# Patient Record
Sex: FEMALE | Race: WHITE | NOT HISPANIC OR LATINO
[De-identification: names, ages, dates, MRNs, and addresses within clinical notes are randomized per-mention and may not be internally consistent; named-entity substitution may affect disease eponyms.]

---

## 2017-04-14 ENCOUNTER — APPOINTMENT (OUTPATIENT)
Dept: INTERNAL MEDICINE | Facility: CLINIC | Age: 82
End: 2017-04-14

## 2017-05-12 ENCOUNTER — APPOINTMENT (OUTPATIENT)
Dept: INTERNAL MEDICINE | Facility: CLINIC | Age: 82
End: 2017-05-12

## 2017-05-12 ENCOUNTER — LABORATORY RESULT (OUTPATIENT)
Age: 82
End: 2017-05-12

## 2017-05-12 VITALS
SYSTOLIC BLOOD PRESSURE: 124 MMHG | HEART RATE: 76 BPM | DIASTOLIC BLOOD PRESSURE: 80 MMHG | BODY MASS INDEX: 20.61 KG/M2 | HEIGHT: 62 IN | WEIGHT: 112 LBS

## 2017-05-12 DIAGNOSIS — Z09 ENCOUNTER FOR FOLLOW-UP EXAMINATION AFTER COMPLETED TREATMENT FOR CONDITIONS OTHER THAN MALIGNANT NEOPLASM: ICD-10-CM

## 2017-05-12 DIAGNOSIS — Z87.898 PERSONAL HISTORY OF OTHER SPECIFIED CONDITIONS: ICD-10-CM

## 2017-05-15 ENCOUNTER — RESULT REVIEW (OUTPATIENT)
Age: 82
End: 2017-05-15

## 2017-05-15 LAB
ALBUMIN SERPL ELPH-MCNC: 4.2 G/DL
ALP BLD-CCNC: 108 U/L
ALT SERPL-CCNC: 17 U/L
ANION GAP SERPL CALC-SCNC: 16 MMOL/L
AST SERPL-CCNC: 32 U/L
BASOPHILS # BLD AUTO: 0.05 K/UL
BASOPHILS NFR BLD AUTO: 0.9 %
BILIRUB SERPL-MCNC: 0.4 MG/DL
BUN SERPL-MCNC: 13 MG/DL
CALCIUM SERPL-MCNC: 9.5 MG/DL
CHLORIDE SERPL-SCNC: 97 MMOL/L
CHOLEST SERPL-MCNC: 214 MG/DL
CHOLEST/HDLC SERPL: 1.4 RATIO
CO2 SERPL-SCNC: 24 MMOL/L
CREAT SERPL-MCNC: 0.69 MG/DL
EOSINOPHIL # BLD AUTO: 0 K/UL
EOSINOPHIL NFR BLD AUTO: 0 %
GLUCOSE SERPL-MCNC: 100 MG/DL
HCT VFR BLD CALC: 41.4 %
HDLC SERPL-MCNC: 156 MG/DL
HGB BLD-MCNC: 13.2 G/DL
LDLC SERPL CALC-MCNC: 47 MG/DL
LYMPHOCYTES # BLD AUTO: 0.87 K/UL
LYMPHOCYTES NFR BLD AUTO: 14.2 %
MAN DIFF?: NORMAL
MCHC RBC-ENTMCNC: 31.9 GM/DL
MCHC RBC-ENTMCNC: 33.8 PG
MCV RBC AUTO: 105.9 FL
MONOCYTES # BLD AUTO: 0.38 K/UL
MONOCYTES NFR BLD AUTO: 6.2 %
NEUTROPHILS # BLD AUTO: 4.64 K/UL
NEUTROPHILS NFR BLD AUTO: 76 %
PLATELET # BLD AUTO: 242 K/UL
POTASSIUM SERPL-SCNC: 4.1 MMOL/L
PROT SERPL-MCNC: 8 G/DL
RBC # BLD: 3.91 M/UL
RBC # FLD: 13 %
SODIUM SERPL-SCNC: 137 MMOL/L
TRIGL SERPL-MCNC: 54 MG/DL
WBC # FLD AUTO: 6.1 K/UL

## 2017-08-10 ENCOUNTER — LABORATORY RESULT (OUTPATIENT)
Age: 82
End: 2017-08-10

## 2017-08-10 ENCOUNTER — APPOINTMENT (OUTPATIENT)
Dept: INTERNAL MEDICINE | Facility: CLINIC | Age: 82
End: 2017-08-10
Payer: MEDICARE

## 2017-08-10 ENCOUNTER — NON-APPOINTMENT (OUTPATIENT)
Age: 82
End: 2017-08-10

## 2017-08-10 VITALS
DIASTOLIC BLOOD PRESSURE: 70 MMHG | SYSTOLIC BLOOD PRESSURE: 120 MMHG | WEIGHT: 114 LBS | BODY MASS INDEX: 20.98 KG/M2 | HEIGHT: 62 IN | RESPIRATION RATE: 14 BRPM | HEART RATE: 76 BPM

## 2017-08-10 DIAGNOSIS — Z87.81 PRESENCE OF OTHER BONE AND TENDON IMPLANTS: ICD-10-CM

## 2017-08-10 DIAGNOSIS — Z96.7 PRESENCE OF OTHER BONE AND TENDON IMPLANTS: ICD-10-CM

## 2017-08-10 DIAGNOSIS — S72.8X1A: ICD-10-CM

## 2017-08-10 PROCEDURE — 36415 COLL VENOUS BLD VENIPUNCTURE: CPT

## 2017-08-10 PROCEDURE — G0439: CPT

## 2017-08-10 PROCEDURE — 93000 ELECTROCARDIOGRAM COMPLETE: CPT

## 2017-08-10 RX ORDER — DILTIAZEM HYDROCHLORIDE 180 MG/1
180 CAPSULE, EXTENDED RELEASE ORAL
Qty: 60 | Refills: 0 | Status: DISCONTINUED | COMMUNITY
Start: 2017-05-04

## 2017-08-10 RX ORDER — AMOXICILLIN 500 MG/1
500 CAPSULE ORAL
Qty: 28 | Refills: 0 | Status: DISCONTINUED | COMMUNITY
Start: 2017-03-09

## 2017-08-11 DIAGNOSIS — D75.89 OTHER SPECIFIED DISEASES OF BLOOD AND BLOOD-FORMING ORGANS: ICD-10-CM

## 2017-08-12 LAB
ALBUMIN SERPL ELPH-MCNC: 4.1 G/DL
ALP BLD-CCNC: 88 U/L
ALT SERPL-CCNC: 16 U/L
ANION GAP SERPL CALC-SCNC: 15 MMOL/L
APPEARANCE: CLEAR
AST SERPL-CCNC: 29 U/L
BACTERIA: NEGATIVE
BASOPHILS # BLD AUTO: 0.16 K/UL
BASOPHILS NFR BLD AUTO: 2.6 %
BILIRUB SERPL-MCNC: 0.3 MG/DL
BILIRUBIN URINE: NEGATIVE
BLOOD URINE: NEGATIVE
BUN SERPL-MCNC: 17 MG/DL
CALCIUM SERPL-MCNC: 9.1 MG/DL
CHLORIDE SERPL-SCNC: 99 MMOL/L
CHOLEST SERPL-MCNC: 241 MG/DL
CHOLEST/HDLC SERPL: 1.7 RATIO
CO2 SERPL-SCNC: 25 MMOL/L
COLOR: YELLOW
CREAT SERPL-MCNC: 0.76 MG/DL
EOSINOPHIL # BLD AUTO: 0.1 K/UL
EOSINOPHIL NFR BLD AUTO: 1.7 %
GLUCOSE QUALITATIVE U: NORMAL MG/DL
GLUCOSE SERPL-MCNC: 90 MG/DL
HCT VFR BLD CALC: 40.3 %
HDLC SERPL-MCNC: 145 MG/DL
HGB BLD-MCNC: 12.7 G/DL
HYALINE CASTS: 0 /LPF
KETONES URINE: NEGATIVE
LDLC SERPL CALC-MCNC: 83 MG/DL
LEUKOCYTE ESTERASE URINE: ABNORMAL
LYMPHOCYTES # BLD AUTO: 0.97 K/UL
LYMPHOCYTES NFR BLD AUTO: 15.7 %
MAN DIFF?: NORMAL
MCHC RBC-ENTMCNC: 31.5 GM/DL
MCHC RBC-ENTMCNC: 33.4 PG
MCV RBC AUTO: 106.1 FL
MICROSCOPIC-UA: NORMAL
MONOCYTES # BLD AUTO: 0.26 K/UL
MONOCYTES NFR BLD AUTO: 4.3 %
NEUTROPHILS # BLD AUTO: 4.6 K/UL
NEUTROPHILS NFR BLD AUTO: 74.8 %
NITRITE URINE: NEGATIVE
PH URINE: 7.5
PLATELET # BLD AUTO: 271 K/UL
POTASSIUM SERPL-SCNC: 4.2 MMOL/L
PROT SERPL-MCNC: 8.1 G/DL
PROTEIN URINE: NEGATIVE MG/DL
RBC # BLD: 3.8 M/UL
RBC # FLD: 14.3 %
RED BLOOD CELLS URINE: 0 /HPF
SODIUM SERPL-SCNC: 139 MMOL/L
SPECIFIC GRAVITY URINE: 1.01
SQUAMOUS EPITHELIAL CELLS: 0 /HPF
TRIGL SERPL-MCNC: 67 MG/DL
UROBILINOGEN URINE: NORMAL MG/DL
WBC # FLD AUTO: 6.15 K/UL
WHITE BLOOD CELLS URINE: 15 /HPF

## 2017-08-14 ENCOUNTER — RESULT REVIEW (OUTPATIENT)
Age: 82
End: 2017-08-14

## 2018-02-07 ENCOUNTER — APPOINTMENT (OUTPATIENT)
Dept: INTERNAL MEDICINE | Facility: CLINIC | Age: 83
End: 2018-02-07
Payer: MEDICARE

## 2018-02-07 VITALS
OXYGEN SATURATION: 99 % | HEART RATE: 87 BPM | WEIGHT: 110 LBS | DIASTOLIC BLOOD PRESSURE: 80 MMHG | HEIGHT: 62 IN | SYSTOLIC BLOOD PRESSURE: 120 MMHG | BODY MASS INDEX: 20.24 KG/M2

## 2018-02-07 PROCEDURE — 36415 COLL VENOUS BLD VENIPUNCTURE: CPT

## 2018-02-07 PROCEDURE — 99214 OFFICE O/P EST MOD 30 MIN: CPT | Mod: 25

## 2018-02-08 ENCOUNTER — RESULT REVIEW (OUTPATIENT)
Age: 83
End: 2018-02-08

## 2018-02-08 LAB
ALBUMIN SERPL ELPH-MCNC: 4.2 G/DL
ALP BLD-CCNC: 79 U/L
ALT SERPL-CCNC: 12 U/L
ANION GAP SERPL CALC-SCNC: 12 MMOL/L
AST SERPL-CCNC: 20 U/L
BASOPHILS # BLD AUTO: 0.03 K/UL
BASOPHILS NFR BLD AUTO: 0.5 %
BILIRUB SERPL-MCNC: 0.4 MG/DL
BUN SERPL-MCNC: 13 MG/DL
CALCIUM SERPL-MCNC: 9.4 MG/DL
CHLORIDE SERPL-SCNC: 103 MMOL/L
CHOLEST SERPL-MCNC: 251 MG/DL
CHOLEST/HDLC SERPL: 1.6 RATIO
CO2 SERPL-SCNC: 26 MMOL/L
CREAT SERPL-MCNC: 0.75 MG/DL
EOSINOPHIL # BLD AUTO: 0.22 K/UL
EOSINOPHIL NFR BLD AUTO: 3.5 %
FOLATE SERPL-MCNC: >20 NG/ML
GLUCOSE SERPL-MCNC: 98 MG/DL
HCT VFR BLD CALC: 43.6 %
HDLC SERPL-MCNC: 154 MG/DL
HGB BLD-MCNC: 14.2 G/DL
IMM GRANULOCYTES NFR BLD AUTO: 0.3 %
LDLC SERPL CALC-MCNC: 85 MG/DL
LYMPHOCYTES # BLD AUTO: 0.84 K/UL
LYMPHOCYTES NFR BLD AUTO: 13.2 %
MAN DIFF?: NORMAL
MCHC RBC-ENTMCNC: 32.6 GM/DL
MCHC RBC-ENTMCNC: 33.3 PG
MCV RBC AUTO: 102.3 FL
MONOCYTES # BLD AUTO: 0.57 K/UL
MONOCYTES NFR BLD AUTO: 9 %
NEUTROPHILS # BLD AUTO: 4.67 K/UL
NEUTROPHILS NFR BLD AUTO: 73.5 %
PLATELET # BLD AUTO: 244 K/UL
POTASSIUM SERPL-SCNC: 4.2 MMOL/L
PROT SERPL-MCNC: 7.9 G/DL
RBC # BLD: 4.26 M/UL
RBC # FLD: 13.3 %
SODIUM SERPL-SCNC: 141 MMOL/L
TRIGL SERPL-MCNC: 62 MG/DL
WBC # FLD AUTO: 6.35 K/UL

## 2018-03-05 ENCOUNTER — RX RENEWAL (OUTPATIENT)
Age: 83
End: 2018-03-05

## 2018-03-19 ENCOUNTER — OUTPATIENT (OUTPATIENT)
Dept: OUTPATIENT SERVICES | Facility: HOSPITAL | Age: 83
LOS: 1 days | End: 2018-03-19

## 2018-03-19 ENCOUNTER — APPOINTMENT (OUTPATIENT)
Dept: INTERNAL MEDICINE | Facility: CLINIC | Age: 83
End: 2018-03-19
Payer: MEDICARE

## 2018-03-19 ENCOUNTER — APPOINTMENT (OUTPATIENT)
Dept: ULTRASOUND IMAGING | Facility: CLINIC | Age: 83
End: 2018-03-19
Payer: MEDICARE

## 2018-03-19 VITALS
BODY MASS INDEX: 20.43 KG/M2 | WEIGHT: 111 LBS | SYSTOLIC BLOOD PRESSURE: 123 MMHG | HEIGHT: 62 IN | DIASTOLIC BLOOD PRESSURE: 80 MMHG | RESPIRATION RATE: 14 BRPM | HEART RATE: 74 BPM

## 2018-03-19 DIAGNOSIS — Z98.89 OTHER SPECIFIED POSTPROCEDURAL STATES: Chronic | ICD-10-CM

## 2018-03-19 DIAGNOSIS — Z96.653 PRESENCE OF ARTIFICIAL KNEE JOINT, BILATERAL: Chronic | ICD-10-CM

## 2018-03-19 PROCEDURE — 99213 OFFICE O/P EST LOW 20 MIN: CPT

## 2018-03-19 PROCEDURE — 93971 EXTREMITY STUDY: CPT | Mod: 26,RT

## 2018-05-31 ENCOUNTER — APPOINTMENT (OUTPATIENT)
Dept: INTERNAL MEDICINE | Facility: CLINIC | Age: 83
End: 2018-05-31
Payer: MEDICARE

## 2018-05-31 VITALS
SYSTOLIC BLOOD PRESSURE: 120 MMHG | OXYGEN SATURATION: 97 % | HEIGHT: 62 IN | TEMPERATURE: 98.6 F | HEART RATE: 66 BPM | WEIGHT: 112 LBS | DIASTOLIC BLOOD PRESSURE: 75 MMHG | BODY MASS INDEX: 20.61 KG/M2

## 2018-05-31 DIAGNOSIS — R20.2 PARESTHESIA OF SKIN: ICD-10-CM

## 2018-05-31 DIAGNOSIS — Z72.820 SLEEP DEPRIVATION: ICD-10-CM

## 2018-05-31 PROCEDURE — 99214 OFFICE O/P EST MOD 30 MIN: CPT | Mod: 25

## 2018-05-31 PROCEDURE — 36415 COLL VENOUS BLD VENIPUNCTURE: CPT

## 2018-05-31 RX ORDER — CEPHALEXIN 500 MG/1
500 CAPSULE ORAL 3 TIMES DAILY
Qty: 21 | Refills: 0 | Status: DISCONTINUED | COMMUNITY
Start: 2018-03-19 | End: 2018-05-31

## 2018-06-01 ENCOUNTER — RESULT REVIEW (OUTPATIENT)
Age: 83
End: 2018-06-01

## 2018-06-01 LAB
ALBUMIN SERPL ELPH-MCNC: 4.2 G/DL
ALP BLD-CCNC: 70 U/L
ALT SERPL-CCNC: 11 U/L
ANION GAP SERPL CALC-SCNC: 20 MMOL/L
AST SERPL-CCNC: 20 U/L
BASOPHILS # BLD AUTO: 0.02 K/UL
BASOPHILS NFR BLD AUTO: 0.3 %
BILIRUB SERPL-MCNC: 0.3 MG/DL
BUN SERPL-MCNC: 15 MG/DL
CALCIUM SERPL-MCNC: 9.5 MG/DL
CHLORIDE SERPL-SCNC: 101 MMOL/L
CO2 SERPL-SCNC: 20 MMOL/L
CREAT SERPL-MCNC: 0.74 MG/DL
EOSINOPHIL # BLD AUTO: 0.16 K/UL
EOSINOPHIL NFR BLD AUTO: 2.3 %
GLUCOSE SERPL-MCNC: 97 MG/DL
HCT VFR BLD CALC: 41 %
HGB BLD-MCNC: 12.9 G/DL
IMM GRANULOCYTES NFR BLD AUTO: 0.3 %
LYMPHOCYTES # BLD AUTO: 1.04 K/UL
LYMPHOCYTES NFR BLD AUTO: 14.7 %
MAN DIFF?: NORMAL
MCHC RBC-ENTMCNC: 31.5 GM/DL
MCHC RBC-ENTMCNC: 32.8 PG
MCV RBC AUTO: 104.3 FL
MONOCYTES # BLD AUTO: 0.38 K/UL
MONOCYTES NFR BLD AUTO: 5.4 %
NEUTROPHILS # BLD AUTO: 5.44 K/UL
NEUTROPHILS NFR BLD AUTO: 77 %
PLATELET # BLD AUTO: 264 K/UL
POTASSIUM SERPL-SCNC: 4.5 MMOL/L
PROT SERPL-MCNC: 7.6 G/DL
RBC # BLD: 3.93 M/UL
RBC # FLD: 13.3 %
SODIUM SERPL-SCNC: 141 MMOL/L
TSH SERPL-ACNC: 1.54 UIU/ML
WBC # FLD AUTO: 7.06 K/UL

## 2018-08-27 ENCOUNTER — RX RENEWAL (OUTPATIENT)
Age: 83
End: 2018-08-27

## 2018-09-17 ENCOUNTER — NON-APPOINTMENT (OUTPATIENT)
Age: 83
End: 2018-09-17

## 2018-09-17 ENCOUNTER — APPOINTMENT (OUTPATIENT)
Dept: INTERNAL MEDICINE | Facility: CLINIC | Age: 83
End: 2018-09-17
Payer: MEDICARE

## 2018-09-17 VITALS
WEIGHT: 108 LBS | DIASTOLIC BLOOD PRESSURE: 68 MMHG | HEART RATE: 86 BPM | RESPIRATION RATE: 14 BRPM | OXYGEN SATURATION: 97 % | HEIGHT: 62 IN | SYSTOLIC BLOOD PRESSURE: 120 MMHG | BODY MASS INDEX: 19.88 KG/M2

## 2018-09-17 DIAGNOSIS — I87.2 VENOUS INSUFFICIENCY (CHRONIC) (PERIPHERAL): ICD-10-CM

## 2018-09-17 DIAGNOSIS — L30.9 DERMATITIS, UNSPECIFIED: ICD-10-CM

## 2018-09-17 DIAGNOSIS — I27.20 PULMONARY HYPERTENSION, UNSPECIFIED: ICD-10-CM

## 2018-09-17 DIAGNOSIS — M19.90 UNSPECIFIED OSTEOARTHRITIS, UNSPECIFIED SITE: ICD-10-CM

## 2018-09-17 LAB — CYTOLOGY CVX/VAG DOC THIN PREP: NORMAL

## 2018-09-17 PROCEDURE — 36415 COLL VENOUS BLD VENIPUNCTURE: CPT

## 2018-09-17 PROCEDURE — G0439: CPT

## 2018-09-17 PROCEDURE — 93000 ELECTROCARDIOGRAM COMPLETE: CPT

## 2018-09-17 NOTE — HISTORY OF PRESENT ILLNESS
[FreeTextEntry1] : 85-year-old female with history of hypertension for which she takes Diltiazem, hyperlipidemia for which she has declined medications and osteoarthritis with DJD and DDD presents for her yearly physical.\par \par Also SVT which is controlled with diltiazem.\par \par Patient also with pulmonary hypertension without clinical signs.\par \par History also includes a history of pulmonary embolism in 2009 without etiology and hypercoagulable workup negative there has been no recurrence and resultant mild pulmonary hypertension.\par \par Also patient has a remote history of TB for which she was treated.\par \par Patient is generally feeling well with one significant issue which is persistent pruritus for which she has seen  dermatology on a number of different occasions and even wanted to [place] with no etiology of her pruritus has been determined and nothing she's been given has helped. Patient scratches frequently\par \par Otherwise without complaints including no chest pain, palpitations, shortness of breath or edema.\par \par She fell July 2016 with resultant right femur fracture which needed operative repair. The patient still suffers with this with pain would decrease ability for leg movement and  limp.\par \par More recently, the patient fell about 2 weeks ago, sustaining a fracture of her left wrist with no surgical intervention, but following with orthopedic with splinting.

## 2018-09-17 NOTE — PHYSICAL EXAM
[General Appearance - Alert] : alert [General Appearance - In No Acute Distress] : in no acute distress [Sclera] : the sclera and conjunctiva were normal [PERRL With Normal Accommodation] : pupils were equal in size, round, and reactive to light [Extraocular Movements] : extraocular movements were intact [Outer Ear] : the ears and nose were normal in appearance [Oropharynx] : the oropharynx was normal [Neck Appearance] : the appearance of the neck was normal [Neck Cervical Mass (___cm)] : no neck mass was observed [Jugular Venous Distention Increased] : there was no jugular-venous distention [Thyroid Diffuse Enlargement] : the thyroid was not enlarged [Thyroid Nodule] : there were no palpable thyroid nodules [Auscultation Breath Sounds / Voice Sounds] : lungs were clear to auscultation bilaterally [Heart Rate And Rhythm] : heart rate was normal and rhythm regular [Heart Sounds] : normal S1 and S2 [Heart Sounds Gallop] : no gallops [Murmurs] : no murmurs [Heart Sounds Pericardial Friction Rub] : no pericardial rub [Arterial Pulses Carotid] : carotid pulses were normal with no bruits [Abdominal Aorta] : the abdominal aorta was normal [Arterial Pulses Femoral] : femoral pulses were normal without bruits [Full Pulse] : the pedal pulses are present [Edema] : there was no peripheral edema [Breast Appearance] : normal in appearance [Breast Palpation Mass] : no palpable masses [Breast Abnormal Lactation (Galactorrhea)] : no nipple discharge [Bowel Sounds] : normal bowel sounds [Abdomen Soft] : soft [Abdomen Tenderness] : non-tender [Abdomen Mass (___ Cm)] : no abdominal mass palpated [Cervical Lymph Nodes Enlarged Posterior Bilaterally] : posterior cervical [Cervical Lymph Nodes Enlarged Anterior Bilaterally] : anterior cervical [Supraclavicular Lymph Nodes Enlarged Bilaterally] : supraclavicular [Axillary Lymph Nodes Enlarged Bilaterally] : axillary [Femoral Lymph Nodes Enlarged Bilaterally] : femoral [Inguinal Lymph Nodes Enlarged Bilaterally] : inguinal [No Spinal Tenderness] : no spinal tenderness [Abnormal Walk] : normal gait [Nail Clubbing] : no clubbing  or cyanosis of the fingernails [Musculoskeletal - Swelling] : no joint swelling seen [Motor Tone] : muscle strength and tone were normal [Skin Color & Pigmentation] : normal skin color and pigmentation [Skin Turgor] : normal skin turgor [] : no rash [Cranial Nerves] : cranial nerves 2-12 were intact [No Focal Deficits] : no focal deficits [Oriented To Time, Place, And Person] : oriented to person, place, and time [Impaired Insight] : insight and judgment were intact [Affect] : the affect was normal [FreeTextEntry1] : Many excoriations

## 2018-09-17 NOTE — ASSESSMENT
[FreeTextEntry1] : 85-year-old female currently medically stable without any active medical issues but continued chronic pruritus with no etiology and significant morbidity secondary to chronic need to scratch.\par \par Hypertension and SVT controlled on present medications\par \par Patient with no clinical signs of pulmonary hypertension\par \par Patient is to followup with dermatology with hopes of some treatment\par \par Patient is to continue present medications.\par \par Colonoscopy in 2007 and patient declined followups despite family history of colon cancer.\par  Offered [place] guard stool test which she also declines.\par \par Mammography 2009 and declines followup\par Bone density-has never had and declines\par \par Has declined and continues to decline all vaccines.\par \par Cardiology followup recommended and referral given\par \par Followup in 6 months\par

## 2018-09-17 NOTE — HEALTH RISK ASSESSMENT
[Good] : ~his/her~ current health as good [Very Good] : ~his/her~  mood as very good [Any fall with injury in past year] : Patient reported fall with injury in the past year [0] : 2) Feeling down, depressed, or hopeless: Not at all (0) [None] : None [With Significant Other] : lives with significant other [# of Members in Household ___] :  household currently consist of [unfilled] member(s) [High School] : high school [] :  [# Of Children ___] : has [unfilled] children [Sexually Active] : sexually active [Feels Safe at Home] : Feels safe at home [Fully functional (bathing, dressing, toileting, transferring, walking, feeding)] : Fully functional (bathing, dressing, toileting, transferring, walking, feeding) [Fully functional (using the telephone, shopping, preparing meals, housekeeping, doing laundry, using] : Fully functional and needs no help or supervision to perform IADLs (using the telephone, shopping, preparing meals, housekeeping, doing laundry, using transportation, managing medications and managing finances) [Smoke Detector] : smoke detector [Carbon Monoxide Detector] : carbon monoxide detector [Seat Belt] :  uses seat belt [Sunscreen] : uses sunscreen [Discussed at today's visit] : Advance Directives Discussed at today's visit [Designated Healthcare Proxy] : Designated healthcare proxy [Name: ___] : Health Care Proxy's Name: [unfilled]  [] : No [de-identified] : occ [[id number]] : 0 [Change in mental status noted] : No change in mental status noted [Reports changes in hearing] : Reports no changes in hearing [Reports changes in vision] : Reports no changes in vision [Reports changes in dental health] : Reports no changes in dental health [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

## 2018-09-18 ENCOUNTER — RESULT REVIEW (OUTPATIENT)
Age: 83
End: 2018-09-18

## 2018-09-18 LAB
ALBUMIN SERPL ELPH-MCNC: 4.3 G/DL
ALP BLD-CCNC: 67 U/L
ALT SERPL-CCNC: 10 U/L
ANION GAP SERPL CALC-SCNC: 16 MMOL/L
AST SERPL-CCNC: 20 U/L
BASOPHILS # BLD AUTO: 0.01 K/UL
BASOPHILS NFR BLD AUTO: 0.2 %
BILIRUB SERPL-MCNC: 0.4 MG/DL
BUN SERPL-MCNC: 18 MG/DL
CALCIUM SERPL-MCNC: 10 MG/DL
CHLORIDE SERPL-SCNC: 100 MMOL/L
CHOLEST SERPL-MCNC: 225 MG/DL
CHOLEST/HDLC SERPL: 1.8 RATIO
CO2 SERPL-SCNC: 24 MMOL/L
CREAT SERPL-MCNC: 0.71 MG/DL
EOSINOPHIL # BLD AUTO: 0.12 K/UL
EOSINOPHIL NFR BLD AUTO: 1.8 %
FOLATE SERPL-MCNC: >20 NG/ML
GLUCOSE SERPL-MCNC: 96 MG/DL
HCT VFR BLD CALC: 41.3 %
HDLC SERPL-MCNC: 127 MG/DL
HGB BLD-MCNC: 13.2 G/DL
IMM GRANULOCYTES NFR BLD AUTO: 0 %
LDLC SERPL CALC-MCNC: 86 MG/DL
LYMPHOCYTES # BLD AUTO: 1 K/UL
LYMPHOCYTES NFR BLD AUTO: 15.1 %
MAN DIFF?: NORMAL
MCHC RBC-ENTMCNC: 32 GM/DL
MCHC RBC-ENTMCNC: 33.2 PG
MCV RBC AUTO: 104 FL
MONOCYTES # BLD AUTO: 0.35 K/UL
MONOCYTES NFR BLD AUTO: 5.3 %
NEUTROPHILS # BLD AUTO: 5.14 K/UL
NEUTROPHILS NFR BLD AUTO: 77.6 %
PLATELET # BLD AUTO: 265 K/UL
POTASSIUM SERPL-SCNC: 5.2 MMOL/L
PROT SERPL-MCNC: 7.7 G/DL
RBC # BLD: 3.97 M/UL
RBC # FLD: 13.7 %
SODIUM SERPL-SCNC: 140 MMOL/L
TRIGL SERPL-MCNC: 60 MG/DL
WBC # FLD AUTO: 6.62 K/UL

## 2019-02-11 ENCOUNTER — APPOINTMENT (OUTPATIENT)
Dept: DERMATOLOGY | Facility: CLINIC | Age: 84
End: 2019-02-11
Payer: MEDICARE

## 2019-02-11 PROCEDURE — 99213 OFFICE O/P EST LOW 20 MIN: CPT | Mod: 25

## 2019-02-11 PROCEDURE — 96910 PHOTCHMTX TAR&UVB/PTRLTM&UVB: CPT

## 2019-02-15 ENCOUNTER — APPOINTMENT (OUTPATIENT)
Dept: DERMATOLOGY | Facility: CLINIC | Age: 84
End: 2019-02-15
Payer: MEDICARE

## 2019-02-15 PROCEDURE — 9999D: CPT

## 2019-02-15 PROCEDURE — 96910 PHOTCHMTX TAR&UVB/PTRLTM&UVB: CPT

## 2019-02-20 ENCOUNTER — APPOINTMENT (OUTPATIENT)
Dept: DERMATOLOGY | Facility: CLINIC | Age: 84
End: 2019-02-20
Payer: MEDICARE

## 2019-02-20 PROCEDURE — 96910 PHOTCHMTX TAR&UVB/PTRLTM&UVB: CPT

## 2019-03-12 ENCOUNTER — FORM ENCOUNTER (OUTPATIENT)
Age: 84
End: 2019-03-12

## 2019-03-13 ENCOUNTER — APPOINTMENT (OUTPATIENT)
Dept: CT IMAGING | Facility: CLINIC | Age: 84
End: 2019-03-13
Payer: MEDICARE

## 2019-03-13 ENCOUNTER — OUTPATIENT (OUTPATIENT)
Dept: OUTPATIENT SERVICES | Facility: HOSPITAL | Age: 84
LOS: 1 days | End: 2019-03-13
Payer: MEDICARE

## 2019-03-13 ENCOUNTER — RESULT CHARGE (OUTPATIENT)
Age: 84
End: 2019-03-13

## 2019-03-13 ENCOUNTER — NON-APPOINTMENT (OUTPATIENT)
Age: 84
End: 2019-03-13

## 2019-03-13 ENCOUNTER — LABORATORY RESULT (OUTPATIENT)
Age: 84
End: 2019-03-13

## 2019-03-13 ENCOUNTER — APPOINTMENT (OUTPATIENT)
Dept: INTERNAL MEDICINE | Facility: CLINIC | Age: 84
End: 2019-03-13
Payer: MEDICARE

## 2019-03-13 VITALS
DIASTOLIC BLOOD PRESSURE: 80 MMHG | HEIGHT: 62 IN | TEMPERATURE: 97.5 F | OXYGEN SATURATION: 99 % | SYSTOLIC BLOOD PRESSURE: 120 MMHG | WEIGHT: 104 LBS | BODY MASS INDEX: 19.14 KG/M2 | HEART RATE: 100 BPM | RESPIRATION RATE: 13 BRPM

## 2019-03-13 DIAGNOSIS — R10.31 RIGHT LOWER QUADRANT PAIN: ICD-10-CM

## 2019-03-13 DIAGNOSIS — Z98.89 OTHER SPECIFIED POSTPROCEDURAL STATES: Chronic | ICD-10-CM

## 2019-03-13 DIAGNOSIS — R19.7 DIARRHEA, UNSPECIFIED: ICD-10-CM

## 2019-03-13 DIAGNOSIS — Z96.653 PRESENCE OF ARTIFICIAL KNEE JOINT, BILATERAL: Chronic | ICD-10-CM

## 2019-03-13 DIAGNOSIS — R63.0 ANOREXIA: ICD-10-CM

## 2019-03-13 LAB
FLUAV SPEC QL CULT: NORMAL
FLUBV AG SPEC QL IA: NORMAL

## 2019-03-13 PROCEDURE — 71046 X-RAY EXAM CHEST 2 VIEWS: CPT

## 2019-03-13 PROCEDURE — 93000 ELECTROCARDIOGRAM COMPLETE: CPT

## 2019-03-13 PROCEDURE — 87804 INFLUENZA ASSAY W/OPTIC: CPT | Mod: QW

## 2019-03-13 PROCEDURE — 71046 X-RAY EXAM CHEST 2 VIEWS: CPT | Mod: 26

## 2019-03-13 PROCEDURE — 74177 CT ABD & PELVIS W/CONTRAST: CPT | Mod: 26

## 2019-03-13 PROCEDURE — 99214 OFFICE O/P EST MOD 30 MIN: CPT | Mod: 25

## 2019-03-13 PROCEDURE — 82565 ASSAY OF CREATININE: CPT

## 2019-03-13 PROCEDURE — 74177 CT ABD & PELVIS W/CONTRAST: CPT

## 2019-03-13 PROCEDURE — 36415 COLL VENOUS BLD VENIPUNCTURE: CPT

## 2019-03-14 LAB
ALBUMIN SERPL ELPH-MCNC: 3.8 G/DL
ALP BLD-CCNC: 64 U/L
ALT SERPL-CCNC: 9 U/L
ANION GAP SERPL CALC-SCNC: 14 MMOL/L
AST SERPL-CCNC: 16 U/L
BASOPHILS # BLD AUTO: 0.04 K/UL
BASOPHILS NFR BLD AUTO: 0.6 %
BILIRUB SERPL-MCNC: 0.3 MG/DL
BUN SERPL-MCNC: 14 MG/DL
CALCIUM SERPL-MCNC: 9.4 MG/DL
CHLORIDE SERPL-SCNC: 101 MMOL/L
CO2 SERPL-SCNC: 25 MMOL/L
CREAT SERPL-MCNC: 0.76 MG/DL
EOSINOPHIL # BLD AUTO: 0.09 K/UL
EOSINOPHIL NFR BLD AUTO: 1.2 %
GLUCOSE SERPL-MCNC: 104 MG/DL
HCT VFR BLD CALC: 42.9 %
HGB BLD-MCNC: 13 G/DL
IMM GRANULOCYTES NFR BLD AUTO: 0.3 %
LYMPHOCYTES # BLD AUTO: 0.98 K/UL
LYMPHOCYTES NFR BLD AUTO: 13.5 %
MAN DIFF?: NORMAL
MCHC RBC-ENTMCNC: 30.3 GM/DL
MCHC RBC-ENTMCNC: 32.3 PG
MCV RBC AUTO: 106.7 FL
MONOCYTES # BLD AUTO: 0.46 K/UL
MONOCYTES NFR BLD AUTO: 6.3 %
NEUTROPHILS # BLD AUTO: 5.66 K/UL
NEUTROPHILS NFR BLD AUTO: 78.1 %
PLATELET # BLD AUTO: 267 K/UL
POTASSIUM SERPL-SCNC: 4.2 MMOL/L
PROT SERPL-MCNC: 7.3 G/DL
RBC # BLD: 4.02 M/UL
RBC # FLD: 12.6 %
SODIUM SERPL-SCNC: 140 MMOL/L
TSH SERPL-ACNC: 2.74 UIU/ML
WBC # FLD AUTO: 7.25 K/UL

## 2019-03-15 ENCOUNTER — RESULT REVIEW (OUTPATIENT)
Age: 84
End: 2019-03-15

## 2019-03-15 NOTE — HISTORY OF PRESENT ILLNESS
[Spouse] : spouse [FreeTextEntry8] : Pt presents complaining of not feeling all for at least 2 weeks. Patient is complaining of loose stool/diarrhea with decreased appetite "lost 9 lbs in 2weeks" and associated fatigue/cough with mild right-sided chest pain. Patient states the right-sided chest pain it is only present when she coughs but otherwise does not feel discomfort. Patient denies fever/abdominal pain/vomiting/shortness of breath/dyspnea/urinary symptoms/travel or sick contacts. Patient has not been on antibiotics recently

## 2019-03-15 NOTE — PHYSICAL EXAM
[No Acute Distress] : no acute distress [Normal Outer Ear/Nose] : the outer ears and nose were normal in appearance [Normal Oropharynx] : the oropharynx was normal [Normal TMs] : both tympanic membranes were normal [Normal Nasal Mucosa] : the nasal mucosa was normal [Supple] : supple [No Respiratory Distress] : no respiratory distress  [Clear to Auscultation] : lungs were clear to auscultation bilaterally [Normal Rate] : normal rate  [Regular Rhythm] : with a regular rhythm [No Edema] : there was no peripheral edema [Soft] : abdomen soft [Non-distended] : non-distended [Normal Bowel Sounds] : normal bowel sounds [No CVA Tenderness] : no CVA  tenderness [No Focal Deficits] : no focal deficits [Normal Affect] : the affect was normal [Normal Mood] : the mood was normal [de-identified] : +Reproducible right-sided chest pain, no rash [de-identified] : +tender RLQ/right middle abd, no guarding/rebound

## 2019-03-15 NOTE — ASSESSMENT
[FreeTextEntry1] : Labs/urine sent out. CT of the abdomen and pelvis and chest x-ray ordered. Stool tests ordered. Patient will report any persistent/worsening and return to office as scheduled for regular followup\par \par Dr. Ortega was present in office building while I examined this pt

## 2019-03-15 NOTE — ADDENDUM
[FreeTextEntry1] : Labs are normal\par Patient was unable to do urine specimen\par \par CT of the abdomen and pelvis shows\par -Possible mild colitis involving the ascending colon\par Plan\par -Stool tests and GI evaluation\par \par Chest x-ray shows stable right apical scar, clear lungs otherwise\par

## 2019-03-20 ENCOUNTER — APPOINTMENT (OUTPATIENT)
Dept: GASTROENTEROLOGY | Facility: CLINIC | Age: 84
End: 2019-03-20
Payer: MEDICARE

## 2019-03-20 VITALS
OXYGEN SATURATION: 98 % | BODY MASS INDEX: 19.14 KG/M2 | SYSTOLIC BLOOD PRESSURE: 115 MMHG | RESPIRATION RATE: 15 BRPM | WEIGHT: 104 LBS | DIASTOLIC BLOOD PRESSURE: 80 MMHG | HEART RATE: 83 BPM | HEIGHT: 62 IN

## 2019-03-20 DIAGNOSIS — R93.5 ABNORMAL FINDINGS ON DIAGNOSTIC IMAGING OF OTHER ABDOMINAL REGIONS, INCLUDING RETROPERITONEUM: ICD-10-CM

## 2019-03-20 DIAGNOSIS — R19.7 DIARRHEA, UNSPECIFIED: ICD-10-CM

## 2019-03-20 PROCEDURE — 99203 OFFICE O/P NEW LOW 30 MIN: CPT

## 2019-03-20 NOTE — HISTORY OF PRESENT ILLNESS
[de-identified] : This is a 86-year-old female with history of pulmonary hypertension, HTN, SVT who presents with  intermittent diarrhea and constipation. She has a long history of irritable bowel syndrome with diarrhea and constipation.  Usually she has several days of constipation that are followed by several days of diarrhea. Three weeks ago she was sick with the flu or some sort of viral illness. She then had 2 weeks of severe diarrhea and saw Dr. Saenz and was sent for stool studies and a CT a/p.  The CT a/p showed mild colitis in the ascending colon.She was never able to do the stool studies because the diarrhea stopped. She no longer has diarrhea and has been constipated for 3 days and feels distended. She is starting to gain her weight back; she lost 7 lbs while she was ill with the flu 3 weeks ago. She had a colonoscopy more than 10 years ago which she says was normal.  She is here with her .

## 2019-03-20 NOTE — CONSULT LETTER
[Dear  ___] : Dear  [unfilled], [Consult Letter:] : I had the pleasure of evaluating your patient, [unfilled]. [Please see my note below.] : Please see my note below. [Sincerely,] : Sincerely, [FreeTextEntry3] : Bakari Lawson MD

## 2019-03-20 NOTE — ASSESSMENT
[FreeTextEntry1] : 85 y/o woman with a history of Irritable bowel syndrome intermittent diarrhea and constipation. Here CT a/p showed mild colitis possibly secondary to a viral gastroenteritis since her diarrhea has resolved. She currently has constipation so I  will have her start Miralax today. We discussed a colonoscopy but she would prefer to hold off at this time and see how she does on the miralax. She will call me in a week to discuss how she is doing.

## 2019-03-20 NOTE — PHYSICAL EXAM
[General Appearance - Alert] : alert [General Appearance - In No Acute Distress] : in no acute distress [Sclera] : the sclera and conjunctiva were normal [PERRL With Normal Accommodation] : pupils were equal in size, round, and reactive to light [Extraocular Movements] : extraocular movements were intact [Outer Ear] : the ears and nose were normal in appearance [Oropharynx] : the oropharynx was normal [Neck Appearance] : the appearance of the neck was normal [Neck Cervical Mass (___cm)] : no neck mass was observed [Jugular Venous Distention Increased] : there was no jugular-venous distention [Thyroid Diffuse Enlargement] : the thyroid was not enlarged [Thyroid Nodule] : there were no palpable thyroid nodules [Auscultation Breath Sounds / Voice Sounds] : lungs were clear to auscultation bilaterally [Heart Rate And Rhythm] : heart rate was normal and rhythm regular [Heart Sounds] : normal S1 and S2 [Heart Sounds Gallop] : no gallops [Murmurs] : no murmurs [Heart Sounds Pericardial Friction Rub] : no pericardial rub [Bowel Sounds] : normal bowel sounds [Abdomen Soft] : soft [] : no hepato-splenomegaly [Abdomen Tenderness] : non-tender [Abdomen Mass (___ Cm)] : no abdominal mass palpated [No CVA Tenderness] : no ~M costovertebral angle tenderness [FreeTextEntry1] : edema and stasis changes b/l LE; ecxema [Oriented To Time, Place, And Person] : oriented to person, place, and time [Impaired Insight] : insight and judgment were intact [Affect] : the affect was normal [Mood] : the mood was normal

## 2019-03-25 ENCOUNTER — EMERGENCY (EMERGENCY)
Facility: HOSPITAL | Age: 84
LOS: 1 days | Discharge: HOME | End: 2019-03-25
Payer: MEDICARE

## 2019-03-25 DIAGNOSIS — Z98.89 OTHER SPECIFIED POSTPROCEDURAL STATES: Chronic | ICD-10-CM

## 2019-03-25 DIAGNOSIS — Z96.653 PRESENCE OF ARTIFICIAL KNEE JOINT, BILATERAL: Chronic | ICD-10-CM

## 2019-03-26 ENCOUNTER — TRANSCRIPTION ENCOUNTER (OUTPATIENT)
Age: 84
End: 2019-03-26

## 2019-03-26 PROCEDURE — 36415 COLL VENOUS BLD VENIPUNCTURE: CPT

## 2019-03-26 PROCEDURE — 86140 C-REACTIVE PROTEIN: CPT

## 2019-03-26 PROCEDURE — 99285 EMERGENCY DEPT VISIT HI MDM: CPT

## 2019-03-26 PROCEDURE — 71046 X-RAY EXAM CHEST 2 VIEWS: CPT

## 2019-03-26 PROCEDURE — 85027 COMPLETE CBC AUTOMATED: CPT

## 2019-03-26 PROCEDURE — 85610 PROTHROMBIN TIME: CPT

## 2019-03-26 PROCEDURE — 85730 THROMBOPLASTIN TIME PARTIAL: CPT

## 2019-03-26 PROCEDURE — 83605 ASSAY OF LACTIC ACID: CPT

## 2019-03-26 PROCEDURE — 80053 COMPREHEN METABOLIC PANEL: CPT

## 2019-03-26 PROCEDURE — 93971 EXTREMITY STUDY: CPT

## 2019-03-26 PROCEDURE — 87040 BLOOD CULTURE FOR BACTERIA: CPT

## 2019-03-26 PROCEDURE — 93005 ELECTROCARDIOGRAM TRACING: CPT

## 2019-03-27 PROBLEM — I48.91 UNSPECIFIED ATRIAL FIBRILLATION: Chronic | Status: ACTIVE | Noted: 2019-03-25

## 2019-04-09 ENCOUNTER — APPOINTMENT (OUTPATIENT)
Dept: INTERNAL MEDICINE | Facility: CLINIC | Age: 84
End: 2019-04-09
Payer: MEDICARE

## 2019-04-09 VITALS
HEART RATE: 84 BPM | RESPIRATION RATE: 16 BRPM | OXYGEN SATURATION: 96 % | WEIGHT: 110 LBS | SYSTOLIC BLOOD PRESSURE: 125 MMHG | DIASTOLIC BLOOD PRESSURE: 80 MMHG | BODY MASS INDEX: 20.24 KG/M2 | HEIGHT: 62 IN

## 2019-04-09 DIAGNOSIS — R63.0 ANOREXIA: ICD-10-CM

## 2019-04-09 DIAGNOSIS — L03.115 CELLULITIS OF RIGHT LOWER LIMB: ICD-10-CM

## 2019-04-09 DIAGNOSIS — Z79.01 LONG TERM (CURRENT) USE OF ANTICOAGULANTS: ICD-10-CM

## 2019-04-09 PROCEDURE — 36415 COLL VENOUS BLD VENIPUNCTURE: CPT

## 2019-04-09 PROCEDURE — 99495 TRANSJ CARE MGMT MOD F2F 14D: CPT | Mod: 25

## 2019-04-09 NOTE — HISTORY OF PRESENT ILLNESS
[FreeTextEntry2] : The patient presents after being admitted to [facility] where she was sent to the ER on March 25, 2019 by cardiology. The patient was seen that day by cardiology for routine evaluation and found to have edema of her left leg for a one-week period\par Outpatient duplex was done showing a DVT of the left leg therefore she was sent to the ER.\par \par Patient was admitted and repeat duplex was done showing left-sided below the knee acute nonocclusive DVT without extension into the popliteal vein.\par \par Patient was felt stable to be discharged the next day on Elaquis 2.5 mg twice a day.\par \par Since discharge the patient has felt relatively well with continued swelling of her left leg without pain.\par Direct questioning shows no provoking event such as prolonged car ride or airplane trip, trauma or immobility.\par \par The patient did have one day of rectal bleeding which she thinks started after a difficult bowel movement and bleeding has stopped without recurrence.

## 2019-04-09 NOTE — PHYSICAL EXAM
[No Acute Distress] : no acute distress [No Respiratory Distress] : no respiratory distress  [Clear to Auscultation] : lungs were clear to auscultation bilaterally [Regular Rhythm] : with a regular rhythm [No Focal Deficits] : no focal deficits [Normal Affect] : the affect was normal [de-identified] : +2 pitting edema left leg below the knee to the foot without calf tenderness or erythema

## 2019-04-09 NOTE — ASSESSMENT
[FreeTextEntry1] : Acute DVT left leg below the knee without extension into the popliteal vein which is unprovoked therefore unclear why this has occurred.\par \par Patient will continue Eliquis 2.5 mg twice a day for at least 3 months.\par \par Hematology consultation ordered to assess patient for possible etiology.\par \par CBC will be checked.\par \par Patient told to report any further rectal bleeding.\par \par Followup in 3 weeks to reassess and possible followup duplex.

## 2019-04-10 ENCOUNTER — RESULT REVIEW (OUTPATIENT)
Age: 84
End: 2019-04-10

## 2019-04-10 LAB
BASOPHILS # BLD AUTO: 0.03 K/UL
BASOPHILS NFR BLD AUTO: 0.5 %
EOSINOPHIL # BLD AUTO: 0.1 K/UL
EOSINOPHIL NFR BLD AUTO: 1.7 %
HCT VFR BLD CALC: 36.3 %
HGB BLD-MCNC: 11.3 G/DL
IMM GRANULOCYTES NFR BLD AUTO: 0.2 %
LYMPHOCYTES # BLD AUTO: 0.97 K/UL
LYMPHOCYTES NFR BLD AUTO: 16.3 %
MAN DIFF?: NORMAL
MCHC RBC-ENTMCNC: 31.1 GM/DL
MCHC RBC-ENTMCNC: 32.7 PG
MCV RBC AUTO: 104.9 FL
MONOCYTES # BLD AUTO: 0.48 K/UL
MONOCYTES NFR BLD AUTO: 8.1 %
NEUTROPHILS # BLD AUTO: 4.36 K/UL
NEUTROPHILS NFR BLD AUTO: 73.2 %
PLATELET # BLD AUTO: 249 K/UL
RBC # BLD: 3.46 M/UL
RBC # FLD: 13.9 %
WBC # FLD AUTO: 5.95 K/UL

## 2019-04-15 ENCOUNTER — APPOINTMENT (OUTPATIENT)
Dept: INTERNAL MEDICINE | Facility: CLINIC | Age: 84
End: 2019-04-15
Payer: MEDICARE

## 2019-04-15 VITALS
RESPIRATION RATE: 14 BRPM | HEART RATE: 88 BPM | DIASTOLIC BLOOD PRESSURE: 70 MMHG | SYSTOLIC BLOOD PRESSURE: 120 MMHG | HEIGHT: 62 IN | BODY MASS INDEX: 20.43 KG/M2 | WEIGHT: 111 LBS

## 2019-04-15 PROCEDURE — 36415 COLL VENOUS BLD VENIPUNCTURE: CPT

## 2019-04-15 PROCEDURE — 99213 OFFICE O/P EST LOW 20 MIN: CPT | Mod: 25

## 2019-04-15 RX ORDER — ASPIRIN 81 MG/1
81 TABLET ORAL
Refills: 0 | Status: DISCONTINUED | COMMUNITY
Start: 2018-05-31 | End: 2019-04-15

## 2019-04-15 NOTE — PHYSICAL EXAM
[No Acute Distress] : no acute distress [No Respiratory Distress] : no respiratory distress  [Clear to Auscultation] : lungs were clear to auscultation bilaterally [Normal Rate] : normal rate  [Regular Rhythm] : with a regular rhythm [No Stool to Guaiac] : no stool to guaiac [No Mass] : no mass [No Focal Deficits] : no focal deficits [Normal Affect] : the affect was normal [de-identified] : trace bilat edema [FreeTextEntry1] : no mass

## 2019-04-15 NOTE — HISTORY OF PRESENT ILLNESS
[FreeTextEntry1] : Followup DVT on Eliquis [de-identified] : OLD NOTE:\par Brief Hospital Course: The patient presents after being admitted to [facility] where she was sent to the ER on March 25, 2019 by cardiology. The patient was seen that day by cardiology for routine evaluation and found to have edema of her left leg for a one-week period\par Outpatient duplex was done showing a DVT of the left leg therefore she was sent to the ER.\par \par Patient was admitted and repeat duplex was done showing left-sided below the knee acute nonocclusive DVT without extension into the popliteal vein.\par \par Patient was felt stable to be discharged the next day on Elaquis 2.5 mg twice a day.\par \par Since discharge the patient has felt relatively well with continued swelling of her left leg without pain.\par Direct questioning shows no provoking event such as prolonged car ride or airplane trip, trauma or immobility.\par \par The patient did have one day of rectal bleeding which she thinks started after a difficult bowel movement and bleeding has stopped without recurrence. \par \par NEW NOTE:\par Since patient visit last week she has had no further rectal bleeding.\par She continues on Eliquis about side effects.\par Hematology evaluation scheduled for next month.

## 2019-04-15 NOTE — ASSESSMENT
[FreeTextEntry1] : Status post acute DVT of the left leg on March 25 with patient currently stable on Eliquis.\par Last week she had 2 episodes of right red blood per rectum which has stopped with negative rectal exam.\par Blood work last week showed mild anemia which will be rechecked today.\par Followup in 3 weeks\par Hematology evaluation as scheduled

## 2019-04-16 LAB
BASOPHILS # BLD AUTO: 0.05 K/UL
BASOPHILS NFR BLD AUTO: 0.8 %
EOSINOPHIL # BLD AUTO: 0.11 K/UL
EOSINOPHIL NFR BLD AUTO: 1.8 %
FERRITIN SERPL-MCNC: 24 NG/ML
FOLATE SERPL-MCNC: >20 NG/ML
HCT VFR BLD CALC: 35.3 %
HGB BLD-MCNC: 11.3 G/DL
IMM GRANULOCYTES NFR BLD AUTO: 0.3 %
IRON SATN MFR SERPL: 15 %
IRON SERPL-MCNC: 52 UG/DL
LYMPHOCYTES # BLD AUTO: 1.06 K/UL
LYMPHOCYTES NFR BLD AUTO: 17.4 %
MAN DIFF?: NORMAL
MCHC RBC-ENTMCNC: 32 GM/DL
MCHC RBC-ENTMCNC: 32.8 PG
MCV RBC AUTO: 102.3 FL
MONOCYTES # BLD AUTO: 0.54 K/UL
MONOCYTES NFR BLD AUTO: 8.9 %
NEUTROPHILS # BLD AUTO: 4.3 K/UL
NEUTROPHILS NFR BLD AUTO: 70.8 %
PLATELET # BLD AUTO: 272 K/UL
RBC # BLD: 3.45 M/UL
RBC # FLD: 13.6 %
TIBC SERPL-MCNC: 356 UG/DL
UIBC SERPL-MCNC: 304 UG/DL
VIT B12 SERPL-MCNC: 751 PG/ML
WBC # FLD AUTO: 6.08 K/UL

## 2019-04-24 ENCOUNTER — RX RENEWAL (OUTPATIENT)
Age: 84
End: 2019-04-24

## 2019-04-24 ENCOUNTER — RESULT REVIEW (OUTPATIENT)
Age: 84
End: 2019-04-24

## 2019-05-07 ENCOUNTER — APPOINTMENT (OUTPATIENT)
Dept: INTERNAL MEDICINE | Facility: CLINIC | Age: 84
End: 2019-05-07
Payer: MEDICARE

## 2019-05-07 VITALS
DIASTOLIC BLOOD PRESSURE: 70 MMHG | HEART RATE: 88 BPM | RESPIRATION RATE: 16 BRPM | SYSTOLIC BLOOD PRESSURE: 117 MMHG | WEIGHT: 109 LBS | HEIGHT: 62 IN | BODY MASS INDEX: 20.06 KG/M2

## 2019-05-07 DIAGNOSIS — I47.1 SUPRAVENTRICULAR TACHYCARDIA: ICD-10-CM

## 2019-05-07 PROCEDURE — 99213 OFFICE O/P EST LOW 20 MIN: CPT | Mod: 25

## 2019-05-07 PROCEDURE — 36415 COLL VENOUS BLD VENIPUNCTURE: CPT

## 2019-05-07 NOTE — HISTORY OF PRESENT ILLNESS
[FreeTextEntry1] : Followup DVT on Eliquis [de-identified] : OLD NOTE:\par Brief Hospital Course: The patient presents after being admitted to [facility] where she was sent to the ER on March 25, 2019 by cardiology. The patient was seen that day by cardiology for routine evaluation and found to have edema of her left leg for a one-week period\par Outpatient duplex was done showing a DVT of the left leg therefore she was sent to the ER.\par \par Patient was admitted and repeat duplex was done showing left-sided below the knee acute nonocclusive DVT without extension into the popliteal vein.\par \par Patient was felt stable to be discharged the next day on Elaquis 2.5 mg twice a day.\par \par Since discharge the patient has felt relatively well with continued swelling of her left leg without pain.\par Direct questioning shows no provoking event such as prolonged car ride or airplane trip, trauma or immobility.\par \par The patient did have one day of rectal bleeding which she thinks started after a difficult bowel movement and bleeding has stopped without recurrence. \par \par NEW NOTE:\par Since patient visit 3 weeks ago she has had no further rectal bleeding.\par She continues on Eliquis about side effects.\par Hematology evaluation scheduled May 20.

## 2019-05-07 NOTE — ASSESSMENT
[FreeTextEntry1] : Status post acute DVT of the left leg on March 25 with patient currently stable on Eliquis.\par About 3 weeks ago she had 2 episodes of right red blood per rectum which has stopped with negative rectal exam.\par Blood work last week showed mild anemia which will be rechecked today.\par Followup in 4 weeks\par Hematology evaluation as scheduled\par Patient given referral for followup duplex of the left leg to be done prior to next visit

## 2019-05-07 NOTE — PHYSICAL EXAM
[No Respiratory Distress] : no respiratory distress  [No Acute Distress] : no acute distress [Normal Rate] : normal rate  [Clear to Auscultation] : lungs were clear to auscultation bilaterally [No Mass] : no mass [Regular Rhythm] : with a regular rhythm [No Stool to Guaiac] : no stool to guaiac [No Focal Deficits] : no focal deficits [Normal Affect] : the affect was normal [de-identified] : +1-2 left leg edema [FreeTextEntry1] : no mass

## 2019-05-08 ENCOUNTER — RESULT REVIEW (OUTPATIENT)
Age: 84
End: 2019-05-08

## 2019-05-08 LAB
BASOPHILS # BLD AUTO: 0.05 K/UL
BASOPHILS NFR BLD AUTO: 0.8 %
EOSINOPHIL # BLD AUTO: 0.09 K/UL
EOSINOPHIL NFR BLD AUTO: 1.4 %
HCT VFR BLD CALC: 36.6 %
HGB BLD-MCNC: 11.3 G/DL
IMM GRANULOCYTES NFR BLD AUTO: 0.3 %
LYMPHOCYTES # BLD AUTO: 1.12 K/UL
LYMPHOCYTES NFR BLD AUTO: 17.9 %
MAN DIFF?: NORMAL
MCHC RBC-ENTMCNC: 30.9 GM/DL
MCHC RBC-ENTMCNC: 32 PG
MCV RBC AUTO: 103.7 FL
MONOCYTES # BLD AUTO: 0.51 K/UL
MONOCYTES NFR BLD AUTO: 8.2 %
NEUTROPHILS # BLD AUTO: 4.46 K/UL
NEUTROPHILS NFR BLD AUTO: 71.4 %
PLATELET # BLD AUTO: 304 K/UL
RBC # BLD: 3.53 M/UL
RBC # FLD: 12.7 %
WBC # FLD AUTO: 6.25 K/UL

## 2019-05-17 ENCOUNTER — OUTPATIENT (OUTPATIENT)
Dept: OUTPATIENT SERVICES | Facility: HOSPITAL | Age: 84
LOS: 1 days | Discharge: HOME | End: 2019-05-17

## 2019-05-17 DIAGNOSIS — Z98.89 OTHER SPECIFIED POSTPROCEDURAL STATES: Chronic | ICD-10-CM

## 2019-05-17 DIAGNOSIS — I80.9 PHLEBITIS AND THROMBOPHLEBITIS OF UNSPECIFIED SITE: ICD-10-CM

## 2019-05-17 DIAGNOSIS — Z96.653 PRESENCE OF ARTIFICIAL KNEE JOINT, BILATERAL: Chronic | ICD-10-CM

## 2019-05-20 ENCOUNTER — APPOINTMENT (OUTPATIENT)
Dept: HEMATOLOGY ONCOLOGY | Facility: CLINIC | Age: 84
End: 2019-05-20
Payer: MEDICARE

## 2019-05-20 VITALS
OXYGEN SATURATION: 96 % | WEIGHT: 112.01 LBS | HEART RATE: 90 BPM | SYSTOLIC BLOOD PRESSURE: 123 MMHG | DIASTOLIC BLOOD PRESSURE: 71 MMHG | BODY MASS INDEX: 20.61 KG/M2 | TEMPERATURE: 98.2 F | HEIGHT: 62 IN

## 2019-05-20 DIAGNOSIS — I83.90 ASYMPTOMATIC VARICOSE VEINS OF UNSPECIFIED LOWER EXTREMITY: ICD-10-CM

## 2019-05-20 DIAGNOSIS — I82.4Z2 ACUTE EMBOLISM AND THROMBOSIS OF UNSPECIFIED DEEP VEINS OF LEFT DISTAL LOWER EXTREMITY: ICD-10-CM

## 2019-05-20 PROCEDURE — 99204 OFFICE O/P NEW MOD 45 MIN: CPT

## 2019-05-20 NOTE — CONSULT LETTER
[Dear  ___] : Dear  [unfilled], [Consult Letter:] : I had the pleasure of evaluating your patient, [unfilled]. [Please see my note below.] : Please see my note below. [Consult Closing:] : Thank you very much for allowing me to participate in the care of this patient.  If you have any questions, please do not hesitate to contact me. [Sincerely,] : Sincerely, [FreeTextEntry3] : Renata Alicia MD\par Medical Oncology/Hematology\par [facility] [place], [facility]\par \par \par [facility]\par

## 2019-05-20 NOTE — ASSESSMENT
[FreeTextEntry1] : 86 year old female with L below the knee DVT diagnosed on 3/26/19.  Her risk factors include her age as well as varicose veins which are independent risk factors for VTE.  She has residual swelling in left calf.\par No hypercoagulable testing warranted given her age.\par \par We discussed duration of anticoagulation to be about 3 months, unless her repeat US of L leg shows persistent DVT.\par \par I would consider a vascular surgery evaluation for varicose veins.  She was reluctant. \par \par She can follow up as needed.

## 2019-05-20 NOTE — PHYSICAL EXAM
[Normal] : affect appropriate [de-identified] : ++varicose veins [de-identified] : multiple eczematous lesions on her both legs

## 2019-07-25 ENCOUNTER — RX RENEWAL (OUTPATIENT)
Age: 84
End: 2019-07-25

## 2019-07-25 ENCOUNTER — MEDICATION RENEWAL (OUTPATIENT)
Age: 84
End: 2019-07-25

## 2019-08-01 ENCOUNTER — APPOINTMENT (OUTPATIENT)
Dept: INTERNAL MEDICINE | Facility: CLINIC | Age: 84
End: 2019-08-01
Payer: MEDICARE

## 2019-08-01 VITALS
WEIGHT: 112 LBS | OXYGEN SATURATION: 96 % | DIASTOLIC BLOOD PRESSURE: 75 MMHG | BODY MASS INDEX: 20.61 KG/M2 | SYSTOLIC BLOOD PRESSURE: 120 MMHG | HEART RATE: 76 BPM | HEIGHT: 62 IN

## 2019-08-01 PROCEDURE — 99213 OFFICE O/P EST LOW 20 MIN: CPT | Mod: 25

## 2019-08-01 PROCEDURE — 36415 COLL VENOUS BLD VENIPUNCTURE: CPT

## 2019-08-05 LAB
ALBUMIN SERPL ELPH-MCNC: 3.9 G/DL
ALP BLD-CCNC: 71 U/L
ALT SERPL-CCNC: 9 U/L
ANION GAP SERPL CALC-SCNC: 14 MMOL/L
AST SERPL-CCNC: 17 U/L
BASOPHILS # BLD AUTO: 0.04 K/UL
BASOPHILS NFR BLD AUTO: 0.7 %
BILIRUB SERPL-MCNC: 0.2 MG/DL
BUN SERPL-MCNC: 19 MG/DL
CALCIUM SERPL-MCNC: 9.4 MG/DL
CHLORIDE SERPL-SCNC: 104 MMOL/L
CHOLEST SERPL-MCNC: 213 MG/DL
CHOLEST/HDLC SERPL: 1.7 RATIO
CO2 SERPL-SCNC: 22 MMOL/L
CREAT SERPL-MCNC: 0.7 MG/DL
EOSINOPHIL # BLD AUTO: 0.1 K/UL
EOSINOPHIL NFR BLD AUTO: 1.8 %
FERRITIN SERPL-MCNC: 15 NG/ML
FOLATE SERPL-MCNC: >20 NG/ML
GLUCOSE SERPL-MCNC: 100 MG/DL
HCT VFR BLD CALC: 34.4 %
HDLC SERPL-MCNC: 123 MG/DL
HGB BLD-MCNC: 10.4 G/DL
IMM GRANULOCYTES NFR BLD AUTO: 0.2 %
IRON SATN MFR SERPL: 8 %
IRON SERPL-MCNC: 31 UG/DL
LDLC SERPL CALC-MCNC: 73 MG/DL
LYMPHOCYTES # BLD AUTO: 1.03 K/UL
LYMPHOCYTES NFR BLD AUTO: 19 %
MAN DIFF?: NORMAL
MCHC RBC-ENTMCNC: 27.7 PG
MCHC RBC-ENTMCNC: 30.2 GM/DL
MCV RBC AUTO: 91.7 FL
MONOCYTES # BLD AUTO: 0.48 K/UL
MONOCYTES NFR BLD AUTO: 8.9 %
NEUTROPHILS # BLD AUTO: 3.76 K/UL
NEUTROPHILS NFR BLD AUTO: 69.4 %
PLATELET # BLD AUTO: 288 K/UL
POTASSIUM SERPL-SCNC: 4.6 MMOL/L
PROT SERPL-MCNC: 7.5 G/DL
RBC # BLD: 3.75 M/UL
RBC # FLD: 14.8 %
SODIUM SERPL-SCNC: 140 MMOL/L
TIBC SERPL-MCNC: 403 UG/DL
TRIGL SERPL-MCNC: 84 MG/DL
UIBC SERPL-MCNC: 372 UG/DL
VIT B12 SERPL-MCNC: 1359 PG/ML
WBC # FLD AUTO: 5.42 K/UL

## 2019-08-12 ENCOUNTER — RESULT CHARGE (OUTPATIENT)
Age: 84
End: 2019-08-12

## 2019-08-12 LAB
DATE COLLECTED: NORMAL
HEMOCCULT 2: NEGATIVE
HEMOCCULT 3: NEGATIVE
HEMOCCULT SP1 STL QL: NEGATIVE
QUALITY CONTROL: YES

## 2019-08-12 NOTE — HEALTH RISK ASSESSMENT
[Yes] : Yes [1 or 2 (0 pts)] : 1 or 2 (0 points) [4 or more  times a week (4 pts)] : 4 or more  times a week (4 points) [No] : In the past 12 months have you used drugs other than those required for medical reasons? No [Never (0 pts)] : Never (0 points) [Audit-CScore] : 4 points

## 2019-08-12 NOTE — ADDENDUM
[FreeTextEntry1] : Labs show-H&H of 10.4/34.4 with iron studies consistent with iron deficiency\par Plan\par -Hemoccults/GI evaluation/repeat labs 3-4 weeks\par \par \par Hemoccults x3 = negative

## 2019-08-12 NOTE — HISTORY OF PRESENT ILLNESS
[FreeTextEntry1] : Patient presents for followup on hypertension/hyperlipidemia/medication check. Patient currently feels well and offers no acute complaints, she is fasting for today's labs.Patient continues on diltiazem for hypertension/SVT and trying to manage her cholesterol dietarily\par -Patient was found to have DVT of the left leg 3/26/19, currently on Eliquis,Saw hematology who recommended anticoagulation for 3 months/no hypercoagulable work was done and followup sonogram rec to ensure resolution. Cardiology report states that with patient with history of PE in 09 and DVT  recommend lifelong anticoagulation.

## 2019-08-12 NOTE — ASSESSMENT
[FreeTextEntry1] : Labs will be sent out/ further recommendations will be made based on lab results. Patient advised to continue present medications with diet/exercise and specialist followup. Patient will return to the office for complete physical exam in 3-4months\par \par \par **Agree with cardiology with need for lifetime anticoagulation, patient understands the risks of stopping and agrees to continue\par \par declines all vaccines\par Declines mammogram/bone density/colonoscopy or cologuard/GYN exam\par specialists include\par 1. Dermatology prn-Dr. Pemberton\par 2.cardio-Dr. Lujan\par 3. Ophthalmology Q year-cannot recall doctor's name at this time\par 4.GI-Dr. Lawson\par 5.Hematology-Dr. Alicia-no follow up needed\par 6.Vascular for VV-rev via hematology=declines\par echo 5/2019\par

## 2019-08-13 ENCOUNTER — RESULT REVIEW (OUTPATIENT)
Age: 84
End: 2019-08-13

## 2019-08-29 ENCOUNTER — RX RENEWAL (OUTPATIENT)
Age: 84
End: 2019-08-29

## 2019-09-03 ENCOUNTER — APPOINTMENT (OUTPATIENT)
Dept: INTERNAL MEDICINE | Facility: CLINIC | Age: 84
End: 2019-09-03

## 2019-10-09 ENCOUNTER — FORM ENCOUNTER (OUTPATIENT)
Age: 84
End: 2019-10-09

## 2019-10-10 ENCOUNTER — APPOINTMENT (OUTPATIENT)
Dept: INTERNAL MEDICINE | Facility: CLINIC | Age: 84
End: 2019-10-10
Payer: MEDICARE

## 2019-10-10 ENCOUNTER — APPOINTMENT (OUTPATIENT)
Dept: MRI IMAGING | Facility: CLINIC | Age: 84
End: 2019-10-10
Payer: MEDICARE

## 2019-10-10 ENCOUNTER — OUTPATIENT (OUTPATIENT)
Dept: OUTPATIENT SERVICES | Facility: HOSPITAL | Age: 84
LOS: 1 days | End: 2019-10-10
Payer: MEDICARE

## 2019-10-10 ENCOUNTER — NON-APPOINTMENT (OUTPATIENT)
Age: 84
End: 2019-10-10

## 2019-10-10 VITALS
HEIGHT: 62 IN | OXYGEN SATURATION: 97 % | SYSTOLIC BLOOD PRESSURE: 140 MMHG | DIASTOLIC BLOOD PRESSURE: 80 MMHG | BODY MASS INDEX: 20.61 KG/M2 | HEART RATE: 87 BPM | WEIGHT: 112 LBS

## 2019-10-10 DIAGNOSIS — R26.89 OTHER ABNORMALITIES OF GAIT AND MOBILITY: ICD-10-CM

## 2019-10-10 DIAGNOSIS — Z96.653 PRESENCE OF ARTIFICIAL KNEE JOINT, BILATERAL: Chronic | ICD-10-CM

## 2019-10-10 DIAGNOSIS — Z00.8 ENCOUNTER FOR OTHER GENERAL EXAMINATION: ICD-10-CM

## 2019-10-10 DIAGNOSIS — Z98.89 OTHER SPECIFIED POSTPROCEDURAL STATES: Chronic | ICD-10-CM

## 2019-10-10 DIAGNOSIS — R42 DIZZINESS AND GIDDINESS: ICD-10-CM

## 2019-10-10 PROCEDURE — 93000 ELECTROCARDIOGRAM COMPLETE: CPT

## 2019-10-10 PROCEDURE — A9585: CPT

## 2019-10-10 PROCEDURE — 99214 OFFICE O/P EST MOD 30 MIN: CPT | Mod: 25

## 2019-10-10 PROCEDURE — 70553 MRI BRAIN STEM W/O & W/DYE: CPT

## 2019-10-10 PROCEDURE — 70553 MRI BRAIN STEM W/O & W/DYE: CPT | Mod: 26

## 2019-10-10 PROCEDURE — 36415 COLL VENOUS BLD VENIPUNCTURE: CPT

## 2019-10-10 NOTE — PHYSICAL EXAM
[No Acute Distress] : no acute distress [Normal Oropharynx] : the oropharynx was normal [Normal Outer Ear/Nose] : the outer ears and nose were normal in appearance [Normal Nasal Mucosa] : the nasal mucosa was normal [Normal TMs] : both tympanic membranes were normal [No Lymphadenopathy] : no lymphadenopathy [No Respiratory Distress] : no respiratory distress  [Clear to Auscultation] : lungs were clear to auscultation bilaterally [Normal Rate] : normal rate  [Regular Rhythm] : with a regular rhythm [Normal Affect] : the affect was normal [Normal Mood] : the mood was normal [PERRL] : pupils equal round and reactive to light [Normal Sclera/Conjunctiva] : normal sclera/conjunctiva [EOMI] : extraocular movements intact [No Focal Deficits] : no focal deficits

## 2019-10-11 ENCOUNTER — RESULT REVIEW (OUTPATIENT)
Age: 84
End: 2019-10-11

## 2019-10-11 LAB
ALBUMIN SERPL ELPH-MCNC: 4.2 G/DL
ALP BLD-CCNC: 74 U/L
ALT SERPL-CCNC: 9 U/L
ANION GAP SERPL CALC-SCNC: 11 MMOL/L
AST SERPL-CCNC: 21 U/L
BASOPHILS # BLD AUTO: 0.03 K/UL
BASOPHILS NFR BLD AUTO: 0.5 %
BILIRUB SERPL-MCNC: 0.2 MG/DL
BUN SERPL-MCNC: 15 MG/DL
CALCIUM SERPL-MCNC: 9.7 MG/DL
CHLORIDE SERPL-SCNC: 100 MMOL/L
CO2 SERPL-SCNC: 25 MMOL/L
CREAT SERPL-MCNC: 0.63 MG/DL
EOSINOPHIL # BLD AUTO: 0.12 K/UL
EOSINOPHIL NFR BLD AUTO: 1.9 %
FERRITIN SERPL-MCNC: 12 NG/ML
GLUCOSE SERPL-MCNC: 104 MG/DL
HCT VFR BLD CALC: 35.7 %
HGB BLD-MCNC: 10.5 G/DL
IMM GRANULOCYTES NFR BLD AUTO: 0.3 %
IRON SATN MFR SERPL: 5 %
IRON SERPL-MCNC: 23 UG/DL
LYMPHOCYTES # BLD AUTO: 0.95 K/UL
LYMPHOCYTES NFR BLD AUTO: 15 %
MAN DIFF?: NORMAL
MCHC RBC-ENTMCNC: 27.3 PG
MCHC RBC-ENTMCNC: 29.4 GM/DL
MCV RBC AUTO: 93 FL
MONOCYTES # BLD AUTO: 0.56 K/UL
MONOCYTES NFR BLD AUTO: 8.8 %
NEUTROPHILS # BLD AUTO: 4.65 K/UL
NEUTROPHILS NFR BLD AUTO: 73.5 %
PLATELET # BLD AUTO: 300 K/UL
POTASSIUM SERPL-SCNC: 4.4 MMOL/L
PROT SERPL-MCNC: 7.8 G/DL
RBC # BLD: 3.84 M/UL
RBC # FLD: 15.6 %
SODIUM SERPL-SCNC: 136 MMOL/L
TIBC SERPL-MCNC: 436 UG/DL
TSH SERPL-ACNC: 2.62 UIU/ML
UIBC SERPL-MCNC: 413 UG/DL
WBC # FLD AUTO: 6.33 K/UL

## 2019-10-11 NOTE — HISTORY OF PRESENT ILLNESS
[Spouse] : spouse [FreeTextEntry8] : Patient presents complaining of dizziness/lightheadedness/off balance on and off for about 5 days, getting worse. Patient has noticed no exacerbating or relieving factors i.e. moving. Patient does not describe it as a spinning. Patient states her vision has not changed and does not have any diplopia. Patient has no associated chest pain/palpitations/dyspnea/vomiting/nausea/sick sx/fever.\par \par **Of note patient was iron deficient with last visit on 8/1, Hemoccults were negative and H&H was 10.4/34.4 and patient was advised to see GI which she did not do

## 2019-10-11 NOTE — ADDENDUM
[FreeTextEntry1] : Labs show\par -H&H of 10.5/35.7, iron studies consistent with iron deficiency\par Plan\par -GI evaluation\par -Iron daily\par -Repeat labs 3 or 4 weeks\par -MRI of the brain as planned to eval sx\par \par MRI of the brain shows no acute findings\par \par s/w pt and  on 10/11 and "better today"\par declines further w/u i.e. cardio/neuro or balance therapy to see if would help\par to call with any issues/relapses

## 2019-10-11 NOTE — ASSESSMENT
[FreeTextEntry1] : Patient adamantly declines ER evaluation, clinically no signs of vertigo/hypotension and EKG shows NSR. Labs sent out. MRI of the brain ordered. Further management based on labs, MRI. Patient will return to the office as scheduled for regular followup\par \par Dr. Ortega was present in office building while I examined patient\par

## 2019-12-05 ENCOUNTER — APPOINTMENT (OUTPATIENT)
Dept: INTERNAL MEDICINE | Facility: CLINIC | Age: 84
End: 2019-12-05
Payer: MEDICARE

## 2019-12-05 ENCOUNTER — OUTPATIENT (OUTPATIENT)
Dept: OUTPATIENT SERVICES | Facility: HOSPITAL | Age: 84
LOS: 1 days | End: 2019-12-05
Payer: MEDICARE

## 2019-12-05 VITALS
WEIGHT: 115 LBS | SYSTOLIC BLOOD PRESSURE: 120 MMHG | DIASTOLIC BLOOD PRESSURE: 80 MMHG | HEIGHT: 62 IN | RESPIRATION RATE: 14 BRPM | HEART RATE: 96 BPM | BODY MASS INDEX: 21.16 KG/M2

## 2019-12-05 DIAGNOSIS — Z96.653 PRESENCE OF ARTIFICIAL KNEE JOINT, BILATERAL: Chronic | ICD-10-CM

## 2019-12-05 DIAGNOSIS — M54.5 LOW BACK PAIN: ICD-10-CM

## 2019-12-05 DIAGNOSIS — Z98.89 OTHER SPECIFIED POSTPROCEDURAL STATES: Chronic | ICD-10-CM

## 2019-12-05 PROCEDURE — 73521 X-RAY EXAM HIPS BI 2 VIEWS: CPT | Mod: 26

## 2019-12-05 PROCEDURE — 36415 COLL VENOUS BLD VENIPUNCTURE: CPT

## 2019-12-05 PROCEDURE — 72100 X-RAY EXAM L-S SPINE 2/3 VWS: CPT | Mod: 26

## 2019-12-05 PROCEDURE — 99213 OFFICE O/P EST LOW 20 MIN: CPT | Mod: 25

## 2019-12-06 LAB
ANION GAP SERPL CALC-SCNC: 11 MMOL/L
BUN SERPL-MCNC: 16 MG/DL
CALCIUM SERPL-MCNC: 9.7 MG/DL
CHLORIDE SERPL-SCNC: 99 MMOL/L
CO2 SERPL-SCNC: 25 MMOL/L
CREAT SERPL-MCNC: 0.66 MG/DL
GLUCOSE SERPL-MCNC: 106 MG/DL
POTASSIUM SERPL-SCNC: 4.4 MMOL/L
SODIUM SERPL-SCNC: 135 MMOL/L

## 2019-12-06 NOTE — PHYSICAL EXAM
[No Acute Distress] : no acute distress [Normal] : no acute distress, well nourished, well developed and well-appearing [Well Nourished] : well nourished [No JVD] : no jugular venous distention [No Lymphadenopathy] : no lymphadenopathy [Supple] : supple [No Edema] : there was no peripheral edema [No Respiratory Distress] : no respiratory distress  [Soft] : abdomen soft [Non Tender] : non-tender [Non-distended] : non-distended [Normal Bowel Sounds] : normal bowel sounds [No Masses] : no abdominal mass palpated [No CVA Tenderness] : no CVA  tenderness [No Spinal Tenderness] : no spinal tenderness [de-identified] : ppears comfortable [de-identified] : no tenderness to palpation. No notable muscle spasm [de-identified] : no vesicular lesions

## 2019-12-06 NOTE — ADDENDUM
[FreeTextEntry1] : X-ray lumbar spine negative for compression fracture\par X-ray hip and pelvis bilaterally negative further acute fracture\par Overall imaging insignificant for any acute findings to explain patient's low back pain.\par \par BMP WNL\par Urine sample was never left by patient. \par \par Acutely i do not see any abnormalities and i relayed these results to patient. She states that she continues to have intermittent pain. I advised that if her pain worsens over the weekend she should go to  for evaluation or follow up again with me on monday. Pt in agreement with plan.

## 2019-12-06 NOTE — HISTORY OF PRESENT ILLNESS
[FreeTextEntry8] : PMH: SVT, pulmonary HTN, anemia, HTN\par 87-year-old woman here today with complaint of left lower back pain that began yesterday morning suddenly. Patient reports concern for possible kidney stone.Patient states that the pain comes and goes and when it does occur it is severe. She reports the pain woke her from her sleep last night. She describes the pain as sharp in nature. Nothing makes this discomfort better or worse. Pain is nonradiating. Denies any other associated symptoms (i.e. skin rash, fever, chills, hematuria, dysuria, fatigue). She also denies any recent falls or heavy lifting. She is not in any pain at this time

## 2019-12-10 LAB
APPEARANCE: CLEAR
BACTERIA: NEGATIVE
BILIRUBIN URINE: NEGATIVE
BLOOD URINE: NEGATIVE
COLOR: NORMAL
GLUCOSE QUALITATIVE U: NEGATIVE
HYALINE CASTS: 1 /LPF
KETONES URINE: NEGATIVE
LEUKOCYTE ESTERASE URINE: NEGATIVE
MICROSCOPIC-UA: NORMAL
NITRITE URINE: NEGATIVE
PH URINE: 6.5
PROTEIN URINE: NEGATIVE
RED BLOOD CELLS URINE: 1 /HPF
SPECIFIC GRAVITY URINE: 1.01
SQUAMOUS EPITHELIAL CELLS: 1 /HPF
UROBILINOGEN URINE: NORMAL
WHITE BLOOD CELLS URINE: 1 /HPF

## 2020-03-18 ENCOUNTER — RX RENEWAL (OUTPATIENT)
Age: 85
End: 2020-03-18

## 2020-05-26 ENCOUNTER — RX RENEWAL (OUTPATIENT)
Age: 85
End: 2020-05-26

## 2020-08-17 ENCOUNTER — APPOINTMENT (OUTPATIENT)
Dept: INTERNAL MEDICINE | Facility: CLINIC | Age: 85
End: 2020-08-17
Payer: MEDICARE

## 2020-08-17 VITALS
RESPIRATION RATE: 16 BRPM | TEMPERATURE: 97.2 F | HEART RATE: 67 BPM | SYSTOLIC BLOOD PRESSURE: 125 MMHG | BODY MASS INDEX: 20.98 KG/M2 | HEIGHT: 62 IN | DIASTOLIC BLOOD PRESSURE: 70 MMHG | WEIGHT: 114 LBS

## 2020-08-17 DIAGNOSIS — R35.0 FREQUENCY OF MICTURITION: ICD-10-CM

## 2020-08-17 DIAGNOSIS — R39.15 URGENCY OF URINATION: ICD-10-CM

## 2020-08-17 DIAGNOSIS — R31.9 HEMATURIA, UNSPECIFIED: ICD-10-CM

## 2020-08-17 LAB
BILIRUB UR QL STRIP: NEGATIVE
CLARITY UR: NORMAL
COLLECTION METHOD: NORMAL
GLUCOSE UR-MCNC: NEGATIVE
HCG UR QL: 0.2 EU/DL
HGB UR QL STRIP.AUTO: NORMAL
KETONES UR-MCNC: NEGATIVE
LEUKOCYTE ESTERASE UR QL STRIP: NORMAL
NITRITE UR QL STRIP: POSITIVE
PH UR STRIP: 6
PROT UR STRIP-MCNC: 300
SP GR UR STRIP: 1.02

## 2020-08-17 PROCEDURE — 99213 OFFICE O/P EST LOW 20 MIN: CPT | Mod: 25

## 2020-08-17 PROCEDURE — 81002 URINALYSIS NONAUTO W/O SCOPE: CPT

## 2020-08-17 NOTE — HISTORY OF PRESENT ILLNESS
[FreeTextEntry8] : The patient presents with an acute issue that started 2 days ago when she has had increased urinary frequency and urgency with difficulty controlling her urine. This is different than her usual occasional urinary symptoms.\par She denies burning with urination, hematuria, fever, chills, back pain. She does have some lower abdominal discomfort.\par No recurrent history of UTIs.\par

## 2020-08-17 NOTE — PHYSICAL EXAM
[No Respiratory Distress] : no respiratory distress  [No Accessory Muscle Use] : no accessory muscle use [No Acute Distress] : no acute distress [Normal Rate] : normal rate  [Regular Rhythm] : with a regular rhythm [Clear to Auscultation] : lungs were clear to auscultation bilaterally [Non-distended] : non-distended [No Edema] : there was no peripheral edema [Soft] : abdomen soft [Normal Gait] : normal gait [No HSM] : no HSM [Normal Affect] : the affect was normal [No Masses] : no abdominal mass palpated [de-identified] : mild suprapubic tenderness

## 2020-08-17 NOTE — ASSESSMENT
[FreeTextEntry1] : patient signs and symptoms compatible with UTI with abnormal UA.\par Therefore urine will be sent for culture and patient encouraged to drink a lot of water.\par Start Bactrim DS one tablet twice a day for 5 days

## 2020-08-19 LAB — BACTERIA UR CULT: ABNORMAL

## 2020-12-08 ENCOUNTER — APPOINTMENT (OUTPATIENT)
Dept: INTERNAL MEDICINE | Facility: CLINIC | Age: 85
End: 2020-12-08
Payer: MEDICARE

## 2020-12-08 VITALS
OXYGEN SATURATION: 96 % | BODY MASS INDEX: 20.24 KG/M2 | DIASTOLIC BLOOD PRESSURE: 75 MMHG | HEIGHT: 62 IN | WEIGHT: 110 LBS | HEART RATE: 68 BPM | SYSTOLIC BLOOD PRESSURE: 115 MMHG | RESPIRATION RATE: 14 BRPM | TEMPERATURE: 97.2 F

## 2020-12-08 DIAGNOSIS — G47.00 INSOMNIA, UNSPECIFIED: ICD-10-CM

## 2020-12-08 DIAGNOSIS — L29.9 PRURITUS, UNSPECIFIED: ICD-10-CM

## 2020-12-08 PROCEDURE — 99213 OFFICE O/P EST LOW 20 MIN: CPT | Mod: 25

## 2020-12-08 PROCEDURE — 36415 COLL VENOUS BLD VENIPUNCTURE: CPT

## 2020-12-08 RX ORDER — SULFAMETHOXAZOLE AND TRIMETHOPRIM 800; 160 MG/1; MG/1
800-160 TABLET ORAL TWICE DAILY
Qty: 10 | Refills: 0 | Status: DISCONTINUED | COMMUNITY
Start: 2020-08-17 | End: 2020-12-08

## 2020-12-08 NOTE — PHYSICAL EXAM
[No Acute Distress] : no acute distress [No Respiratory Distress] : no respiratory distress  [Clear to Auscultation] : lungs were clear to auscultation bilaterally [Normal Rate] : normal rate  [Regular Rhythm] : with a regular rhythm [Normal Affect] : the affect was normal [Normal Mood] : the mood was normal [Normal Outer Ear/Nose] : the outer ears and nose were normal in appearance [Normal Oropharynx] : the oropharynx was normal [Normal TMs] : both tympanic membranes were normal [Normal Nasal Mucosa] : the nasal mucosa was normal [Soft] : abdomen soft [Non Tender] : non-tender [Normal Bowel Sounds] : normal bowel sounds

## 2020-12-09 ENCOUNTER — INPATIENT (INPATIENT)
Facility: HOSPITAL | Age: 85
LOS: 1 days | Discharge: HOME | DRG: 378 | End: 2020-12-11
Attending: HOSPITALIST | Admitting: HOSPITALIST
Payer: MEDICARE

## 2020-12-09 ENCOUNTER — NON-APPOINTMENT (OUTPATIENT)
Age: 85
End: 2020-12-09

## 2020-12-09 VITALS
DIASTOLIC BLOOD PRESSURE: 75 MMHG | OXYGEN SATURATION: 98 % | SYSTOLIC BLOOD PRESSURE: 125 MMHG | RESPIRATION RATE: 20 BRPM | TEMPERATURE: 99 F | WEIGHT: 89.95 LBS | HEART RATE: 86 BPM | HEIGHT: 65 IN

## 2020-12-09 DIAGNOSIS — Z80.0 FAMILY HISTORY OF MALIGNANT NEOPLASM OF DIGESTIVE ORGANS: ICD-10-CM

## 2020-12-09 DIAGNOSIS — Z96.653 PRESENCE OF ARTIFICIAL KNEE JOINT, BILATERAL: Chronic | ICD-10-CM

## 2020-12-09 DIAGNOSIS — D50.9 IRON DEFICIENCY ANEMIA, UNSPECIFIED: ICD-10-CM

## 2020-12-09 DIAGNOSIS — K92.2 GASTROINTESTINAL HEMORRHAGE, UNSPECIFIED: ICD-10-CM

## 2020-12-09 DIAGNOSIS — Z98.89 OTHER SPECIFIED POSTPROCEDURAL STATES: Chronic | ICD-10-CM

## 2020-12-09 LAB
ALBUMIN SERPL ELPH-MCNC: 3.9 G/DL — SIGNIFICANT CHANGE UP (ref 3.3–5.2)
ALBUMIN SERPL ELPH-MCNC: 4.3 G/DL
ALP BLD-CCNC: 88 U/L
ALP SERPL-CCNC: 80 U/L — SIGNIFICANT CHANGE UP (ref 40–120)
ALT FLD-CCNC: 8 U/L — SIGNIFICANT CHANGE UP
ALT SERPL-CCNC: 6 U/L
ANION GAP SERPL CALC-SCNC: 13 MMOL/L
ANION GAP SERPL CALC-SCNC: 8 MMOL/L — SIGNIFICANT CHANGE UP (ref 5–17)
APTT BLD: 28 SEC — SIGNIFICANT CHANGE UP (ref 27.5–35.5)
AST SERPL-CCNC: 21 U/L
AST SERPL-CCNC: 25 U/L — SIGNIFICANT CHANGE UP
BASOPHILS # BLD AUTO: 0.04 K/UL
BASOPHILS # BLD AUTO: 0.04 K/UL — SIGNIFICANT CHANGE UP (ref 0–0.2)
BASOPHILS NFR BLD AUTO: 0.8 %
BASOPHILS NFR BLD AUTO: 0.9 % — SIGNIFICANT CHANGE UP (ref 0–2)
BILIRUB SERPL-MCNC: 0.2 MG/DL
BILIRUB SERPL-MCNC: 0.3 MG/DL — LOW (ref 0.4–2)
BLD GP AB SCN SERPL QL: SIGNIFICANT CHANGE UP
BUN SERPL-MCNC: 13 MG/DL — SIGNIFICANT CHANGE UP (ref 8–20)
BUN SERPL-MCNC: 16 MG/DL
CALCIUM SERPL-MCNC: 9.2 MG/DL — SIGNIFICANT CHANGE UP (ref 8.6–10.2)
CALCIUM SERPL-MCNC: 9.4 MG/DL
CHLORIDE SERPL-SCNC: 104 MMOL/L
CHLORIDE SERPL-SCNC: 106 MMOL/L — SIGNIFICANT CHANGE UP (ref 98–107)
CO2 SERPL-SCNC: 22 MMOL/L
CO2 SERPL-SCNC: 23 MMOL/L — SIGNIFICANT CHANGE UP (ref 22–29)
CREAT SERPL-MCNC: 0.62 MG/DL — SIGNIFICANT CHANGE UP (ref 0.5–1.3)
CREAT SERPL-MCNC: 0.74 MG/DL
EOSINOPHIL # BLD AUTO: 0.1 K/UL
EOSINOPHIL # BLD AUTO: 0.14 K/UL — SIGNIFICANT CHANGE UP (ref 0–0.5)
EOSINOPHIL NFR BLD AUTO: 2.1 %
EOSINOPHIL NFR BLD AUTO: 3 % — SIGNIFICANT CHANGE UP (ref 0–6)
FERRITIN SERPL-MCNC: 6 NG/ML
FERRITIN SERPL-MCNC: 7 NG/ML — LOW (ref 15–150)
FOLATE SERPL-MCNC: 18 NG/ML — SIGNIFICANT CHANGE UP
FOLATE SERPL-MCNC: >20 NG/ML
GLUCOSE SERPL-MCNC: 126 MG/DL
GLUCOSE SERPL-MCNC: 81 MG/DL — SIGNIFICANT CHANGE UP (ref 70–99)
HCT VFR BLD CALC: 26 % — LOW (ref 34.5–45)
HCT VFR BLD CALC: 29.4 %
HGB BLD-MCNC: 7.1 G/DL — LOW (ref 11.5–15.5)
HGB BLD-MCNC: 7.7 G/DL
IMM GRANULOCYTES NFR BLD AUTO: 0.4 %
INR BLD: 1.15 RATIO — SIGNIFICANT CHANGE UP (ref 0.88–1.16)
IRON SATN MFR SERPL: 12 UG/DL — LOW (ref 37–145)
IRON SATN MFR SERPL: 2 % — LOW (ref 14–50)
IRON SATN MFR SERPL: 3 %
IRON SERPL-MCNC: 14 UG/DL
LYMPHOCYTES # BLD AUTO: 0.76 K/UL
LYMPHOCYTES # BLD AUTO: 0.87 K/UL — LOW (ref 1–3.3)
LYMPHOCYTES # BLD AUTO: 18.7 % — SIGNIFICANT CHANGE UP (ref 13–44)
LYMPHOCYTES NFR BLD AUTO: 16 %
MAN DIFF?: NORMAL
MCHC RBC-ENTMCNC: 19.9 PG
MCHC RBC-ENTMCNC: 19.9 PG — LOW (ref 27–34)
MCHC RBC-ENTMCNC: 26.2 GM/DL
MCHC RBC-ENTMCNC: 27.3 GM/DL — LOW (ref 32–36)
MCV RBC AUTO: 72.8 FL — LOW (ref 80–100)
MCV RBC AUTO: 76.2 FL
MONOCYTES # BLD AUTO: 0.4 K/UL
MONOCYTES # BLD AUTO: 0.49 K/UL — SIGNIFICANT CHANGE UP (ref 0–0.9)
MONOCYTES NFR BLD AUTO: 10.5 % — SIGNIFICANT CHANGE UP (ref 2–14)
MONOCYTES NFR BLD AUTO: 8.4 %
NEUTROPHILS # BLD AUTO: 3.1 K/UL — SIGNIFICANT CHANGE UP (ref 1.8–7.4)
NEUTROPHILS # BLD AUTO: 3.44 K/UL
NEUTROPHILS NFR BLD AUTO: 66.7 % — SIGNIFICANT CHANGE UP (ref 43–77)
NEUTROPHILS NFR BLD AUTO: 72.3 %
OB PNL STL: NEGATIVE — SIGNIFICANT CHANGE UP
PLATELET # BLD AUTO: 405 K/UL — HIGH (ref 150–400)
PLATELET # BLD AUTO: 447 K/UL
POTASSIUM SERPL-MCNC: 4.1 MMOL/L — SIGNIFICANT CHANGE UP (ref 3.5–5.3)
POTASSIUM SERPL-SCNC: 4.1 MMOL/L — SIGNIFICANT CHANGE UP (ref 3.5–5.3)
POTASSIUM SERPL-SCNC: 4.2 MMOL/L
PROT SERPL-MCNC: 7.6 G/DL — SIGNIFICANT CHANGE UP (ref 6.6–8.7)
PROT SERPL-MCNC: 8.1 G/DL
PROTHROM AB SERPL-ACNC: 13.3 SEC — SIGNIFICANT CHANGE UP (ref 10.6–13.6)
RBC # BLD: 3.57 M/UL — LOW (ref 3.8–5.2)
RBC # BLD: 3.86 M/UL
RBC # FLD: 17.5 % — HIGH (ref 10.3–14.5)
RBC # FLD: 18 %
SARS-COV-2 RNA SPEC QL NAA+PROBE: SIGNIFICANT CHANGE UP
SODIUM SERPL-SCNC: 137 MMOL/L — SIGNIFICANT CHANGE UP (ref 135–145)
SODIUM SERPL-SCNC: 139 MMOL/L
TIBC SERPL-MCNC: 490 UG/DL
TIBC SERPL-MCNC: 542 UG/DL — HIGH (ref 220–430)
TRANSFERRIN SERPL-MCNC: 379 MG/DL — SIGNIFICANT CHANGE UP (ref 192–382)
TSH SERPL-ACNC: 2.25 UIU/ML
UIBC SERPL-MCNC: 476 UG/DL
VIT B12 SERPL-MCNC: 825 PG/ML
VIT B12 SERPL-MCNC: 842 PG/ML — SIGNIFICANT CHANGE UP (ref 232–1245)
WBC # BLD: 4.65 K/UL — SIGNIFICANT CHANGE UP (ref 3.8–10.5)
WBC # FLD AUTO: 4.65 K/UL — SIGNIFICANT CHANGE UP (ref 3.8–10.5)
WBC # FLD AUTO: 4.76 K/UL

## 2020-12-09 PROCEDURE — 99223 1ST HOSP IP/OBS HIGH 75: CPT

## 2020-12-09 PROCEDURE — 99223 1ST HOSP IP/OBS HIGH 75: CPT | Mod: AI

## 2020-12-09 PROCEDURE — 93010 ELECTROCARDIOGRAM REPORT: CPT

## 2020-12-09 PROCEDURE — 99285 EMERGENCY DEPT VISIT HI MDM: CPT | Mod: CS

## 2020-12-09 RX ORDER — DILTIAZEM HCL 120 MG
180 CAPSULE, EXT RELEASE 24 HR ORAL DAILY
Refills: 0 | Status: DISCONTINUED | OUTPATIENT
Start: 2020-12-09 | End: 2020-12-11

## 2020-12-09 RX ORDER — PANTOPRAZOLE SODIUM 20 MG/1
40 TABLET, DELAYED RELEASE ORAL DAILY
Refills: 0 | Status: DISCONTINUED | OUTPATIENT
Start: 2020-12-10 | End: 2020-12-11

## 2020-12-09 RX ORDER — PANTOPRAZOLE SODIUM 20 MG/1
40 TABLET, DELAYED RELEASE ORAL ONCE
Refills: 0 | Status: COMPLETED | OUTPATIENT
Start: 2020-12-09 | End: 2020-12-09

## 2020-12-09 RX ORDER — FOLIC ACID 0.8 MG
1 TABLET ORAL DAILY
Refills: 0 | Status: DISCONTINUED | OUTPATIENT
Start: 2020-12-09 | End: 2020-12-11

## 2020-12-09 RX ADMIN — PANTOPRAZOLE SODIUM 40 MILLIGRAM(S): 20 TABLET, DELAYED RELEASE ORAL at 11:22

## 2020-12-09 NOTE — ED PROVIDER NOTE - PROGRESS NOTE DETAILS
Salvador: hgb 7.1 today. 7.7 yesterday. 13 last march. with reported black stool, and poor occult blood sample. HD stable. did not take eliquis today. protonix/1uprbc ordered. gi consulted. no reversal eliquis at this time, admitted to Dr. Jones

## 2020-12-09 NOTE — ASSESSMENT
[FreeTextEntry1] : Venipuncture done in our office, Labs sent out.Trial of trazodone 50 mg q.h.s. given. Further management pending blood work and progress on trazodone. Patient will report any new symptoms/issues and schedule a physical in the near future\par \par Dr. Ortega was present in office building while I examined patient\par

## 2020-12-09 NOTE — H&P ADULT - ASSESSMENT
88F with a history of atrial fibrillation, 88F with a history of atrial fibrillation and hypertension who presented with black stools and found to have anemia on outpatient laboratory studies.    Anemia - Microcytic. Suspect secondary to upper gastrointestinal hemorrhage. 88F with a history of atrial fibrillation and hypertension who presented with black stools and found to have anemia on outpatient laboratory studies.    Anemia - Microcytic. Suspect secondary to upper gastrointestinal hemorrhage on anticoagulation for atrial fibrillation. For transfusion of packed red blood cells. CBC to be monitored. Hemodynamically stable. Gastroenterology consultaton requested by the emergency department.    Atrial fibrillation - On diltiazem. Apixaban to be held in the setting of suspected bleeding.    Hypertension - Close blood pressure monitoring.

## 2020-12-09 NOTE — H&P ADULT - HISTORY OF PRESENT ILLNESS
88F referred to the hospital by her primary care physician for anemia noted on outpatient laboratory studies. 88F referred to the hospital by her primary care physician for anemia noted on outpatient laboratory studies. The patient had reported that she had been feeling week and without energy for the past week which led to her visit to her primary care physician. She denied any chest pain, palpitation, dyspnea, or lightheadedness. She did note an episode of black stool yesterday which was not associated with abdominal pain or diarrhea. She denied any similar symptoms in the past. She denied any history of abdominal pain or reflux. She noted using ibuprofen on occasion but not daily. She reported a remote history of colonoscopy in the past but has never had an upper endoscopy. She is unaware of any aggravating or relieving factors. She lives at home with her  and has been without dyspnea, cough, fevers, chills, or sick contacts. 88F referred to the hospital by her primary care physician for anemia noted on outpatient laboratory studies. The patient had reported that she had been feeling week and without energy for the past week which led to her visit to her primary care physician. She denied any chest pain, palpitation, dyspnea, or lightheadedness. She did note an episode of black stool yesterday which was not associated with abdominal pain or diarrhea. She denied any similar symptoms in the past. She denied any history of abdominal pain or reflux. She noted using ibuprofen on occasion but not daily. She reported a remote history of colonoscopy in the past but has never had an upper endoscopy. In the emergency department, rectal examination was reported to be without stool.  She is unaware of any aggravating or relieving factors. She lives at home with her  and has been without dyspnea, cough, fevers, chills, or sick contacts.

## 2020-12-09 NOTE — ED PROVIDER NOTE - CLINICAL SUMMARY MEDICAL DECISION MAKING FREE TEXT BOX
reports black stool, hgb yesterday 7.7, with last year was >13. some weakness/lower bp at home. on eliquis did not take today. occult blood sample with minimal stool as little in stool. labs, protonix, possible gi and admission.

## 2020-12-09 NOTE — ED ADULT TRIAGE NOTE - CHIEF COMPLAINT QUOTE
Pt arrives to ED stating " My doctor sent me here because I need blood transfusion " Denies chest pain

## 2020-12-09 NOTE — HISTORY OF PRESENT ILLNESS
[Spouse] : spouse [FreeTextEntry8] : Patient presents complaining of fatigue/insomnia.  thinks her insomnia is secondary to her chronic pruritus of which she has seen dermatology and tried multiple treatment options without benefit. Patient states she generally lays down for bed at around 10:00 PM and just lies there till about 4 AM staring at the clock and is averaging about 6 hours of sleep per night Despite Benadryl, Tylenol PM and melatonin trials in the past . Patient is eating and drinking normally, has no urinary symptoms but has history of UTI therefore would like it ruled out. Patient's bowels are occasionally constipated of which she uses MiraLax with benefit, no blood in the stool or abdominal pain. Patient states she is up-to-date with cardiology and has no chest pain/palpitations/dyspnea\par \par **Of note patient's blood work last year showed anemia, she did not followup to repeat

## 2020-12-09 NOTE — CONSULT NOTE ADULT - SUBJECTIVE AND OBJECTIVE BOX
Patient is a 88y old  Female who presents with a chief complaint of     HPI:  88F referred to the hospital by her primary care physician for anemia noted on outpatient laboratory studies. The patient had reported that she had been feeling week and without energy for the past week which led to her visit to her primary care physician. She denied any chest pain, palpitation, dyspnea, or lightheadedness. She did note an episode of black stool yesterday but also takes iron. She denied any similar symptoms in the past. She denied any history of abdominal pain or reflux. She noted using ibuprofen on occasion but not daily. She reported a remote history of colonoscopy in the past many years ago but has never had an upper endoscopy. She is unaware of any aggravating or relieving factors. She lives at home with her  and has been without dyspnea, cough, fevers, chills, or sick contacts. (09 Dec 2020 12:53)    She began to exhibit the onset of mild iron deficiency anemia about mid year 2019 with last CBC and iron studies in the fall of 2019 with iron deficiency iron studies and a very mild anemia. Blood tests yesterday with marked iron deficiency numbers and Hgb of around 7.7. For at least the past year she has had occasional constipation with no BM for several days and occasional urgency with a loose stool. She was seen by Dr. Lawson in early 2019 when a cat scan showed some colitis in the ascending colon. He suggested a colonoscopy but the patient deferred. Her sister had colon cancer. She denies any rectal bleeding and only one black BM yesterday. Today stool for OB was negative. She takes eliquis for prior DVT in early 2019 and hx of PE. Denies any dyspeptic symptoms or abdominal pain. No red blood per rectum. Her appetite and weight are stable. She takes advil daily for joint pain.    REVIEW OF SYSTEMS:    CONSTITUTIONAL: No fever, weight loss, or fatigue  EYES: No eye pain, visual disturbances, or discharge  ENMT:  No difficulty hearing, tinnitus, vertigo; No sinus or throat pain  NECK: No pain or stiffness  RESPIRATORY: No cough, wheezing, chills or hemoptysis; No shortness of breath  CARDIOVASCULAR: No chest pain, palpitations, dizziness, or leg swelling  GASTROINTESTINAL: as above  NEUROLOGICAL: No headaches, memory loss, loss of strength, numbness, or tremors  SKIN: No itching, burning, rashes, or lesions   LYMPH NODES: No enlarged glands  MUSCULOSKELETAL: No joint pain or swelling; No muscle, back, or extremity pain  PSYCHIATRIC: No depression, anxiety, mood swings, or difficulty sleeping  HEME/LYMPH: No easy bruising, or bleeding gums  ALLERY AND IMMUNOLOGIC: No hives or eczema      PAST MEDICAL & SURGICAL HISTORY:  Afib  controlled with Diltiazem    Osteoarthritis    Hypertension    H/O bladder repair surgery  2007    H/O total knee replacement, bilateral    SVT  pulmonary hypertension     DVT    pulmonary embolism    hyperlipidemia    ?IBS    Stasis dermatitis    DJD    TB          FAMILY HISTORY:  Family hx of colon cancer  sister        SOCIAL HISTORY:  Smoking Status: [ ] Current, [ ] Former, [ x] Never  Pack Years:  Alcohol Use: social    Home Medications:  ascorbic acid 500 mg oral tablet: 1 tab(s) orally once a day (19 Jul 2016 16:35)  aspirin 81 mg oral delayed release tablet: 1 tab(s) orally once a day (19 Jul 2016 16:35)  Multiple Vitamins oral tablet: 1 tab(s) orally once a day (19 Jul 2016 16:35)  senna oral tablet: 2 tab(s) orally once a day (at bedtime), As needed, Constipation (19 Jul 2016 16:35)      MEDICATIONS:  MEDICATIONS  (STANDING):  diltiazem    milliGRAM(s) Oral daily  folic acid 1 milliGRAM(s) Oral daily    MEDICATIONS  (PRN):      Allergies    No Known Allergies    Intolerances        Vital Signs Last 24 Hrs  T(C): 37 (09 Dec 2020 09:57), Max: 37 (09 Dec 2020 09:57)  T(F): 98.6 (09 Dec 2020 09:57), Max: 98.6 (09 Dec 2020 09:57)  HR: 88 (09 Dec 2020 11:01) (86 - 88)  BP: 128/68 (09 Dec 2020 11:01) (125/75 - 128/68)  BP(mean): --  RR: 17 (09 Dec 2020 11:01) (17 - 20)  SpO2: 99% (09 Dec 2020 11:01) (98% - 99%)        PHYSICAL EXAM:    General: thin white female, in no acute distress  HEENT: MMM, conjunctiva and sclera clear  Lungs: Clear  Heart: Rhythm Regular, No Murmurs  Gastrointestinal: Soft, non-tender non-distended; Normal bowel sounds; No rebound or guarding; No Organomegaly & No Masses  Extremities: Normal range of motion, No clubbing, cyanosis or edema, some muscle wasting noted  Neurological: Alert and oriented x3, Non-focal, very poor memory and forgot the month  Skin: Warm and dry. No obvious rash, excoriated areas on back from scratching  Rectal: No Masses, empty vault      LABS:                        7.1    4.65  )-----------( 405      ( 09 Dec 2020 10:47 )             26.0     12-09    137  |  106  |  13.0  ----------------------------<  81  4.1   |  23.0  |  0.62    Ca    9.2      09 Dec 2020 10:47    TPro  7.6  /  Alb  3.9  /  TBili  0.3<L>  /  DBili  x   /  AST  25  /  ALT  8   /  AlkPhos  80  12-09

## 2020-12-09 NOTE — ED PROVIDER NOTE - PHYSICAL EXAMINATION
Gen: No acute distress, non toxic  HEENT: Mucous membranes moist, pink conjunctivae, EOMI  CV: RRR, nl s1/s2.  Resp: CTAB, normal rate and effort  GI: Abdomen soft, NT, ND. No rebound, no guarding.r ectal chaperoned by LUC Rivera, with minimal stool on 2 attempts, small white mucus.   : No CVAT  Neuro: A&O x 3, moving all 4 extremities  MSK: No spine or joint tenderness to palpation  Skin: No rashes. intact and perfused.

## 2020-12-09 NOTE — H&P ADULT - NSHPPHYSICALEXAM_GEN_ALL_CORE
Vital Signs Last 24 Hrs  T(C): 37 (09 Dec 2020 09:57), Max: 37 (09 Dec 2020 09:57)  T(F): 98.6 (09 Dec 2020 09:57), Max: 98.6 (09 Dec 2020 09:57)  HR: 88 (09 Dec 2020 11:01) (86 - 88)  BP: 128/68 (09 Dec 2020 11:01) (125/75 - 128/68)  BP(mean): --  RR: 17 (09 Dec 2020 11:01) (17 - 20)  SpO2: 99% (09 Dec 2020 11:01) (98% - 99%)    General appearance: No acute distress, Awake, Alert  HEENT: Normocephalic, Atraumatic, Conjunctiva clear, EOMI  Neck: Supple, No JVD, No tenderness  Lungs: Breath sound equal bilaterally, No wheezes, No rales  Cardiovascular: S1S2, Irregular rhythm  Abdomen: Soft, Nontender, Nondistended, No guarding/rebound, Positive bowel sounds  Extremities: No clubbing, No cyanosis, No edema, No calf tenderness  Neuro: Strength equal bilaterally, No tremors  Psychiatric: Appropriate mood, Normal affect

## 2020-12-09 NOTE — CONSULT NOTE ADULT - PROBLEM SELECTOR RECOMMENDATION 9
rule out colonic neoplasm. Will plan for EGD/ Colonoscopy on Friday after she has been off the eliquis for 48 hours. Need Covid 19 swab stat preop and will need cardiac clearance. Clear liquid diet and transfuse to Hgb over 8. Pantoprazole for cytoportection.

## 2020-12-09 NOTE — H&P ADULT - NSICDXPASTSURGICALHX_GEN_ALL_CORE_FT
PAST SURGICAL HISTORY:  H/O bladder repair surgery 2007    H/O total knee replacement, bilateral

## 2020-12-09 NOTE — ED ADULT NURSE NOTE - OBJECTIVE STATEMENT
Pt with no symptoms at time of RN assessment states her PMD sent her to [facility] for possible blood transfusion due to 1 day of black stools. Pt states she is taking eliquis and was told not to take her dose this morning. Pt skin appears pink and pt denies chest pains, abd pains, SOB, dizziness, HA, nausea, vomiting, fever, chills, bladder bowel dysfunction. Although pt does report firm stools compared to her norm.

## 2020-12-09 NOTE — ED PROVIDER NOTE - OBJECTIVE STATEMENT
87 y/o female hx afib on eliquis (did not take today), htn sent for eval/transfusion after labs yesterday with hgb 7.7 and low ferritin/iron. Pt has been having lower bp recently per pt//pcp Dr. Ortega. Feeling weaker and with dark black stool past few days. No hx gi bleed, no recent endoscopy/colonoscopy. No f/c, cp, sob, abd pain.    ROS: No fever/chills. No eye pain/changes in vision, No ear pain/sore throat/dysphagia, No chest pain/palpitations. No SOB/cough/. No abdominal pain, N/V/D,  No dysuria/frequency/discharge, No headache. No Dizziness.    No rashes or breaks in skin. No numbness/tingling/weakness.

## 2020-12-10 DIAGNOSIS — D50.9 IRON DEFICIENCY ANEMIA, UNSPECIFIED: ICD-10-CM

## 2020-12-10 LAB
ANION GAP SERPL CALC-SCNC: 10 MMOL/L — SIGNIFICANT CHANGE UP (ref 5–17)
BUN SERPL-MCNC: 13 MG/DL — SIGNIFICANT CHANGE UP (ref 8–20)
CALCIUM SERPL-MCNC: 9 MG/DL — SIGNIFICANT CHANGE UP (ref 8.6–10.2)
CHLORIDE SERPL-SCNC: 104 MMOL/L — SIGNIFICANT CHANGE UP (ref 98–107)
CO2 SERPL-SCNC: 24 MMOL/L — SIGNIFICANT CHANGE UP (ref 22–29)
CREAT SERPL-MCNC: 0.59 MG/DL — SIGNIFICANT CHANGE UP (ref 0.5–1.3)
GLUCOSE SERPL-MCNC: 82 MG/DL — SIGNIFICANT CHANGE UP (ref 70–99)
HCT VFR BLD CALC: 28 % — LOW (ref 34.5–45)
HGB BLD-MCNC: 8.1 G/DL — LOW (ref 11.5–15.5)
MCHC RBC-ENTMCNC: 21.8 PG — LOW (ref 27–34)
MCHC RBC-ENTMCNC: 28.9 GM/DL — LOW (ref 32–36)
MCV RBC AUTO: 75.5 FL — LOW (ref 80–100)
PLATELET # BLD AUTO: 364 K/UL — SIGNIFICANT CHANGE UP (ref 150–400)
POTASSIUM SERPL-MCNC: 3.7 MMOL/L — SIGNIFICANT CHANGE UP (ref 3.5–5.3)
POTASSIUM SERPL-SCNC: 3.7 MMOL/L — SIGNIFICANT CHANGE UP (ref 3.5–5.3)
RBC # BLD: 3.71 M/UL — LOW (ref 3.8–5.2)
RBC # FLD: 18.4 % — HIGH (ref 10.3–14.5)
SODIUM SERPL-SCNC: 138 MMOL/L — SIGNIFICANT CHANGE UP (ref 135–145)
WBC # BLD: 4.47 K/UL — SIGNIFICANT CHANGE UP (ref 3.8–10.5)
WBC # FLD AUTO: 4.47 K/UL — SIGNIFICANT CHANGE UP (ref 3.8–10.5)

## 2020-12-10 PROCEDURE — 99232 SBSQ HOSP IP/OBS MODERATE 35: CPT

## 2020-12-10 PROCEDURE — 99233 SBSQ HOSP IP/OBS HIGH 50: CPT

## 2020-12-10 PROCEDURE — 93306 TTE W/DOPPLER COMPLETE: CPT | Mod: 26

## 2020-12-10 RX ORDER — SOD SULF/SODIUM/NAHCO3/KCL/PEG
1000 SOLUTION, RECONSTITUTED, ORAL ORAL
Refills: 0 | Status: COMPLETED | OUTPATIENT
Start: 2020-12-10 | End: 2020-12-10

## 2020-12-10 RX ADMIN — PANTOPRAZOLE SODIUM 40 MILLIGRAM(S): 20 TABLET, DELAYED RELEASE ORAL at 08:43

## 2020-12-10 RX ADMIN — Medication 1 MILLIGRAM(S): at 08:43

## 2020-12-10 RX ADMIN — Medication 1000 MILLILITER(S): at 23:17

## 2020-12-10 RX ADMIN — Medication 180 MILLIGRAM(S): at 05:24

## 2020-12-10 RX ADMIN — Medication 1000 MILLILITER(S): at 22:04

## 2020-12-10 NOTE — PROGRESS NOTE ADULT - SUBJECTIVE AND OBJECTIVE BOX
Pt seen and examined. No clinically overt GI bleeding. Iron studies c/w SHAHIDA. For EGD/ Colon tomorrow off of Eliquis. Cardiac clearance still pending. Post transfusion Hb also pending.     REVIEW OF SYSTEMS:  Constitutional: No fever, weight loss or fatigue  Cardiovascular: No chest pain, palpitations, dizziness or leg swelling  Gastrointestinal: As noted above. No abdominal or epigastric pain. No nausea, vomiting or hematemesis; No diarrhea or constipation. No melena or hematochezia.  Skin: No itching, burning, rashes or lesions       MEDICATIONS:  MEDICATIONS  (STANDING):  diltiazem    milliGRAM(s) Oral daily  folic acid 1 milliGRAM(s) Oral daily  pantoprazole  Injectable 40 milliGRAM(s) IV Push daily    MEDICATIONS  (PRN):      Allergies    No Known Allergies    Intolerances        Vital Signs Last 24 Hrs  T(C): 36.6 (10 Dec 2020 04:23), Max: 37 (09 Dec 2020 09:57)  T(F): 97.9 (10 Dec 2020 04:23), Max: 98.6 (09 Dec 2020 09:57)  HR: 75 (10 Dec 2020 04:23) (75 - 93)  BP: 143/71 (10 Dec 2020 04:23) (116/53 - 153/67)  BP(mean): --  RR: 18 (10 Dec 2020 04:23) (16 - 20)  SpO2: 98% (10 Dec 2020 04:23) (97% - 99%)      PHYSICAL EXAM:    General: Well developed; well nourished; in no acute distress  HEENT: MMM, conjunctiva pink and sclera anicteric.  Lungs: clear to auscultation bilaterally.  Cor: Irregular rhythm rate roughly 70 S1, S2 only.  Gastrointestinal: Abdomen: Soft non-tender non-distended; Normal bowel sounds; No hepatosplenomegaly.  Extremities: no cyanosis, clubbing or edema.  Skin: Warm and dry. No obvious rash  Neuro: Pt. a + o x 3.    LABS:  CBC Full  -  ( 09 Dec 2020 10:47 )  WBC Count : 4.65 K/uL  RBC Count : 3.57 M/uL  Hemoglobin : 7.1 g/dL  Hematocrit : 26.0 %  Platelet Count - Automated : 405 K/uL  Mean Cell Volume : 72.8 fl  Mean Cell Hemoglobin : 19.9 pg  Mean Cell Hemoglobin Concentration : 27.3 gm/dL  Auto Neutrophil # : 3.10 K/uL  Auto Lymphocyte # : 0.87 K/uL  Auto Monocyte # : 0.49 K/uL  Auto Eosinophil # : 0.14 K/uL  Auto Basophil # : 0.04 K/uL  Auto Neutrophil % : 66.7 %  Auto Lymphocyte % : 18.7 %  Auto Monocyte % : 10.5 %  Auto Eosinophil % : 3.0 %  Auto Basophil % : 0.9 %    12-09    137  |  106  |  13.0  ----------------------------<  81  4.1   |  23.0  |  0.62    Ca    9.2      09 Dec 2020 10:47    TPro  7.6  /  Alb  3.9  /  TBili  0.3<L>  /  DBili  x   /  AST  25  /  ALT  8   /  AlkPhos  80  12-09    PT/INR - ( 09 Dec 2020 10:47 )   PT: 13.3 sec;   INR: 1.15 ratio         PTT - ( 09 Dec 2020 10:47 )  PTT:28.0 sec                  RADIOLOGY & ADDITIONAL STUDIES (The following images were personally reviewed):

## 2020-12-10 NOTE — PROGRESS NOTE ADULT - SUBJECTIVE AND OBJECTIVE BOX
JANEY NUNEZ  ----------------------------------------  The patient was seen and evaluated for anemia. Offers no complaints.    Vital Signs Last 24 Hrs  T(C): 36.6 (10 Dec 2020 11:13), Max: 36.8 (09 Dec 2020 17:06)  T(F): 97.8 (10 Dec 2020 11:13), Max: 98.3 (09 Dec 2020 17:06)  HR: 68 (10 Dec 2020 11:13) (64 - 93)  BP: 126/69 (10 Dec 2020 11:13) (110/63 - 153/67)  BP(mean): --  RR: 18 (10 Dec 2020 11:13) (16 - 18)  SpO2: 100% (10 Dec 2020 11:13) (97% - 100%)    PHYSICAL EXAMINATION:  ----------------------------------------  General appearance: No acute distress, Awake, Alert  HEENT: Normocephalic, Atraumatic, Conjunctiva clear, EOMI  Neck: Supple, No JVD, No tenderness  Lungs: Breath sound equal bilaterally, No wheezes, No rales  Cardiovascular: S1S2, Irregular rhythm  Abdomen: Soft, Nontender, Nondistended, No guarding/rebound, Positive bowel sounds  Extremities: No clubbing, No cyanosis, No edema, No calf tenderness  Neuro: Strength equal bilaterally, No tremors  Psychiatric: Appropriate mood, Normal affect    LABORATORY STUDIES:  ----------------------------------------             8.1    4.47  )-----------( 364      ( 10 Dec 2020 07:47 )             28.0     12-10    138  |  104  |  13.0  ----------------------------<  82  3.7   |  24.0  |  0.59    Ca    9.0      10 Dec 2020 07:47    TPro  7.6  /  Alb  3.9  /  TBili  0.3<L>  /  DBili  x   /  AST  25  /  ALT  8   /  AlkPhos  80  12-09    LIVER FUNCTIONS - ( 09 Dec 2020 10:47 )  Alb: 3.9 g/dL / Pro: 7.6 g/dL / ALK PHOS: 80 U/L / ALT: 8 U/L / AST: 25 U/L / GGT: x           PT/INR - ( 09 Dec 2020 10:47 )   PT: 13.3 sec;   INR: 1.15 ratio    PTT - ( 09 Dec 2020 10:47 )  PTT:28.0 sec    MEDICATIONS  (STANDING):  diltiazem    milliGRAM(s) Oral daily  folic acid 1 milliGRAM(s) Oral daily  pantoprazole  Injectable 40 milliGRAM(s) IV Push daily  polyethylene glycol/electrolyte Solution 1000 milliLiter(s) Oral <User Schedule>      ASSESSMENT / PLAN:  ----------------------------------------  88F with a history of atrial fibrillation and hypertension who presented with black stools and found to have anemia on outpatient laboratory studies. Packed red blood cells were transfused with improvement in the anemia.    Acute blood loss anemia - Microcytic with serologies noting iron deficiency. Improved with transfusion of packed red blood cells. Hemodynamically stable. Gastroenterology consultation noted with plans for endoscopic evaluation.    Atrial fibrillation - On diltiazem. Discussed with Cardiology. Long term anticoagulation to be held.    Hypertension - Close blood pressure monitoring.

## 2020-12-10 NOTE — CONSULT NOTE ADULT - SUBJECTIVE AND OBJECTIVE BOX
[facility], THE HEART CENTER                                   [address] [place] [address]                                                      PHONE: [phone]                                                         FAX: [phone]  [email or web address]  ---------------------------------------------------------------------------------------------------------------------------------    Reason for Consult: pre op     HPI:  JANEY NUNEZ is an 88y Female with history of HTN PAF on AC mild to moderate AS normal EF on TTE 2019 who was referred to the hospital by her primary care physician for anemia noted on outpatient laboratory studies. The patient had reported that she had been feeling week and without energy for the past week which led to her visit to her primary care physician. The patient denies any chest pain orthopnea or PND.  Patient with functional activity ~ 4 to 6 METS     PAST MEDICAL & SURGICAL HISTORY:  Afib  controlled with Diltiazem    Osteoarthritis    Hypertension    H/O bladder repair surgery  2007    H/O total knee replacement, bilateral    No Known Allergies    MEDICATIONS  (STANDING):  diltiazem    milliGRAM(s) Oral daily  folic acid 1 milliGRAM(s) Oral daily  pantoprazole  Injectable 40 milliGRAM(s) IV Push daily  polyethylene glycol/electrolyte Solution 1000 milliLiter(s) Oral <User Schedule>    MEDICATIONS  (PRN):      Social History:  Cigarettes:          none           Alchohol:       none           Illicit Drug Abuse:  none     ROS: Negative other than as mentioned in HPI.    Vital Signs Last 24 Hrs  T(C): 36.7 (10 Dec 2020 07:18), Max: 37 (09 Dec 2020 09:57)  T(F): 98.1 (10 Dec 2020 07:18), Max: 98.6 (09 Dec 2020 09:57)  HR: 64 (10 Dec 2020 07:18) (64 - 93)  BP: 110/63 (10 Dec 2020 07:18) (110/63 - 153/67)  BP(mean): --  RR: 18 (10 Dec 2020 07:18) (16 - 20)  SpO2: 100% (10 Dec 2020 07:18) (97% - 100%)  ICU Vital Signs Last 24 Hrs  JANEY NUNEZ  I&O's Detail    I&O's Summary    Drug Dosing Weight  JANEY NUNEZ      PHYSICAL EXAM:  General: Appears well developed, well nourished alert and cooperative.  HEENT: Head; normocephalic, atraumatic.  Eyes: Pupils reactive, cornea wnl.  Neck: Supple, no nodes adenopathy, no NVD or carotid bruit or thyromegaly.  CARDIOVASCULAR: Normal S1 and S2, 3/6 murmur, rub, gallop or lift.   LUNGS: No rales, rhonchi or wheeze. Normal breath sounds bilaterally.  ABDOMEN: Soft, nontender without mass or organomegaly. bowel sounds normoactive.  EXTREMITIES: No clubbing, cyanosis or edema. Distal pulses wnl.   SKIN: warm and dry with normal turgor.  NEURO: Alert/oriented x 3/normal motor exam. No pathologic reflexes.    PSYCH: normal affect.        LABS:                        8.1    4.47  )-----------( 364      ( 10 Dec 2020 07:47 )             28.0     12-09    137  |  106  |  13.0  ----------------------------<  81  4.1   |  23.0  |  0.62    Ca    9.2      09 Dec 2020 10:47    TPro  7.6  /  Alb  3.9  /  TBili  0.3<L>  /  DBili  x   /  AST  25  /  ALT  8   /  AlkPhos  80  12-09    JANEY NUNEZ      PT/INR - ( 09 Dec 2020 10:47 )   PT: 13.3 sec;   INR: 1.15 ratio         PTT - ( 09 Dec 2020 10:47 )  PTT:28.0 c      RADIOLOGY & ADDITIONAL STUDIES:    INTERPRETATION OF TELEMETRY (personally reviewed):    ECG: NSR     ECHO: 2019 mild to moderate AS       Assessment and Plan:  In summary, JANEY NUNEZ is an 88y Female with past medical history significant for HTN PAF on AC mild to moderate AS normal EF on TTE 2019 who was referred to the hospital by her primary care physician for anemia noted on outpatient laboratory studies. The patient had reported that she had been feeling week and without energy for the past week which led to her visit to her primary care physician. The patient denies any chest pain orthopnea or PND.  Patient with functional activity ~ 4 to 6 METS.  No evidence heart failure and EKG NSR. HD stable     Patient awaiting low risk GI procedure  due to anemia and ? GIB     Agree with Holding long term AC     Check TTE to re-evaluate LV EF and AS severity     Cardiac clearness pending TTE findings

## 2020-12-11 ENCOUNTER — TRANSCRIPTION ENCOUNTER (OUTPATIENT)
Age: 85
End: 2020-12-11

## 2020-12-11 VITALS
RESPIRATION RATE: 18 BRPM | HEART RATE: 68 BPM | DIASTOLIC BLOOD PRESSURE: 66 MMHG | SYSTOLIC BLOOD PRESSURE: 128 MMHG | TEMPERATURE: 99 F | OXYGEN SATURATION: 100 %

## 2020-12-11 DIAGNOSIS — D50.8 OTHER IRON DEFICIENCY ANEMIAS: ICD-10-CM

## 2020-12-11 LAB
ANION GAP SERPL CALC-SCNC: 10 MMOL/L — SIGNIFICANT CHANGE UP (ref 5–17)
BASOPHILS # BLD AUTO: 0.07 K/UL — SIGNIFICANT CHANGE UP (ref 0–0.2)
BASOPHILS NFR BLD AUTO: 1.1 % — SIGNIFICANT CHANGE UP (ref 0–2)
BUN SERPL-MCNC: 19 MG/DL — SIGNIFICANT CHANGE UP (ref 8–20)
CALCIUM SERPL-MCNC: 9.3 MG/DL — SIGNIFICANT CHANGE UP (ref 8.6–10.2)
CHLORIDE SERPL-SCNC: 109 MMOL/L — HIGH (ref 98–107)
CO2 SERPL-SCNC: 25 MMOL/L — SIGNIFICANT CHANGE UP (ref 22–29)
CREAT SERPL-MCNC: 0.5 MG/DL — SIGNIFICANT CHANGE UP (ref 0.5–1.3)
EOSINOPHIL # BLD AUTO: 0.07 K/UL — SIGNIFICANT CHANGE UP (ref 0–0.5)
EOSINOPHIL NFR BLD AUTO: 1.1 % — SIGNIFICANT CHANGE UP (ref 0–6)
GLUCOSE SERPL-MCNC: 103 MG/DL — HIGH (ref 70–99)
HCT VFR BLD CALC: 30.2 % — LOW (ref 34.5–45)
HGB BLD-MCNC: 8.5 G/DL — LOW (ref 11.5–15.5)
IMM GRANULOCYTES NFR BLD AUTO: 0.3 % — SIGNIFICANT CHANGE UP (ref 0–1.5)
INR BLD: 1.06 RATIO — SIGNIFICANT CHANGE UP (ref 0.88–1.16)
LYMPHOCYTES # BLD AUTO: 1.12 K/UL — SIGNIFICANT CHANGE UP (ref 1–3.3)
LYMPHOCYTES # BLD AUTO: 18.3 % — SIGNIFICANT CHANGE UP (ref 13–44)
MCHC RBC-ENTMCNC: 21.4 PG — LOW (ref 27–34)
MCHC RBC-ENTMCNC: 28.1 GM/DL — LOW (ref 32–36)
MCV RBC AUTO: 75.9 FL — LOW (ref 80–100)
MONOCYTES # BLD AUTO: 0.53 K/UL — SIGNIFICANT CHANGE UP (ref 0–0.9)
MONOCYTES NFR BLD AUTO: 8.7 % — SIGNIFICANT CHANGE UP (ref 2–14)
NEUTROPHILS # BLD AUTO: 4.3 K/UL — SIGNIFICANT CHANGE UP (ref 1.8–7.4)
NEUTROPHILS NFR BLD AUTO: 70.5 % — SIGNIFICANT CHANGE UP (ref 43–77)
PLATELET # BLD AUTO: 394 K/UL — SIGNIFICANT CHANGE UP (ref 150–400)
POTASSIUM SERPL-MCNC: 4.1 MMOL/L — SIGNIFICANT CHANGE UP (ref 3.5–5.3)
POTASSIUM SERPL-SCNC: 4.1 MMOL/L — SIGNIFICANT CHANGE UP (ref 3.5–5.3)
PROTHROM AB SERPL-ACNC: 12.3 SEC — SIGNIFICANT CHANGE UP (ref 10.6–13.6)
RBC # BLD: 3.98 M/UL — SIGNIFICANT CHANGE UP (ref 3.8–5.2)
RBC # FLD: 18.7 % — HIGH (ref 10.3–14.5)
SODIUM SERPL-SCNC: 144 MMOL/L — SIGNIFICANT CHANGE UP (ref 135–145)
WBC # BLD: 6.11 K/UL — SIGNIFICANT CHANGE UP (ref 3.8–10.5)
WBC # FLD AUTO: 6.11 K/UL — SIGNIFICANT CHANGE UP (ref 3.8–10.5)

## 2020-12-11 PROCEDURE — 86923 COMPATIBILITY TEST ELECTRIC: CPT

## 2020-12-11 PROCEDURE — U0003: CPT

## 2020-12-11 PROCEDURE — 36415 COLL VENOUS BLD VENIPUNCTURE: CPT

## 2020-12-11 PROCEDURE — 80048 BASIC METABOLIC PNL TOTAL CA: CPT

## 2020-12-11 PROCEDURE — 83550 IRON BINDING TEST: CPT

## 2020-12-11 PROCEDURE — 86850 RBC ANTIBODY SCREEN: CPT

## 2020-12-11 PROCEDURE — C8929: CPT

## 2020-12-11 PROCEDURE — 82728 ASSAY OF FERRITIN: CPT

## 2020-12-11 PROCEDURE — 83540 ASSAY OF IRON: CPT

## 2020-12-11 PROCEDURE — 85027 COMPLETE CBC AUTOMATED: CPT

## 2020-12-11 PROCEDURE — 82607 VITAMIN B-12: CPT

## 2020-12-11 PROCEDURE — 99239 HOSP IP/OBS DSCHRG MGMT >30: CPT

## 2020-12-11 PROCEDURE — 93005 ELECTROCARDIOGRAM TRACING: CPT

## 2020-12-11 PROCEDURE — 85025 COMPLETE CBC W/AUTO DIFF WBC: CPT

## 2020-12-11 PROCEDURE — 85730 THROMBOPLASTIN TIME PARTIAL: CPT

## 2020-12-11 PROCEDURE — 82746 ASSAY OF FOLIC ACID SERUM: CPT

## 2020-12-11 PROCEDURE — 82272 OCCULT BLD FECES 1-3 TESTS: CPT

## 2020-12-11 PROCEDURE — 80053 COMPREHEN METABOLIC PANEL: CPT

## 2020-12-11 PROCEDURE — 96374 THER/PROPH/DIAG INJ IV PUSH: CPT

## 2020-12-11 PROCEDURE — P9016: CPT

## 2020-12-11 PROCEDURE — 84466 ASSAY OF TRANSFERRIN: CPT

## 2020-12-11 PROCEDURE — 86900 BLOOD TYPING SEROLOGIC ABO: CPT

## 2020-12-11 PROCEDURE — 85610 PROTHROMBIN TIME: CPT

## 2020-12-11 PROCEDURE — 36430 TRANSFUSION BLD/BLD COMPNT: CPT

## 2020-12-11 PROCEDURE — 99285 EMERGENCY DEPT VISIT HI MDM: CPT | Mod: 25

## 2020-12-11 PROCEDURE — 86901 BLOOD TYPING SEROLOGIC RH(D): CPT

## 2020-12-11 PROCEDURE — 99232 SBSQ HOSP IP/OBS MODERATE 35: CPT

## 2020-12-11 RX ORDER — FERROUS SULFATE 325(65) MG
1 TABLET ORAL
Qty: 30 | Refills: 0
Start: 2020-12-11 | End: 2021-01-09

## 2020-12-11 RX ORDER — PANTOPRAZOLE SODIUM 20 MG/1
1 TABLET, DELAYED RELEASE ORAL
Qty: 0 | Refills: 0 | DISCHARGE
Start: 2020-12-11

## 2020-12-11 RX ORDER — PANTOPRAZOLE SODIUM 20 MG/1
1 TABLET, DELAYED RELEASE ORAL
Qty: 30 | Refills: 2
Start: 2020-12-11 | End: 2021-03-10

## 2020-12-11 RX ORDER — PANTOPRAZOLE SODIUM 20 MG/1
40 TABLET, DELAYED RELEASE ORAL
Refills: 0 | Status: DISCONTINUED | OUTPATIENT
Start: 2020-12-11 | End: 2020-12-11

## 2020-12-11 RX ORDER — PANTOPRAZOLE SODIUM 20 MG/1
40 TABLET, DELAYED RELEASE ORAL
Qty: 1200 | Refills: 0
Start: 2020-12-11 | End: 2021-01-09

## 2020-12-11 RX ADMIN — Medication 1 MILLIGRAM(S): at 11:52

## 2020-12-11 RX ADMIN — Medication 180 MILLIGRAM(S): at 06:17

## 2020-12-11 RX ADMIN — PANTOPRAZOLE SODIUM 40 MILLIGRAM(S): 20 TABLET, DELAYED RELEASE ORAL at 11:52

## 2020-12-11 NOTE — CHART NOTE - NSCHARTNOTEFT_GEN_A_CORE
Pt. started bowel prep for her EGD/colonoscopy in a.m.; after starting it, she refused stating she cannot tolerate it and refused to complete the prep.  Dr. Apodaca on call for group, made aware of this.

## 2020-12-11 NOTE — PROGRESS NOTE ADULT - SUBJECTIVE AND OBJECTIVE BOX
[facility], THE [facility]ART [facility]                                   [address] [place] [address]                                                      PHONE: [phone]                                                         FAX: [phone]  [email or web address]  ---------------------------------------------------------------------------------------------------------------------------------    Overnight events/patient complaints:  NAD refusing GI procedure and h/h stable     No Known Allergies    MEDICATIONS  (STANDING):  diltiazem    milliGRAM(s) Oral daily  folic acid 1 milliGRAM(s) Oral daily  pantoprazole  Injectable 40 milliGRAM(s) IV Push daily    MEDICATIONS  (PRN):      Vital Signs Last 24 Hrs  T(C): 36.6 (11 Dec 2020 08:25), Max: 36.8 (10 Dec 2020 18:59)  T(F): 97.8 (11 Dec 2020 08:25), Max: 98.2 (10 Dec 2020 18:59)  HR: 87 (11 Dec 2020 08:25) (68 - 87)  BP: 148/83 (11 Dec 2020 08:25) (126/69 - 156/83)  BP(mean): --  RR: 18 (11 Dec 2020 08:25) (18 - 20)  SpO2: 99% (11 Dec 2020 08:25) (94% - 100%)  ICU Vital Signs Last 24 Hrs  JANEY NUNEZ  I&O's Detail    10 Dec 2020 07:01  -  11 Dec 2020 07:00  --------------------------------------------------------  IN:  Total IN: 0 mL    OUT:    Estimated Blood Loss (mL): 1 mL  Total OUT: 1 mL    Total NET: -1 mL        I&O's Summary    10 Dec 2020 07:01  -  11 Dec 2020 07:00  --------------------------------------------------------  IN: 0 mL / OUT: 1 mL / NET: -1 mL      Drug Dosing Weight  FLADRYAN MAYRA      PHYSICAL EXAM:  General: Appears well developed, well nourished alert and cooperative.  HEENT: Head; normocephalic, atraumatic.  Eyes: Pupils reactive, cornea wnl.  Neck: Supple, no nodes adenopathy, no NVD or carotid bruit or thyromegaly.  CARDIOVASCULAR: Normal S1 and S2, 3/6 mid peaking AS murmur, rub, gallop or lift.   LUNGS: No rales, rhonchi or wheeze. Normal breath sounds bilaterally.  ABDOMEN: Soft, nontender without mass or organomegaly. bowel sounds normoactive.  EXTREMITIES: No clubbing, cyanosis or edema. Distal pulses wnl.   SKIN: warm and dry with normal turgor.  NEURO: Alert/oriented x 3/normal motor exam. No pathologic reflexes.    PSYCH: normal affect.        LABS:                        8.5    6.11  )-----------( 394      ( 11 Dec 2020 06:41 )             30.2     12-11    144  |  109<H>  |  19.0  ----------------------------<  103<H>  4.1   |  25.0  |  0.50    Ca    9.3      11 Dec 2020 06:41    TPro  7.6  /  Alb  3.9  /  TBili  0.3<L>  /  DBili  x   /  AST  25  /  ALT  8   /  AlkPhos  80  12-09    JANEY NUNEZ      PT/INR - ( 11 Dec 2020 06:41 )   PT: 12.3 sec;   INR: 1.06 ratio         PTT - ( 09 Dec 2020 10:47 )  PTT:28.0 sec      RADIOLOGY & ADDITIONAL STUDIES:    INTERPRETATION OF TELEMETRY (personally reviewed):    ECG: NSR     ECHO: 2019 mild to moderate AS       Summary:   1. Severely enlarged left atrium.   2. Left ventricular ejection fraction, by visual estimation,is 60 to 65%.   3. Mildly enlarged right atrium.   4. Normal right ventricular size and function.   5. Mild mitral valve regurgitation.   6. Moderate aortic valve stenosis. Valve is not well visualized. On 2D appears severe. No significant gradients obtained. Repeat study with pedoff probe to obtain gradients or recommend DOTTIE if clinically indicated.   7. Mild tricuspid regurgitation.   8. There is no evidence of pericardial effusion.    MD Jose, RPVI Electronically signed on 12/10/2020 at 2:20:03 PM        Assessment and Plan:  In summary, JANEY NUNEZ is an 88y Female with past medical history significant for HTN PAF on AC mild to moderate AS normal EF on TTE 2019 who was referred to the hospital by her primary care physician for anemia noted on outpatient laboratory studies. The patient had reported that she had been feeling week and without energy for the past week which led to her visit to her primary care physician. The patient denies any chest pain orthopnea or PND.  Patient with functional activity ~ 4 to 6 METS.  No evidence heart failure and EKG NSR. H/H stable and TTE normal EF moderate AS see above     Patient refusing GI work up     Agree with Holding long term AC due to bleeding and fall risk

## 2020-12-11 NOTE — PROVIDER CONTACT NOTE (OTHER) - RECOMMENDATIONS
As per pt she is requesting for AM colonoscopy to be cancelled. PA made aware. Pt is upset of frequent large BM in bathroom and c/o abd cramping due to bowel prep.

## 2020-12-11 NOTE — PROGRESS NOTE ADULT - SUBJECTIVE AND OBJECTIVE BOX
JANEY PAGEHL  ----------------------------------------  The patient was seen and evaluated for anemia. Offers no complaints. Denied dyspnea or chest pain.    Vital Signs Last 24 Hrs  T(C): 36.6 (11 Dec 2020 08:25), Max: 36.8 (10 Dec 2020 18:59)  T(F): 97.8 (11 Dec 2020 08:25), Max: 98.2 (10 Dec 2020 18:59)  HR: 87 (11 Dec 2020 08:25) (68 - 87)  BP: 148/83 (11 Dec 2020 08:25) (126/69 - 156/83)  BP(mean): --  RR: 18 (11 Dec 2020 08:25) (18 - 20)  SpO2: 99% (11 Dec 2020 08:25) (94% - 100%)    PHYSICAL EXAMINATION:  ----------------------------------------  General appearance: No acute distress, Awake, Alert  HEENT: Normocephalic, Atraumatic, Conjunctiva clear, EOMI  Neck: Supple, No JVD, No tenderness  Lungs: Breath sound equal bilaterally, No wheezes, No rales  Cardiovascular: S1S2, Irregular rhythm  Abdomen: Soft, Nontender, Nondistended, No guarding/rebound, Positive bowel sounds  Extremities: No clubbing, No cyanosis, No edema, No calf tenderness  Neuro: Strength equal bilaterally, No tremors  Psychiatric: Appropriate mood, Normal affect    LABORATORY STUDIES:  ----------------------------------------             8.5    6.11  )-----------( 394      ( 11 Dec 2020 06:41 )             30.2     12-11    144  |  109<H>  |  19.0  ----------------------------<  103<H>  4.1   |  25.0  |  0.50    Ca    9.3      11 Dec 2020 06:41    PT/INR - ( 11 Dec 2020 06:41 )   PT: 12.3 sec;   INR: 1.06 ratio      MEDICATIONS  (STANDING):  diltiazem    milliGRAM(s) Oral daily  folic acid 1 milliGRAM(s) Oral daily  pantoprazole  Injectable 40 milliGRAM(s) IV Push daily      ASSESSMENT / PLAN:  ----------------------------------------  88F with a history of atrial fibrillation and hypertension who presented with black stools and found to have anemia on outpatient laboratory studies. Packed red blood cells were transfused with improvement in the anemia.    Acute blood loss anemia - Microcytic with serologies noting iron deficiency, suspected gastrointestinal hemorrhage. Blood count improved with transfusion of packed red blood cells. Hemodynamically stable. Was planned for upper endoscopy and colonoscopy but the patient was unable to tolerate preparation for the procedure. The patient was seen by Gastroenterology but did not wish to pursue the procedures.    Atrial fibrillation - On diltiazem. Cardiology follow up noted. Long term anticoagulation to be held.    Hypertension - Close blood pressure monitoring.

## 2020-12-11 NOTE — DISCHARGE NOTE PROVIDER - PROVIDER TOKENS
PROVIDER:[TOKEN:[[id number]]],PROVIDER:[TOKEN:[[id number]]],PROVIDER:[TOKEN:[[id number]]]

## 2020-12-11 NOTE — PROGRESS NOTE ADULT - SUBJECTIVE AND OBJECTIVE BOX
Chief Complaint: This is a 88y old woman patient being seen in follow-up consultation for SHAHIDA.    HPI / 24H events: Patient seeing and evaluated at bedside, upset, tearful, stating "I had enough, I do not want to have anything done. As per nurse patient started prep, but then had several BM's, abdomina cramping, and vomited, patient refused to continue with prep. This AM patient refusion also EGD. Hemoglobin 8.5. Denies nausea, vomiting, abdominal pain, chest pain, shortness of breath, hematemesis, hematochezia, melena.  ROS: A 14-point review of systems was reviewed and was otherwise negative save what was reported in the HPI.    PAST MEDICAL/SURGICAL HISTORY:  Afib  controlled with Diltiazem    Osteoarthritis    Hypertension    H/O bladder repair surgery  2007    H/O total knee replacement, bilateral      MEDICATIONS  (STANDING):  diltiazem    milliGRAM(s) Oral daily  folic acid 1 milliGRAM(s) Oral daily  pantoprazole  Injectable 40 milliGRAM(s) IV Push daily    MEDICATIONS  (PRN):    No Known Allergies    T(C): 36.6 (12-11-20 @ 08:25), Max: 36.8 (12-10-20 @ 18:59)  HR: 87 (12-11-20 @ 08:25) (68 - 87)  BP: 148/83 (12-11-20 @ 08:25) (126/69 - 156/83)  RR: 18 (12-11-20 @ 08:25) (18 - 20)  SpO2: 99% (12-11-20 @ 08:25) (94% - 100%)    I&O's Summary    10 Dec 2020 07:01  -  11 Dec 2020 07:00  --------------------------------------------------------  IN: 0 mL / OUT: 1 mL / NET: -1 mL      PHYSICAL EXAM:  Constitutional: Elderly looking, upset, tearful,  in no apparent distress  Eyes: Sclerae anicteric, conjunctivae normal  ENMT: Mucus membranes moist, no oropharyngeal thrush noted  Respiratory: Breathing nonlabored; clear to auscultation  Cardiovascular: Regular rate and rhythm  Gastrointestinal: Soft, nontender, nondistended, normoactive bowel sounds.  Extremities: No clubbing, cyanosis or edema  Neurological: Alert and oriented to person, place and time; no asterixis  Skin: No jaundice  Musculoskeletal: No significant peripheral atrophy  Psychiatric: Affect and mood appropriate                   8.5    6.11  )-----------( 394      ( 12-11 @ 06:41 )             30.2                8.1    4.47  )-----------( 364      ( 12-10 @ 07:47 )             28.0                7.1    4.65  )-----------( 405      ( 12-09 @ 10:47 )             26.0       12-11    144  |  109<H>  |  19.0  ----------------------------<  103<H>  4.1   |  25.0  |  0.50    Ca    9.3      11 Dec 2020 06:41    TPro  7.6  /  Alb  3.9  /  TBili  0.3<L>  /  DBili  x   /  AST  25  /  ALT  8   /  AlkPhos  80  12-09    LIVER FUNCTIONS - ( 09 Dec 2020 10:47 )  Alb: 3.9 g/dL / Pro: 7.6 g/dL / ALK PHOS: 80 U/L / ALT: 8 U/L / AST: 25 U/L / GGT: x           PT/INR - ( 11 Dec 2020 06:41 )   PT: 12.3 sec;   INR: 1.06 ratio         PTT - ( 09 Dec 2020 10:47 )  PTT:28.0 sec    IMAGING: I personally reviewed the X-Ray of lumbar spine, and I agree with the radiologist's interpretation as described below:  IMPRESSION:  No compression fractures.    Redemonstrated multilevel degenerative disease manifest by varying   degrees of disc space narrowing with disc margin osteophyte formation and   posterior facet arthrosis.    Redemonstrated grade 1 anterolisthesis of L4 on L5 however without   associated spondylolysis defects. Remaining vertebral body alignment   maintained.    Unremarkable SI joints and partially visualized hips.    Generalized osteopenia otherwise no discrete lytic or blastic lesions.

## 2020-12-11 NOTE — DISCHARGE NOTE NURSING/CASE MANAGEMENT/SOCIAL WORK - PATIENT PORTAL LINK FT
You can access the FollowMyHealth Patient Portal offered by [facility] by registering at the following website: [email or web address]. By joining [employer]’s FollowMyHealth portal, you will also be able to view your health information using other applications (apps) compatible with our system.

## 2020-12-11 NOTE — DISCHARGE NOTE PROVIDER - CARE PROVIDER_API CALL
Trevor Ortega J  INTERNAL MEDICINE  [address], [place] [address]  Phone: [phone]  Fax: [phone]  Follow Up Time:     Alvarado Apodaca  GASTROENTEROLOGY  [address], [address]  [place] [address]  Phone: [phone]  Fax: [phone]  Follow Up Time:     Dell Marion  CARDIOLOGY  [address] [place] [address]  Phone: [phone]  Fax: [phone]  Follow Up Time:

## 2020-12-11 NOTE — DISCHARGE NOTE PROVIDER - NSDCMRMEDTOKEN_GEN_ALL_CORE_FT
ascorbic acid 500 mg oral tablet: 1 tab(s) orally once a day  aspirin 81 mg oral delayed release tablet: 1 tab(s) orally once a day  dilTIAZem 180 mg/24 hours oral capsule, extended release: 1 cap(s) orally once a day  ferrous sulfate 324 mg (65 mg elemental iron) oral delayed release tablet: 1 tab(s) orally once a day   Multiple Vitamins oral tablet: 1 tab(s) orally once a day  pantoprazole 40 mg intravenous injection: 40 milligram(s) intravenous once a day  senna oral tablet: 2 tab(s) orally once a day (at bedtime), As needed, Constipation

## 2020-12-11 NOTE — PROVIDER CONTACT NOTE (OTHER) - SITUATION
GABINO Howard on unit doing rounds. Informed that pt is c/o frequent BM due to bowel prep. Pt frustrated and upset. Prep started at 9pm when pt arrived to unit. Pt c/o how much MoviPrep she has to consume.

## 2020-12-11 NOTE — DISCHARGE NOTE PROVIDER - CARE PROVIDERS DIRECT ADDRESSES
[email or web address]

## 2020-12-11 NOTE — DISCHARGE NOTE PROVIDER - NSDCCPCAREPLAN_GEN_ALL_CORE_FT
PRINCIPAL DISCHARGE DIAGNOSIS  Diagnosis: GI bleed  Assessment and Plan of Treatment: Continue on pantoprazole. Follow up with Gastroenterology for further management. Continue on iron supplements.      SECONDARY DISCHARGE DIAGNOSES  Diagnosis: Atrial fibrillation  Assessment and Plan of Treatment: Apixaban was discontinued. Follow up with your cardiologist  for further management.

## 2020-12-11 NOTE — PROGRESS NOTE ADULT - PROBLEM SELECTOR PLAN 1
Microcytic with serologies noting iron deficiency Improved post PRBC's transfusion. Patient refusing endoscopic procedures.  Keep Hb at 8 grams or higher.  Reassumed  Clear liquid diet.  Continue Pantoprazole for mucosal cytoprotection.  Reassumed Eliquis if require by Cardiology. Microcytic with serologies noting iron deficiency Improved post PRBC's transfusion. Patient refusing endoscopic procedures.  Keep Hb at 8 grams or higher.  Reassumed  Clear liquid diet.  Continue Pantoprazole for mucosal cytoprotection.  Given Iron deficiency anemia and potential abnormal Ascending colon; pt is at high risk for rebleeding and possible progression of disease.  Would avoid restart of Anticoagulation if possible.  GI follow up post discharge to re-approach issue of SHAHIDA and GI procedures.

## 2020-12-11 NOTE — DISCHARGE NOTE PROVIDER - HOSPITAL COURSE
88F with weakness and an episode of dark stool referred to the hospital by her primary care physician with outpatient laboratory studies noting anemia. On presentation, H/H(7.1/26). The patient required transfusion of packed red blood cells for anemia. Pantoprazole was initiated for cytoprotection. The patient was seen by Gastroenterology in consultation and thought to benefit from endoscopic evaluation. Iron panel was indicative of iron deficiency anemia. The patient was seen by Cardiology in consultation for perioperative evaluation. Echocardiogram noted normal global left ventricular systolic function, ejection fraction of 60 to 65%, severely enlarged left atrium, moderate aortic stenosis. The patient was unable to tolerate preparation for the endoscopy/colonoscopy. She was seen by Gastroenterology and she did not wish to pursue the procedures. The patient was discharged home with instructions to follow up with her primary care physician and Gastroenterology for further management.      35 minutes total time

## 2020-12-12 ENCOUNTER — NON-APPOINTMENT (OUTPATIENT)
Age: 85
End: 2020-12-12

## 2020-12-14 ENCOUNTER — NON-APPOINTMENT (OUTPATIENT)
Age: 85
End: 2020-12-14

## 2020-12-16 ENCOUNTER — APPOINTMENT (OUTPATIENT)
Dept: INTERNAL MEDICINE | Facility: CLINIC | Age: 85
End: 2020-12-16
Payer: MEDICARE

## 2020-12-16 ENCOUNTER — LABORATORY RESULT (OUTPATIENT)
Age: 85
End: 2020-12-16

## 2020-12-16 VITALS
SYSTOLIC BLOOD PRESSURE: 110 MMHG | BODY MASS INDEX: 20.12 KG/M2 | HEART RATE: 62 BPM | TEMPERATURE: 98 F | RESPIRATION RATE: 13 BRPM | DIASTOLIC BLOOD PRESSURE: 70 MMHG | WEIGHT: 110 LBS

## 2020-12-16 DIAGNOSIS — R53.83 OTHER FATIGUE: ICD-10-CM

## 2020-12-16 PROCEDURE — 99495 TRANSJ CARE MGMT MOD F2F 14D: CPT | Mod: 25

## 2020-12-16 PROCEDURE — 36415 COLL VENOUS BLD VENIPUNCTURE: CPT

## 2020-12-16 RX ORDER — APIXABAN 2.5 MG/1
2.5 TABLET, FILM COATED ORAL
Qty: 60 | Refills: 0 | Status: DISCONTINUED | COMMUNITY
Start: 2019-04-15 | End: 2020-12-16

## 2020-12-17 LAB
ALBUMIN SERPL ELPH-MCNC: 4 G/DL
ALP BLD-CCNC: 83 U/L
ALT SERPL-CCNC: 7 U/L
ANION GAP SERPL CALC-SCNC: 11 MMOL/L
AST SERPL-CCNC: 24 U/L
BASOPHILS # BLD AUTO: 0.03 K/UL
BASOPHILS NFR BLD AUTO: 0.5 %
BILIRUB SERPL-MCNC: 0.2 MG/DL
BUN SERPL-MCNC: 15 MG/DL
CALCIUM SERPL-MCNC: 9.3 MG/DL
CHLORIDE SERPL-SCNC: 106 MMOL/L
CO2 SERPL-SCNC: 24 MMOL/L
CREAT SERPL-MCNC: 0.78 MG/DL
EOSINOPHIL # BLD AUTO: 0.11 K/UL
EOSINOPHIL NFR BLD AUTO: 1.9 %
FERRITIN SERPL-MCNC: 26 NG/ML
GLUCOSE SERPL-MCNC: 77 MG/DL
HCT VFR BLD CALC: 30.4 %
HGB BLD-MCNC: 8.2 G/DL
IMM GRANULOCYTES NFR BLD AUTO: 0.2 %
IRON SATN MFR SERPL: 7 %
IRON SERPL-MCNC: 31 UG/DL
LYMPHOCYTES # BLD AUTO: 0.92 K/UL
LYMPHOCYTES NFR BLD AUTO: 15.9 %
MAN DIFF?: NORMAL
MCHC RBC-ENTMCNC: 21.5 PG
MCHC RBC-ENTMCNC: 27 GM/DL
MCV RBC AUTO: 79.8 FL
MONOCYTES # BLD AUTO: 0.46 K/UL
MONOCYTES NFR BLD AUTO: 8 %
NEUTROPHILS # BLD AUTO: 4.24 K/UL
NEUTROPHILS NFR BLD AUTO: 73.5 %
PLATELET # BLD AUTO: 330 K/UL
POTASSIUM SERPL-SCNC: 4.4 MMOL/L
PROT SERPL-MCNC: 7.3 G/DL
RBC # BLD: 3.81 M/UL
RBC # FLD: 22.5 %
SODIUM SERPL-SCNC: 140 MMOL/L
TIBC SERPL-MCNC: 442 UG/DL
UIBC SERPL-MCNC: 411 UG/DL
WBC # FLD AUTO: 5.77 K/UL

## 2020-12-17 NOTE — ASSESSMENT
[FreeTextEntry1] : Venipuncture done in our office, Labs sent out.Pt told to followup with GI/cardio. RTO for reg followup/pending labs\par \par Dr. Ortega was present in office building while I examined patient\par \par \par \par \par \par GI MD=Dr. Apodaca\par Cardio MD=Dr. Lujan

## 2020-12-17 NOTE — HISTORY OF PRESENT ILLNESS
[Post-hospitalization from ___ Hospital] : Post-hospitalization from [unfilled] Hospital [Admitted on: ___] : The patient was admitted on [unfilled] [Discharged on ___] : discharged on [unfilled] [FreeTextEntry2] : Pt presented to [facility] ED on 12/9/2020 as per my direction. Pt was seen in office on 12/8/2020 secondary to NFW mainly with faitgue, labs showed H/H of 7.7/29.4 with +iron deficiency so pt was sent to ED. Hemoglobin on presentation was 7.1 so the patient was transfused packed blood cells, pantoprazole was given and gastroenterology consult was gotten with planned scoping after cardiac evaluation. The patient had echo done showing normal LVEF w/ severely enlarged left atrium, moderate aortic stenosis. The patient was unable to tolerate prep therefore refused colonoscopy and endoscopy, hemoglobin increased to 8.5 on discharge. Patient was discharged on 12/11/20 with planned outpatient GI followup. Anticoagulation was discontinued.Pt states she feels the same "tired"\par \par \par \par MED CHANGES\par -NEW=PROTONIX\par -HOLD ELIQUIS/on ASA\par -NEW=IRON\par

## 2020-12-17 NOTE — ADDENDUM
[FreeTextEntry1] : Labs show\par -H/H of 8.2/30.4=has apt with GI next week, increase iron to BID, check labs in 2 weeks

## 2020-12-17 NOTE — PHYSICAL EXAM
[No Acute Distress] : no acute distress [No Respiratory Distress] : no respiratory distress  [Clear to Auscultation] : lungs were clear to auscultation bilaterally [Normal Rate] : normal rate  [Regular Rhythm] : with a regular rhythm [Normal Affect] : the affect was normal [Normal Mood] : the mood was normal [de-identified] : +murmur

## 2020-12-23 ENCOUNTER — APPOINTMENT (OUTPATIENT)
Dept: GASTROENTEROLOGY | Facility: CLINIC | Age: 85
End: 2020-12-23

## 2021-01-07 ENCOUNTER — LABORATORY RESULT (OUTPATIENT)
Age: 86
End: 2021-01-07

## 2021-01-07 ENCOUNTER — APPOINTMENT (OUTPATIENT)
Dept: INTERNAL MEDICINE | Facility: CLINIC | Age: 86
End: 2021-01-07
Payer: MEDICARE

## 2021-01-07 VITALS
OXYGEN SATURATION: 96 % | WEIGHT: 120 LBS | BODY MASS INDEX: 22.08 KG/M2 | SYSTOLIC BLOOD PRESSURE: 120 MMHG | HEIGHT: 62 IN | TEMPERATURE: 97.1 F | HEART RATE: 70 BPM | RESPIRATION RATE: 14 BRPM | DIASTOLIC BLOOD PRESSURE: 70 MMHG

## 2021-01-07 DIAGNOSIS — D50.9 IRON DEFICIENCY ANEMIA, UNSPECIFIED: ICD-10-CM

## 2021-01-07 PROCEDURE — 99213 OFFICE O/P EST LOW 20 MIN: CPT | Mod: 25

## 2021-01-07 PROCEDURE — 36415 COLL VENOUS BLD VENIPUNCTURE: CPT

## 2021-01-08 LAB
BASOPHILS # BLD AUTO: 0.04 K/UL
BASOPHILS NFR BLD AUTO: 0.9 %
EOSINOPHIL # BLD AUTO: 0.11 K/UL
EOSINOPHIL NFR BLD AUTO: 2.5 %
FERRITIN SERPL-MCNC: 80 NG/ML
HCT VFR BLD CALC: 37 %
HGB BLD-MCNC: 10.7 G/DL
IMM GRANULOCYTES NFR BLD AUTO: 0.2 %
IRON SATN MFR SERPL: 42 %
IRON SERPL-MCNC: 161 UG/DL
LYMPHOCYTES # BLD AUTO: 0.64 K/UL
LYMPHOCYTES NFR BLD AUTO: 14.7 %
MAN DIFF?: NORMAL
MCHC RBC-ENTMCNC: 27 PG
MCHC RBC-ENTMCNC: 28.9 GM/DL
MCV RBC AUTO: 93.4 FL
MONOCYTES # BLD AUTO: 0.33 K/UL
MONOCYTES NFR BLD AUTO: 7.6 %
NEUTROPHILS # BLD AUTO: 3.22 K/UL
NEUTROPHILS NFR BLD AUTO: 74.1 %
PLATELET # BLD AUTO: 279 K/UL
RBC # BLD: 3.96 M/UL
RBC # FLD: NORMAL
TIBC SERPL-MCNC: 380 UG/DL
UIBC SERPL-MCNC: 219 UG/DL
WBC # FLD AUTO: 4.35 K/UL

## 2021-01-08 NOTE — HISTORY OF PRESENT ILLNESS
[FreeTextEntry1] : Patient presents for followup on hypertension/repeat labs. Patient offers no acute complaints, she is .Patient continues on diltiazem for hypertension And was found to have H&H of 8.2/30.4 with previous blood work, iron was increased to b.i.d.\par -Patient had appointment with GI scheduled for further evaluation of iron deficiency, she "forgot to go"

## 2021-01-08 NOTE — ASSESSMENT
[FreeTextEntry1] : GI FOLLOWUP STRESSED!!!!Venipuncture done in our office, Labs sent out/ further recommendations will be made based on lab results. Patient advised to continue present medications with diet/exercise and specialist followup. Patient will return to the office for CP in near future/pending labs\par \par \par \par declines all vaccines\par Declines mammogram/bone density/colonoscopy or cologuard/GYN exam\par specialists include\par 1. Dermatology prn-Dr. Pemberton\par 2.cardio-Dr. Lujan\par 3. Ophthalmology Q year-cannot recall doctor's name at this time\par 4.GI-Dr. Apodaca\par 5.Hematology-Dr. Alicia-no follow up needed\par 6.Vascular for VV-rev via hematology=declines\par echo 12/2020\par

## 2021-01-08 NOTE — ADDENDUM
[FreeTextEntry1] : Labs show\par -H&H of 10.7/37-to continue iron b.i.d., followup with GI and repeat labs in one month

## 2021-01-15 ENCOUNTER — APPOINTMENT (OUTPATIENT)
Dept: GASTROENTEROLOGY | Facility: CLINIC | Age: 86
End: 2021-01-15
Payer: MEDICARE

## 2021-01-15 VITALS
SYSTOLIC BLOOD PRESSURE: 120 MMHG | TEMPERATURE: 97.7 F | BODY MASS INDEX: 22.45 KG/M2 | DIASTOLIC BLOOD PRESSURE: 71 MMHG | HEIGHT: 62 IN | HEART RATE: 67 BPM | WEIGHT: 122 LBS

## 2021-01-15 DIAGNOSIS — D50.8 OTHER IRON DEFICIENCY ANEMIAS: ICD-10-CM

## 2021-01-15 PROCEDURE — 99214 OFFICE O/P EST MOD 30 MIN: CPT

## 2021-01-15 PROCEDURE — 99204 OFFICE O/P NEW MOD 45 MIN: CPT

## 2021-01-15 PROCEDURE — 82272 OCCULT BLD FECES 1-3 TESTS: CPT

## 2021-01-15 NOTE — ASSESSMENT
[FreeTextEntry1] : A/P\par Patient with FE deficiency anemia, constipation, hx of colon polyps.  family hx of colon cancer\par miralax qd\par \par anemia- need egd and colon to r/o mass, celiac , PUD\par  I discussed the risks and benefits of EGD and patient was given opportunity to ask questions. EGD to r/o Perdue's  esophagus, PUD, mass, AVM'S. Pt is medically optimized for EGD\par \par  I discussed the risks and benefits of colonoscopy and patient was given opportunity to ask questions. Colonoscopy to r/o colon cancer, polyps, AVM's. Patient is medically optimized for the procedure\par - movi prep \par \par will need Cardiac clearance before egd, colon booked

## 2021-01-15 NOTE — PHYSICAL EXAM
[General Appearance - Alert] : alert [FreeTextEntry1] : thin [Sclera] : the sclera and conjunctiva were normal [Outer Ear] : the ears and nose were normal in appearance [Neck Appearance] : the appearance of the neck was normal [Neck Cervical Mass (___cm)] : no neck mass was observed [Respiration, Rhythm And Depth] : normal respiratory rhythm and effort [] : no respiratory distress [Exaggerated Use Of Accessory Muscles For Inspiration] : no accessory muscle use [Auscultation Breath Sounds / Voice Sounds] : lungs were clear to auscultation bilaterally [Apical Impulse] : the apical impulse was normal [Heart Sounds] : normal S1 and S2 [Heart Rate And Rhythm] : heart rate was normal and rhythm regular [Bowel Sounds] : normal bowel sounds [Abdomen Soft] : soft [Abdomen Tenderness] : non-tender [Normal Sphincter Tone] : normal sphincter tone [No Rectal Mass] : no rectal mass [Internal Hemorrhoid] : no internal hemorrhoids [External Hemorrhoid] : no external hemorrhoids [Occult Blood Positive] : stool was negative for occult blood [Femoral Lymph Nodes Enlarged Bilaterally] : femoral [Oriented To Time, Place, And Person] : oriented to person, place, and time [Impaired Insight] : insight and judgment were intact [Affect] : the affect was normal

## 2021-02-08 ENCOUNTER — RX RENEWAL (OUTPATIENT)
Age: 86
End: 2021-02-08

## 2021-03-15 NOTE — PROGRESS NOTE ADULT - PROBLEM SELECTOR PLAN 1
-- DO NOT REPLY / DO NOT REPLY ALL --  -- Message is from the [facility]--    COVID-19 Universal Screening: N/A - Not about scheduling    General Patient Message      Reason for Call: Patient  is returning a missed call regarding Dexa scan results please call and advise     Caller Information       Type Contact Phone    03/15/2021 12:52 PM CDT Phone (Incoming) Mali Marsh (Self) [phone] (M)          Alternative phone number: na    Turnaround time given to caller:   \"This message will be sent to [state Provider's name]. The clinical team will fulfill your request as soon as they review your message.\"     For EGD/ Colon tomorrow. For EGD/ Colon tomorrow. Cardiac clearance needed. Continue to hold Eliquis. Keep Hb at 8 grams or higher. CLD. Repeat labs ordered. Continue Pantoprazole for GI mucosal cytoprotection.

## 2021-08-26 ENCOUNTER — TRANSCRIPTION ENCOUNTER (OUTPATIENT)
Age: 86
End: 2021-08-26

## 2021-08-26 ENCOUNTER — INPATIENT (INPATIENT)
Facility: HOSPITAL | Age: 86
LOS: 5 days | Discharge: HOME | DRG: 480 | End: 2021-09-01
Attending: STUDENT IN AN ORGANIZED HEALTH CARE EDUCATION/TRAINING PROGRAM | Admitting: HOSPITALIST
Payer: MEDICARE

## 2021-08-26 VITALS
SYSTOLIC BLOOD PRESSURE: 159 MMHG | HEIGHT: 65 IN | OXYGEN SATURATION: 97 % | DIASTOLIC BLOOD PRESSURE: 87 MMHG | TEMPERATURE: 98 F | HEART RATE: 90 BPM | WEIGHT: 110.01 LBS | RESPIRATION RATE: 18 BRPM

## 2021-08-26 DIAGNOSIS — Z96.653 PRESENCE OF ARTIFICIAL KNEE JOINT, BILATERAL: Chronic | ICD-10-CM

## 2021-08-26 DIAGNOSIS — Z98.89 OTHER SPECIFIED POSTPROCEDURAL STATES: Chronic | ICD-10-CM

## 2021-08-26 LAB
ALBUMIN SERPL ELPH-MCNC: 4.1 G/DL — SIGNIFICANT CHANGE UP (ref 3.3–5.2)
ALP SERPL-CCNC: 80 U/L — SIGNIFICANT CHANGE UP (ref 40–120)
ALT FLD-CCNC: 11 U/L — SIGNIFICANT CHANGE UP
ANION GAP SERPL CALC-SCNC: 14 MMOL/L — SIGNIFICANT CHANGE UP (ref 5–17)
ANISOCYTOSIS BLD QL: SLIGHT — SIGNIFICANT CHANGE UP
APTT BLD: 25 SEC — LOW (ref 27.5–35.5)
AST SERPL-CCNC: 28 U/L — SIGNIFICANT CHANGE UP
BASOPHILS # BLD AUTO: 0 K/UL — SIGNIFICANT CHANGE UP (ref 0–0.2)
BASOPHILS NFR BLD AUTO: 0 % — SIGNIFICANT CHANGE UP (ref 0–2)
BILIRUB SERPL-MCNC: 0.8 MG/DL — SIGNIFICANT CHANGE UP (ref 0.4–2)
BLD GP AB SCN SERPL QL: SIGNIFICANT CHANGE UP
BUN SERPL-MCNC: 14.2 MG/DL — SIGNIFICANT CHANGE UP (ref 8–20)
CALCIUM SERPL-MCNC: 9.7 MG/DL — SIGNIFICANT CHANGE UP (ref 8.6–10.2)
CHLORIDE SERPL-SCNC: 102 MMOL/L — SIGNIFICANT CHANGE UP (ref 98–107)
CO2 SERPL-SCNC: 25 MMOL/L — SIGNIFICANT CHANGE UP (ref 22–29)
CREAT SERPL-MCNC: 0.64 MG/DL — SIGNIFICANT CHANGE UP (ref 0.5–1.3)
ELLIPTOCYTES BLD QL SMEAR: SLIGHT — SIGNIFICANT CHANGE UP
EOSINOPHIL # BLD AUTO: 0 K/UL — SIGNIFICANT CHANGE UP (ref 0–0.5)
EOSINOPHIL NFR BLD AUTO: 0 % — SIGNIFICANT CHANGE UP (ref 0–6)
GIANT PLATELETS BLD QL SMEAR: PRESENT — SIGNIFICANT CHANGE UP
GLUCOSE SERPL-MCNC: 150 MG/DL — HIGH (ref 70–99)
HCT VFR BLD CALC: 40.4 % — SIGNIFICANT CHANGE UP (ref 34.5–45)
HGB BLD-MCNC: 13.3 G/DL — SIGNIFICANT CHANGE UP (ref 11.5–15.5)
INR BLD: 0.97 RATIO — SIGNIFICANT CHANGE UP (ref 0.88–1.16)
LYMPHOCYTES # BLD AUTO: 0.11 K/UL — LOW (ref 1–3.3)
LYMPHOCYTES # BLD AUTO: 0.9 % — LOW (ref 13–44)
MACROCYTES BLD QL: SLIGHT — SIGNIFICANT CHANGE UP
MANUAL SMEAR VERIFICATION: SIGNIFICANT CHANGE UP
MCHC RBC-ENTMCNC: 32.1 PG — SIGNIFICANT CHANGE UP (ref 27–34)
MCHC RBC-ENTMCNC: 32.9 GM/DL — SIGNIFICANT CHANGE UP (ref 32–36)
MCV RBC AUTO: 97.6 FL — SIGNIFICANT CHANGE UP (ref 80–100)
MICROCYTES BLD QL: SLIGHT — SIGNIFICANT CHANGE UP
MONOCYTES # BLD AUTO: 0.3 K/UL — SIGNIFICANT CHANGE UP (ref 0–0.9)
MONOCYTES NFR BLD AUTO: 2.6 % — SIGNIFICANT CHANGE UP (ref 2–14)
NEUTROPHILS # BLD AUTO: 11.27 K/UL — HIGH (ref 1.8–7.4)
NEUTROPHILS NFR BLD AUTO: 96.5 % — HIGH (ref 43–77)
OVALOCYTES BLD QL SMEAR: SLIGHT — SIGNIFICANT CHANGE UP
PLAT MORPH BLD: NORMAL — SIGNIFICANT CHANGE UP
PLATELET # BLD AUTO: 218 K/UL — SIGNIFICANT CHANGE UP (ref 150–400)
POIKILOCYTOSIS BLD QL AUTO: SLIGHT — SIGNIFICANT CHANGE UP
POTASSIUM SERPL-MCNC: 4 MMOL/L — SIGNIFICANT CHANGE UP (ref 3.5–5.3)
POTASSIUM SERPL-SCNC: 4 MMOL/L — SIGNIFICANT CHANGE UP (ref 3.5–5.3)
PROT SERPL-MCNC: 7.9 G/DL — SIGNIFICANT CHANGE UP (ref 6.6–8.7)
PROTHROM AB SERPL-ACNC: 11.3 SEC — SIGNIFICANT CHANGE UP (ref 10.6–13.6)
RBC # BLD: 4.14 M/UL — SIGNIFICANT CHANGE UP (ref 3.8–5.2)
RBC # FLD: 13.7 % — SIGNIFICANT CHANGE UP (ref 10.3–14.5)
RBC BLD AUTO: SIGNIFICANT CHANGE UP
SODIUM SERPL-SCNC: 140 MMOL/L — SIGNIFICANT CHANGE UP (ref 135–145)
WBC # BLD: 11.68 K/UL — HIGH (ref 3.8–10.5)
WBC # FLD AUTO: 11.68 K/UL — HIGH (ref 3.8–10.5)

## 2021-08-26 PROCEDURE — 71045 X-RAY EXAM CHEST 1 VIEW: CPT | Mod: 26

## 2021-08-26 PROCEDURE — 73552 X-RAY EXAM OF FEMUR 2/>: CPT | Mod: 26,RT

## 2021-08-26 PROCEDURE — 70450 CT HEAD/BRAIN W/O DYE: CPT | Mod: 26,MH

## 2021-08-26 PROCEDURE — 99285 EMERGENCY DEPT VISIT HI MDM: CPT

## 2021-08-26 PROCEDURE — 73502 X-RAY EXAM HIP UNI 2-3 VIEWS: CPT | Mod: 26,RT

## 2021-08-26 PROCEDURE — 72125 CT NECK SPINE W/O DYE: CPT | Mod: 26,MH

## 2021-08-26 RX ORDER — CEFAZOLIN SODIUM 1 G
2000 VIAL (EA) INJECTION ONCE
Refills: 0 | Status: DISCONTINUED | OUTPATIENT
Start: 2021-08-27 | End: 2021-08-27

## 2021-08-26 RX ORDER — ACETAMINOPHEN 500 MG
1000 TABLET ORAL ONCE
Refills: 0 | Status: COMPLETED | OUTPATIENT
Start: 2021-08-26 | End: 2021-08-26

## 2021-08-26 RX ADMIN — Medication 400 MILLIGRAM(S): at 20:06

## 2021-08-26 RX ADMIN — Medication 1000 MILLIGRAM(S): at 22:17
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
## 2021-08-26 NOTE — ED PROVIDER NOTE - CLINICAL SUMMARY MEDICAL DECISION MAKING FREE TEXT BOX
Mechanical fall with right hip fracture, no other trauma identified on exam. Will need operative management by orthopedics. Admission declined by trauma surgery. Incidental right transverse sinus thrombosis on head CT. Neuro intact, neurosurgery consulted, will reassess need for anticoagulation and OR clearance pending attending eval, maintain q4h neuro checks. Will be admitted to medicine

## 2021-08-26 NOTE — CONSULT NOTE ADULT - ASSESSMENT
89yo F w/ PMH of iron-deficiency anemia, osteoarthritis, and HTN who was BIBEMS with complaints of right leg pain, noted with obvious deformity, shortened and externally rotated. Found with right hip fracture. Based on history, in which she denies falling and that her hip gave out this morning with pain beginning yesterday, injury more consistent with insufficiency fracture. No pain elsewhere no other injuries on exam.     #Right Hip Frx  - Does not appear to have had traumatic event, injury consistent with insufficiency fracture.  - Ortho consult  - Cleared from trauma  - Tertiary exam in AM    Patient seen and plan discussed with Dr. Lujan

## 2021-08-26 NOTE — ED PROVIDER NOTE - CARE PLAN
Principal Discharge DX:	Hip fracture   1 Principal Discharge DX:	Hip fracture  Secondary Diagnosis:	Thrombosis of lateral venous sinus

## 2021-08-26 NOTE — ED PROVIDER NOTE - OBJECTIVE STATEMENT
88yof states she was walking to the bathroom last night, felt her right leg give out and she fell to the ground. She denies any other injuries from the fall but was unable to get off the ground. She is unsure of the medicines she takes at home.

## 2021-08-26 NOTE — ED ADULT TRIAGE NOTE - CHIEF COMPLAINT QUOTE
Patient states that she was in the bathroom this morning and her left hip gave out. pt states that she has been on the floor since 10am. Pt has an obvious deformity to her left hip

## 2021-08-26 NOTE — ED ADULT NURSE NOTE - OBJECTIVE STATEMENT
presents with left hip pain w/ deformity s/p fall. Patient fell in the bathroom at 10AM and has not been able to get up since then. On assessment, Left leg is shortened and rotated.

## 2021-08-26 NOTE — ED ADULT NURSE NOTE - NSIMPLEMENTINTERV_GEN_ALL_ED
Implemented All Fall with Harm Risk Interventions:  [place] to call system. Call bell, personal items and telephone within reach. Instruct patient to call for assistance. Room bathroom lighting operational. Non-slip footwear when patient is off stretcher. Physically safe environment: no spills, clutter or unnecessary equipment. Stretcher in lowest position, wheels locked, appropriate side rails in place. Provide visual cue, wrist band, yellow gown, etc. Monitor gait and stability. Monitor for mental status changes and reorient to person, place, and time. Review medications for side effects contributing to fall risk. Reinforce activity limits and safety measures with patient and family. Provide visual clues: red socks.

## 2021-08-26 NOTE — PROGRESS NOTE ADULT - SUBJECTIVE AND OBJECTIVE BOX
Anesthesia Review    88y Female with hip fracture    No Known Allergies      No significant family history    Family hx of colon cancer    MEWS Score    Hypertension    Osteoarthritis    Afib    Afib    Hip fracture    H/O total knee replacement, bilateral    H/O bladder repair surgery    FALL    90+    [email or web address]        HPI:      PAST MEDICAL & SURGICAL HISTORY:  Hypertension    Osteoarthritis    Afib  controlled with Diltiazem    H/O total knee replacement, bilateral    H/O bladder repair surgery  2007        MEDICATIONS  (STANDING):    MEDICATIONS  (PRN):      Allergies    No Known Allergies    Intolerances        SOCIAL HISTORY:    FAMILY HISTORY:  Family hx of colon cancer  sister        Vital Signs Last 24 Hrs  T(C): 36.8 (26 Aug 2021 22:36), Max: 36.9 (26 Aug 2021 17:51)  T(F): 98.2 (26 Aug 2021 22:36), Max: 98.4 (26 Aug 2021 17:51)  HR: 89 (26 Aug 2021 22:36) (89 - 90)  BP: 146/79 (26 Aug 2021 22:36) (146/79 - 159/87)  BP(mean): --  RR: 18 (26 Aug 2021 22:36) (18 - 18)  SpO2: 98% (26 Aug 2021 22:36) (97% - 98%)          LABS:                        13.3   11.68 )-----------( 218      ( 26 Aug 2021 20:04 )             40.4     08-26    140  |  102  |  14.2  ----------------------------<  150<H>  4.0   |  25.0  |  0.64    Ca    9.7      26 Aug 2021 20:04    TPro  7.9  /  Alb  4.1  /  TBili  0.8  /  DBili  x   /  AST  28  /  ALT  11  /  AlkPhos  80  08-26    PT/INR - ( 26 Aug 2021 20:04 )   PT: 11.3 sec;   INR: 0.97 ratio         PTT - ( 26 Aug 2021 20:04 )  PTT:25.0 sec      RADIOLOGY & ADDITIONAL STUDIES:    cardiac evaluation, TTE 12/2020 reviewed: AFIB, moderate AS,    Patient is an ASA class  3    anesthetic plan per anesthesia attending on day of surgery

## 2021-08-27 ENCOUNTER — TRANSCRIPTION ENCOUNTER (OUTPATIENT)
Age: 86
End: 2021-08-27

## 2021-08-27 DIAGNOSIS — S72.009A FRACTURE OF UNSPECIFIED PART OF NECK OF UNSPECIFIED FEMUR, INITIAL ENCOUNTER FOR CLOSED FRACTURE: ICD-10-CM

## 2021-08-27 LAB
ALBUMIN SERPL ELPH-MCNC: 3.8 G/DL — SIGNIFICANT CHANGE UP (ref 3.3–5.2)
ALP SERPL-CCNC: 70 U/L — SIGNIFICANT CHANGE UP (ref 40–120)
ALT FLD-CCNC: 11 U/L — SIGNIFICANT CHANGE UP
ANION GAP SERPL CALC-SCNC: 7 MMOL/L — SIGNIFICANT CHANGE UP (ref 5–17)
APTT BLD: 25.4 SEC — LOW (ref 27.5–35.5)
AST SERPL-CCNC: 24 U/L — SIGNIFICANT CHANGE UP
BASOPHILS # BLD AUTO: 0.03 K/UL — SIGNIFICANT CHANGE UP (ref 0–0.2)
BASOPHILS NFR BLD AUTO: 0.3 % — SIGNIFICANT CHANGE UP (ref 0–2)
BILIRUB SERPL-MCNC: 0.8 MG/DL — SIGNIFICANT CHANGE UP (ref 0.4–2)
BUN SERPL-MCNC: 13.3 MG/DL — SIGNIFICANT CHANGE UP (ref 8–20)
CALCIUM SERPL-MCNC: 9.3 MG/DL — SIGNIFICANT CHANGE UP (ref 8.6–10.2)
CHLORIDE SERPL-SCNC: 104 MMOL/L — SIGNIFICANT CHANGE UP (ref 98–107)
CO2 SERPL-SCNC: 29 MMOL/L — SIGNIFICANT CHANGE UP (ref 22–29)
CREAT SERPL-MCNC: 0.5 MG/DL — SIGNIFICANT CHANGE UP (ref 0.5–1.3)
EOSINOPHIL # BLD AUTO: 0.02 K/UL — SIGNIFICANT CHANGE UP (ref 0–0.5)
EOSINOPHIL NFR BLD AUTO: 0.2 % — SIGNIFICANT CHANGE UP (ref 0–6)
GLUCOSE SERPL-MCNC: 134 MG/DL — HIGH (ref 70–99)
HCT VFR BLD CALC: 39.2 % — SIGNIFICANT CHANGE UP (ref 34.5–45)
HGB BLD-MCNC: 12.8 G/DL — SIGNIFICANT CHANGE UP (ref 11.5–15.5)
IMM GRANULOCYTES NFR BLD AUTO: 0.4 % — SIGNIFICANT CHANGE UP (ref 0–1.5)
INR BLD: 0.95 RATIO — SIGNIFICANT CHANGE UP (ref 0.88–1.16)
LYMPHOCYTES # BLD AUTO: 0.57 K/UL — LOW (ref 1–3.3)
LYMPHOCYTES # BLD AUTO: 5.3 % — LOW (ref 13–44)
MAGNESIUM SERPL-MCNC: 2 MG/DL — SIGNIFICANT CHANGE UP (ref 1.6–2.6)
MCHC RBC-ENTMCNC: 32.1 PG — SIGNIFICANT CHANGE UP (ref 27–34)
MCHC RBC-ENTMCNC: 32.7 GM/DL — SIGNIFICANT CHANGE UP (ref 32–36)
MCV RBC AUTO: 98.2 FL — SIGNIFICANT CHANGE UP (ref 80–100)
MONOCYTES # BLD AUTO: 0.77 K/UL — SIGNIFICANT CHANGE UP (ref 0–0.9)
MONOCYTES NFR BLD AUTO: 7.2 % — SIGNIFICANT CHANGE UP (ref 2–14)
NEUTROPHILS # BLD AUTO: 9.25 K/UL — HIGH (ref 1.8–7.4)
NEUTROPHILS NFR BLD AUTO: 86.6 % — HIGH (ref 43–77)
PHOSPHATE SERPL-MCNC: 3 MG/DL — SIGNIFICANT CHANGE UP (ref 2.4–4.7)
PLATELET # BLD AUTO: 192 K/UL — SIGNIFICANT CHANGE UP (ref 150–400)
POTASSIUM SERPL-MCNC: 5 MMOL/L — SIGNIFICANT CHANGE UP (ref 3.5–5.3)
POTASSIUM SERPL-SCNC: 5 MMOL/L — SIGNIFICANT CHANGE UP (ref 3.5–5.3)
PROT SERPL-MCNC: 7.6 G/DL — SIGNIFICANT CHANGE UP (ref 6.6–8.7)
PROTHROM AB SERPL-ACNC: 11.1 SEC — SIGNIFICANT CHANGE UP (ref 10.6–13.6)
RBC # BLD: 3.99 M/UL — SIGNIFICANT CHANGE UP (ref 3.8–5.2)
RBC # FLD: 13.8 % — SIGNIFICANT CHANGE UP (ref 10.3–14.5)
SARS-COV-2 RNA SPEC QL NAA+PROBE: SIGNIFICANT CHANGE UP
SODIUM SERPL-SCNC: 140 MMOL/L — SIGNIFICANT CHANGE UP (ref 135–145)
WBC # BLD: 10.68 K/UL — HIGH (ref 3.8–10.5)
WBC # FLD AUTO: 10.68 K/UL — HIGH (ref 3.8–10.5)

## 2021-08-27 PROCEDURE — 27245 TREAT THIGH FRACTURE: CPT | Mod: RT

## 2021-08-27 PROCEDURE — 73700 CT LOWER EXTREMITY W/O DYE: CPT | Mod: 26,RT,MG

## 2021-08-27 PROCEDURE — 72192 CT PELVIS W/O DYE: CPT | Mod: 26,MG

## 2021-08-27 PROCEDURE — 20680 REMOVAL OF IMPLANT DEEP: CPT | Mod: 59,LT

## 2021-08-27 PROCEDURE — 99223 1ST HOSP IP/OBS HIGH 75: CPT

## 2021-08-27 PROCEDURE — G1004: CPT

## 2021-08-27 PROCEDURE — 73552 X-RAY EXAM OF FEMUR 2/>: CPT | Mod: 26,RT

## 2021-08-27 PROCEDURE — 73501 X-RAY EXAM HIP UNI 1 VIEW: CPT | Mod: 26,RT

## 2021-08-27 PROCEDURE — 99221 1ST HOSP IP/OBS SF/LOW 40: CPT

## 2021-08-27 PROCEDURE — 70496 CT ANGIOGRAPHY HEAD: CPT | Mod: 26,MG

## 2021-08-27 RX ORDER — CEFAZOLIN SODIUM 1 G
2000 VIAL (EA) INJECTION
Refills: 0 | Status: COMPLETED | OUTPATIENT
Start: 2021-08-27 | End: 2021-08-28

## 2021-08-27 RX ORDER — SODIUM CHLORIDE 9 MG/ML
1000 INJECTION, SOLUTION INTRAVENOUS
Refills: 0 | Status: DISCONTINUED | OUTPATIENT
Start: 2021-08-27 | End: 2021-08-27

## 2021-08-27 RX ORDER — ENOXAPARIN SODIUM 100 MG/ML
40 INJECTION SUBCUTANEOUS DAILY
Refills: 0 | Status: DISCONTINUED | OUTPATIENT
Start: 2021-08-28 | End: 2021-09-01

## 2021-08-27 RX ORDER — ACETAMINOPHEN 500 MG
650 TABLET ORAL EVERY 6 HOURS
Refills: 0 | Status: DISCONTINUED | OUTPATIENT
Start: 2021-08-27 | End: 2021-08-27

## 2021-08-27 RX ORDER — LANOLIN ALCOHOL/MO/W.PET/CERES
3 CREAM (GRAM) TOPICAL AT BEDTIME
Refills: 0 | Status: DISCONTINUED | OUTPATIENT
Start: 2021-08-27 | End: 2021-08-27

## 2021-08-27 RX ORDER — TRAZODONE HCL 50 MG
1 TABLET ORAL
Qty: 0 | Refills: 0 | DISCHARGE

## 2021-08-27 RX ORDER — ONDANSETRON 8 MG/1
4 TABLET, FILM COATED ORAL ONCE
Refills: 0 | Status: DISCONTINUED | OUTPATIENT
Start: 2021-08-27 | End: 2021-08-27

## 2021-08-27 RX ORDER — OXYCODONE HYDROCHLORIDE 5 MG/1
5 TABLET ORAL EVERY 4 HOURS
Refills: 0 | Status: DISCONTINUED | OUTPATIENT
Start: 2021-08-27 | End: 2021-09-01

## 2021-08-27 RX ORDER — MORPHINE SULFATE 50 MG/1
2 CAPSULE, EXTENDED RELEASE ORAL ONCE
Refills: 0 | Status: DISCONTINUED | OUTPATIENT
Start: 2021-08-27 | End: 2021-08-27

## 2021-08-27 RX ORDER — ONDANSETRON 8 MG/1
4 TABLET, FILM COATED ORAL EVERY 8 HOURS
Refills: 0 | Status: DISCONTINUED | OUTPATIENT
Start: 2021-08-27 | End: 2021-08-27

## 2021-08-27 RX ORDER — FENTANYL CITRATE 50 UG/ML
25 INJECTION INTRAVENOUS
Refills: 0 | Status: DISCONTINUED | OUTPATIENT
Start: 2021-08-27 | End: 2021-08-27

## 2021-08-27 RX ADMIN — MORPHINE SULFATE 2 MILLIGRAM(S): 50 CAPSULE, EXTENDED RELEASE ORAL at 11:17

## 2021-08-27 RX ADMIN — FENTANYL CITRATE 25 MICROGRAM(S): 50 INJECTION INTRAVENOUS at 19:50

## 2021-08-27 RX ADMIN — SODIUM CHLORIDE 75 MILLILITER(S): 9 INJECTION, SOLUTION INTRAVENOUS at 21:12

## 2021-08-27 RX ADMIN — SODIUM CHLORIDE 75 MILLILITER(S): 9 INJECTION, SOLUTION INTRAVENOUS at 19:30

## 2021-08-27 RX ADMIN — FENTANYL CITRATE 25 MICROGRAM(S): 50 INJECTION INTRAVENOUS at 20:00

## 2021-08-27 NOTE — DISCHARGE NOTE PROVIDER - NSDCMRMEDTOKEN_GEN_ALL_CORE_FT
ascorbic acid 500 mg oral tablet: 1 tab(s) orally once a day  aspirin 81 mg oral delayed release tablet: 1 tab(s) orally once a day  dilTIAZem 180 mg/24 hours oral capsule, extended release: 1 cap(s) orally once a day  ferrous sulfate 324 mg (65 mg elemental iron) oral delayed release tablet: 1 tab(s) orally once a day   metoprolol succinate 25 mg oral tablet, extended release: 1 tab(s) orally once a day  Multiple Vitamins oral tablet: 1 tab(s) orally once a day  pantoprazole 40 mg intravenous injection: 40 milligram(s) intravenous once a day  pantoprazole 40 mg oral delayed release tablet: 1 tab(s) orally once a day (before a meal)  pantoprazole 40 mg oral delayed release tablet: 1 tab(s) orally once a day   senna oral tablet: 2 tab(s) orally once a day (at bedtime), As needed, Constipation  traZODone 50 mg oral tablet: 1 tab(s) orally once a day (at bedtime)   acetaminophen 325 mg oral tablet: 3 tab(s) orally every 8 hours  ascorbic acid 500 mg oral tablet: 1 tab(s) orally once a day  aspirin 81 mg oral delayed release tablet: 1 tab(s) orally once a day  enoxaparin: 40 milligram(s) injectable 2 times a day  metoprolol succinate 25 mg oral tablet, extended release: 1 tab(s) orally once a day  Multiple Vitamins oral tablet: 1 tab(s) orally once a day  oxyCODONE 5 mg oral tablet: 1 tab(s) orally every 4 hours, As needed, Severe Pain (7 - 10)  pantoprazole 40 mg intravenous injection: 40 milligram(s) intravenous once a day  polyethylene glycol 3350 oral powder for reconstitution: 17 gram(s) orally once a day  senna oral tablet: 2 tab(s) orally once a day (at bedtime), As needed, Constipation  traZODone 50 mg oral tablet: 1 tab(s) orally once a day (at bedtime)

## 2021-08-27 NOTE — CONSULT NOTE ADULT - ASSESSMENT
88F with incidental Right transverse sinus thrombosis     Recommendations   - Will discuss with attending clearance for OR   - Will discuss AC plan post op   - No further imaging at this time   - q4 neuro checks   - NPO  88F with incidental Right transverse sinus thrombosis     Recommendations   - Cleared for OR, MRI non con brain post po    - Will discuss AC plan post op    - q4 neuro checks   - NPO

## 2021-08-27 NOTE — DISCHARGE NOTE PROVIDER - CARE PROVIDER_API CALL
Billy Ramires)  Orthopedics  [address] [place] [address]  Phone: [phone]  Fax: [phone]  Follow Up Time:    Billy Ramires)  Orthopedics  [address] [place] [address]  Phone: [phone]  Fax: [phone]  Follow Up Time:     Nate Singh; PhD)  Neurosurgery  [address] [place] [address]  Phone: [phone]  Fax: [phone]  Follow Up Time: 2 weeks

## 2021-08-27 NOTE — PATIENT PROFILE ADULT - CENTRAL VENOUS CATHETER/PICC LINE
Refill request for: sertraline (ZOLOFT) 50 MG tablet   Last medication refill: 12/1/20 #180   Last office visit: 1/24/20  Pharmacy: [facility] [place]    Comments: #60 NO REFILLS SENT TO PHARMACY   no

## 2021-08-27 NOTE — H&P ADULT - ASSESSMENT
ASSESSMENT:  88F with PMHX AFIB s/p mechanical fall admitted for R Hip Fracture and R Transverse Sinus Thrombosis     PLAN:  R Transverse Sinus Thrombosis  -Admit to Medicine/SDU  -Neurochecks Q4  -Repeat Labs  -NPO  -cleared for OR from NeuroSx perspective  -Plan for MRI Brain WO post-operatively   -Plan for AC post-op   -Reccs per NeuroSx    Mechanical Fall, R Femur Fracture  -Strict Bedrest  -cleared by Trauma Sx  -Avoid aggressive BP drop due to preload dependency in setting of Moderate AS on last TTE  -COVID19 negative  -Plan for OR per Ortho Sx  -Cleared by NeuroSx re: Sinus Thrombosis  -Attempted to call Spouse/Emergency Contact Arnold [phone] without answer overnight   -Unable to risk stratify patient given mild confusion (AAOx2) and poor historian  -Need to obtain med rec and further history from family   -RCRI and Risk Stratification pending above    AFIB  -Apparently no longer on full dose DOAC/NOAC 2/2 GIB  -ASA 81mg PO q24 restart post-op   -Previously on Cardizem but currently appears most recent pharmacy refill was [facility]?  -Please confirm med rec in AM    Anemia  -Chronic. Monitor H&H. T&S.

## 2021-08-27 NOTE — DISCHARGE NOTE PROVIDER - DETAILS OF MALNUTRITION DIAGNOSIS/DIAGNOSES
This patient has been assessed with a concern for Malnutrition and was treated during this hospitalization for the following Nutrition diagnosis/diagnoses:     -  08/31/2021: Severe protein-calorie malnutrition

## 2021-08-27 NOTE — DISCHARGE NOTE PROVIDER - PROVIDER TOKENS
PROVIDER:[TOKEN:[[id number]]] PROVIDER:[TOKEN:[[id number]]],PROVIDER:[TOKEN:[[id number]],FOLLOWUP:[2 weeks]]

## 2021-08-27 NOTE — CONSULT NOTE ADULT - ATTENDING COMMENTS
pt seen and examined. s/p fall, unclear mechanism as pt is poor historian and does not remember. Well healed surgical scar on lat RLE. TTP over GT, leg short and ext rotated. NVID. Toes bcr. D/w  normal need for op fixation. D/w him risks/benefits. Pt cleared by NSG and trauma, not on AC. Will proceed /w IMN of RLE.
88F with no history of fall or other trauma,  seen and examined 08-26-21 @ 2587 as trauma consult.    She states that her right hip began hurting yesterday and then "gave out" this morning while in the bathroom. She denies falling prior to her hip "giving out".  She does not take bisphosphonates.    7/2/2016 @ [facility] - ORIF of comminuted distal right femur fracture    GCS = 15  hemodynamically stable  NC/AT  PERRL  EOMI  no c-spine tenderness or limit in full active ROM  no t-spine or l-spine tenderness  no chest wall tenderness  soft / NT / ND  right hip tenderness  RLE shortened and externally rotated  no gross neurologic deficit      X-ray right hip / femur - comminuted right intertrochanteric femur fracture with subtrochanteric extension, periarticular ORIF plate on lateral femur, right TKA prosthetic    CXR - no pneumothorax, hemothorax or displaced rib fractures    CT head / c-spine - no intracranial hemorrhage or c-spine fracture      right hip insufficiency fracture  -further care as per medicine and orthopedics
agree

## 2021-08-27 NOTE — PROGRESS NOTE ADULT - SUBJECTIVE AND OBJECTIVE BOX
Ortho Post Op Check    Name: JANEY NUNEZ    MR #: [id number]    Procedure: Right Hip IM Nail  Surgeon: Keyla    Pt comfortable without complaints, pain controlled  Denies CP, SOB, N/V, numbness/tingling     General Exam:  Vital Signs Last 24 Hrs  T(C): 36.5 (08-27-21 @ 18:55), Max: 37.5 (08-27-21 @ 14:38)  T(F): 97.7 (08-27-21 @ 18:55), Max: 99.5 (08-27-21 @ 14:38)  HR: 77 (08-27-21 @ 19:30) (70 - 80)  BP: 137/69 (08-27-21 @ 19:30) (125/80 - 146/103)  BP(mean): 91 (08-27-21 @ 19:30) (91 - 91)  RR: 14 (08-27-21 @ 19:30) (11 - 16)  SpO2: 100% (08-27-21 @ 19:30) (97% - 100%)    General: Pt Alert and oriented, NAD, controlled pain.  Dressings C/D/I. No bleeding.  Pulses: 2+ dorsalis pedis pulse. Cap refill < 2 sec.  Sensation: Grossly intact to light touch without deficit.  Motor: + EHL/FHL/TA/GS    Post-op X-Ray: pending    A/P: 88yFemale POD#0 s/p Right Hip IM Nail  - Stable  - Pain Control  - DVT ppx: Lovenox  - Post op abx: ancef  - WBAT

## 2021-08-27 NOTE — CHART NOTE - NSCHARTNOTEFT_GEN_A_CORE
Tertiary Trauma Survey (TTS)    Date of TTS:  8/27/21                Time: 6:00 am  Admit Date:   8/26/21               Trauma Activation: Consult  Admit GCS: 15    HPI:  Non traumatic right intertrochanteric fracture    PAST MEDICAL & SURGICAL HISTORY:  Hypertension    Osteoarthritis    Afib  controlled with Diltiazem    H/O total knee replacement, bilateral    H/O bladder repair surgery  2007      [  ] No significant past history as reviewed with the patient and family    FAMILY HISTORY:  Family hx of colon cancer  sister      [  ] Family history not pertinent as reviewed with the patient and family    SOCIAL HISTORY:    Medications (inpatient): ceFAZolin   IVPB 2000 milliGRAM(s) IV Intermittent once    Medications (PRN):  Allergies: No Known Allergies  (Intolerances: )    Vital Signs Last 24 Hrs  T(C): 36.7 (26 Aug 2021 23:54), Max: 36.9 (26 Aug 2021 17:51)  T(F): 98.1 (26 Aug 2021 23:54), Max: 98.4 (26 Aug 2021 17:51)  HR: 84 (26 Aug 2021 23:54) (84 - 90)  BP: 159/74 (26 Aug 2021 23:54) (146/79 - 159/87)  BP(mean): --  RR: 18 (26 Aug 2021 23:54) (18 - 18)  SpO2: 96% (26 Aug 2021 23:54) (96% - 98%)  Drug Dosing Weight  Height (cm): 165.1 (26 Aug 2021 17:51)  Weight (kg): 49.9 (26 Aug 2021 17:51)  BMI (kg/m2): 18.3 (26 Aug 2021 17:51)  BSA (m2): 1.53 (26 Aug 2021 17:51)    Exam:  Head: NCAT, MMM  Eyes: normal visual acuity, EOMI  Nose: no septal hematoma  Neuro: CN 2-12 intact, normal activity, normal strength, SILT bilaterally  Neck: soft, supple  Chest: normal work of breathing, chest expansion  Pulm: comfortable, no accessory muscle use  Abd: soft, nt, nd  Back: no e/o trauma or abrasions, no midline tenderness  Ext: right leg external rotation and shorter than left leg, tenderness along the right thigh, and right hip, intact peripheral motor function, intact sensation and peripheral circulation,  Right elbow bruise                          13.3   11.68 )-----------( 218      ( 26 Aug 2021 20:04 )             40.4     08-26    140  |  102  |  14.2  ----------------------------<  150<H>  4.0   |  25.0  |  0.64    Ca    9.7      26 Aug 2021 20:04    TPro  7.9  /  Alb  4.1  /  TBili  0.8  /  DBili  x   /  AST  28  /  ALT  11  /  AlkPhos  80  08-26    PT/INR - ( 26 Aug 2021 20:04 )   PT: 11.3 sec;   INR: 0.97 ratio         PTT - ( 26 Aug 2021 20:04 )  PTT:25.0 sec      List Injuries Identified to Date:    List Operative and Interventional Radiological Procedures:     Consults (Date):  [x] Neurosurgery   [x] Orthopedics  [  ] Plastics  [  ] Urology  [  ] PM&R  [  ] Social Work    RADIOLOGICAL FINDINGS REVIEW:    Head CT:  < from: CT Angio Head w/ IV Cont (08.27.21 @ 01:25) >    FINDINGS:    CTV of the brain demonstrates thrombus in the right transverse sinus. There is normal filling of superior sagittal, inferior sagittal, sigmoid and straight sinuses, vein of Manjinder and internal cerebral veins. The visualized cortical veins as grossly unremarkable.    There is no abnormal parenchymal or leptomeningeal enhancement.      IMPRESSION: Thrombus noted in the right transverse sinus, correlating with the CT finding dated 8/26/2021.    < end of copied text >  C-Spine CT:    < from: CT Head No Cont (08.26.21 @ 20:31) >      CERVICAL SPINE:  No fracture or traumatic subluxation.    Multilevel spondylosis.    < end of copied text >      ABD/Pelvis CT:    < from: CT Pelvis Bony Only No Cont (08.27.21 @ 01:06) >    IMPRESSION:  Bony pelvis: Comminuted intertrochanteric fracture of the right femur with mild subtrochanteric extension.    Right femur: Approximately 30 cm long cortical plate extending from the mid femoral diaphysis to the lateral femoral condyle.  The mid and distal right femur are intact.  No fracture or lucency around the hardware.    < end of copied text >        Incidental findings:   [ ] yes   [x] no
Pt seen and examined bedside.  Agree and will continue with current plan.

## 2021-08-27 NOTE — DISCHARGE NOTE PROVIDER - NSDCQMPCI_CARD_ALL_CORE
Deann   Needle gauge: 22 G               No

## 2021-08-27 NOTE — DISCHARGE NOTE PROVIDER - NSDCFUADDINST_GEN_ALL_CORE_FT
The patient will be seen in the office between 2-3 weeks for wound check. Patient may shower after post-op day #5. The dressing is to be removed on POD#10. IF THE DRESSING BECOMES SOILED BEFORE THE REMOVAL DATE, CHANGE WITH A SIMILAR DRESSING. IF THE DRESSING BECOMES STAINED WITH DISCHARGE, CONTACT THE OFFICE FOR FURTHER DIRECTIONS.  The patient will contact the office if the wound becomes red, has increasing pain, develops bleeding or discharge, an injury occurs, or has other concerns. The patient will continue PT for gait training. The patient will continue LOVENOX for 4 weeks for blood clot prevention. The patient will take pain medication as prescribed for pain control and titrate according to prescription and patient needs. The patient is FULL weight bearing.

## 2021-08-27 NOTE — H&P ADULT - NSHPPHYSICALEXAM_GEN_ALL_CORE
Vital Signs Last 24 Hrs  T(C): 36.7 (26 Aug 2021 23:54), Max: 36.9 (26 Aug 2021 17:51)  T(F): 98.1 (26 Aug 2021 23:54), Max: 98.4 (26 Aug 2021 17:51)  HR: 84 (26 Aug 2021 23:54) (84 - 90)  BP: 159/74 (26 Aug 2021 23:54) (146/79 - 159/87)  BP(mean): --  RR: 18 (26 Aug 2021 23:54) (18 - 18)  SpO2: 96% (26 Aug 2021 23:54) (96% - 98%)    Constitutional: Well-appearing, NAD, VSS  Head: NC/AT  Eyes: PERRL, EOMI, anicteric sclera, conjunctiva WNL  ENT: Normal Pharynx, MMM, No tonsillar exudate/erythema  Neck: Supple, Non-tender  Chest: Non-tender, no rashes  Cardio: RRR, s1/s2, no appreciable murmurs/rubs/gallops  Resp: BS CTA bilaterally, no wheezing/rhonchi/rales  Abd: Soft, Non-tender, Non-distended, no rebound/guarding/rigidity  : not examined  Rectal: not examined  MSK: moving all extremities, RLE short/externally rotated  Ext: palpable distal pulses, good capillary refill   Psych: appropriate, cooperative, AAOx2 mildly confused  Neuro: CN II-XII grossly intact, no focal deficits  Skin: Warm/Dry. No rashes.

## 2021-08-27 NOTE — H&P ADULT - HISTORY OF PRESENT ILLNESS
88F with PMHX HTN, AFIB, SHAHIDA, OA BIBEMS s/p mechanical fall when her hip gave out. Patient evaluated by Trauma Sx with scans done which were suspicious for transverse sinus thrombosis. CTV confirmed sinus thrombosis. RLE shortened and externally rotated. CT imaging +acute R hip fracture. Cleared by Trauma Sx. Ortho Sx plan for OR pending NeuroSx eval. Cleared by NeuroSx with plan for MRI and discussion of AC post-operatively. Pt seen/examined. Labs/imaging reviewed. Surgical reccs appreciated. Patient poor historian and really unable to provide HPI/ROS. Denies any pmhx or meds besides nasal spray which appears untrue. Does not know any meds/pharmacy info. Attempted to call emergency contact patients spouse overnight Arnold [phone] with no answer. Denies CP/SOB, F/C, N/V/D, abd pain, urinary complaints. ROS negative.

## 2021-08-27 NOTE — DISCHARGE NOTE PROVIDER - NSDCCPCAREPLAN_GEN_ALL_CORE_FT
PRINCIPAL DISCHARGE DIAGNOSIS  Diagnosis: Hip fracture  Assessment and Plan of Treatment:       SECONDARY DISCHARGE DIAGNOSES  Diagnosis: Thrombosis of lateral venous sinus  Assessment and Plan of Treatment:      PRINCIPAL DISCHARGE DIAGNOSIS  Diagnosis: Hip fracture  Assessment and Plan of Treatment: s/p IM nailing  WBAT  pain control as directed  F/U Ortho OP      SECONDARY DISCHARGE DIAGNOSES  Diagnosis: Thrombosis of lateral venous sinus  Assessment and Plan of Treatment: No surgical intervention at this time  C/w ASA  F/U NS OP    Diagnosis: Afib  Assessment and Plan of Treatment: C/w Metoprolol as directed     PRINCIPAL DISCHARGE DIAGNOSIS  Diagnosis: Hip fracture  Assessment and Plan of Treatment: s/p IM nailing  WBAT  pain control as directed, continue with Enoxaparin for 4 weeks  F/U Ortho OP      SECONDARY DISCHARGE DIAGNOSES  Diagnosis: Thrombosis of lateral venous sinus  Assessment and Plan of Treatment: No surgical intervention at this time  C/w ASA      Diagnosis: Afib  Assessment and Plan of Treatment: C/w Metoprolol as directed

## 2021-08-27 NOTE — DISCHARGE NOTE PROVIDER - HOSPITAL COURSE
89yo female w/PMHx AFIB s/p mechanical fall admitted for Rt Hip Fx and Rt Transverse Sinus Thrombosis. Ortho consulted, Pt is s/p IM nailing, WBAT  pain control as directed. Pt to F/U Ortho OP for further management. NS also consulted for Rt Transverse Sinus Thrombosis, MRI and Venogram performed, no surgical intervention at this time, will need to c/w ASA and F/U NS OP for further management. Pt will also need to c/w Metoprolol for Tx of Afib as directed.   Pt seen and examined at bedside by Hospitalist and PA. No acute overnight events to report. Denies SOB, cough, CP, palpitations, HA, dizziness, NVD, changes in urinary or bowel habits, fevers, chills. All other remaining ROS negative. Pt is medically stable for D/C to [facility].     Time spent on Discharge: 35 minutes                   87yo female w/PMHx AFIB s/p mechanical fall admitted for Rt Hip Fx and Rt Transverse Sinus Thrombosis. Ortho consulted, Pt is s/p IM nailing on 08/31.  Pt to F/U Ortho OP in 2-3wks for further management and wound care, will c/w Lovenox x4wks, FULL Wt bearing, pain control as directed, Pt may shower, dressing to be removed on POD 10 and replaced w/new dressing, Pt will c/w PT for gait training. NS also consulted for Rt Transverse Sinus Thrombosis, MRI and Venogram performed, no surgical intervention at this time, will need to c/w ASA and F/U NS OP for further management. Pt will also need to c/w Metoprolol for Tx of Afib as directed.     Pt seen and examined at bedside by Hospitalist and PA. No acute overnight events to report. Denies SOB, cough, CP, palpitations, HA, dizziness, NVD, changes in urinary or bowel habits, fevers, chills. All other remaining ROS negative. Pt is medically stable for D/C to [facility].     Time spent on Discharge: 35 minutes

## 2021-08-27 NOTE — H&P ADULT - NSHPLABSRESULTS_GEN_ALL_CORE
CT BRAIN: IMPRESSION: No intracranial mass effect or hemorrhage. Hyperdensity within the right transverse sinus. The possibility of venous sinus thrombosis is raised. CT venography is advised for further evaluation if not clinically contraindicated.    CERVICAL SPINE: IMPRESSION: No fracture or traumatic subluxation. Multilevel spondylosis.    CTA Head: IMPRESSION: Thrombus noted in the right transverse sinus    CT Femur/Pelvis: IMPRESSION:   Bony pelvis: Comminuted intertrochanteric fracture of the right femur with mild subtrochanteric extension.  Right femur: Approximately 30 cm long cortical plate extending from the mid femoral diaphysis to the lateral femoral condyle.  The mid and distal right femur are intact.  No fracture or lucency around the hardware.

## 2021-08-28 LAB
ALBUMIN SERPL ELPH-MCNC: 3.5 G/DL — SIGNIFICANT CHANGE UP (ref 3.3–5.2)
ALP SERPL-CCNC: 66 U/L — SIGNIFICANT CHANGE UP (ref 40–120)
ALT FLD-CCNC: 10 U/L — SIGNIFICANT CHANGE UP
ANION GAP SERPL CALC-SCNC: 12 MMOL/L — SIGNIFICANT CHANGE UP (ref 5–17)
ANISOCYTOSIS BLD QL: SLIGHT — SIGNIFICANT CHANGE UP
AST SERPL-CCNC: 27 U/L — SIGNIFICANT CHANGE UP
BASOPHILS # BLD AUTO: 0.02 K/UL — SIGNIFICANT CHANGE UP (ref 0–0.2)
BASOPHILS NFR BLD AUTO: 0.2 % — SIGNIFICANT CHANGE UP (ref 0–2)
BILIRUB SERPL-MCNC: 0.5 MG/DL — SIGNIFICANT CHANGE UP (ref 0.4–2)
BUN SERPL-MCNC: 16.2 MG/DL — SIGNIFICANT CHANGE UP (ref 8–20)
CALCIUM SERPL-MCNC: 8.7 MG/DL — SIGNIFICANT CHANGE UP (ref 8.6–10.2)
CHLORIDE SERPL-SCNC: 104 MMOL/L — SIGNIFICANT CHANGE UP (ref 98–107)
CO2 SERPL-SCNC: 24 MMOL/L — SIGNIFICANT CHANGE UP (ref 22–29)
COVID-19 SPIKE DOMAIN AB INTERP: POSITIVE
COVID-19 SPIKE DOMAIN ANTIBODY RESULT: >250 U/ML — HIGH
CREAT SERPL-MCNC: 0.5 MG/DL — SIGNIFICANT CHANGE UP (ref 0.5–1.3)
ELLIPTOCYTES BLD QL SMEAR: SLIGHT — SIGNIFICANT CHANGE UP
EOSINOPHIL # BLD AUTO: 0 K/UL — SIGNIFICANT CHANGE UP (ref 0–0.5)
EOSINOPHIL NFR BLD AUTO: 0 % — SIGNIFICANT CHANGE UP (ref 0–6)
GLUCOSE SERPL-MCNC: 144 MG/DL — HIGH (ref 70–99)
HCT VFR BLD CALC: 37.5 % — SIGNIFICANT CHANGE UP (ref 34.5–45)
HGB BLD-MCNC: 11.7 G/DL — SIGNIFICANT CHANGE UP (ref 11.5–15.5)
IMM GRANULOCYTES NFR BLD AUTO: 0.5 % — SIGNIFICANT CHANGE UP (ref 0–1.5)
LYMPHOCYTES # BLD AUTO: 0.48 K/UL — LOW (ref 1–3.3)
LYMPHOCYTES # BLD AUTO: 4.6 % — LOW (ref 13–44)
MACROCYTES BLD QL: SLIGHT — SIGNIFICANT CHANGE UP
MAGNESIUM SERPL-MCNC: 2.1 MG/DL — SIGNIFICANT CHANGE UP (ref 1.6–2.6)
MANUAL SMEAR VERIFICATION: SIGNIFICANT CHANGE UP
MCHC RBC-ENTMCNC: 31.2 GM/DL — LOW (ref 32–36)
MCHC RBC-ENTMCNC: 31.8 PG — SIGNIFICANT CHANGE UP (ref 27–34)
MCV RBC AUTO: 101.9 FL — HIGH (ref 80–100)
MICROCYTES BLD QL: SLIGHT — SIGNIFICANT CHANGE UP
MONOCYTES # BLD AUTO: 0.83 K/UL — SIGNIFICANT CHANGE UP (ref 0–0.9)
MONOCYTES NFR BLD AUTO: 8 % — SIGNIFICANT CHANGE UP (ref 2–14)
NEUTROPHILS # BLD AUTO: 9.02 K/UL — HIGH (ref 1.8–7.4)
NEUTROPHILS NFR BLD AUTO: 86.7 % — HIGH (ref 43–77)
OVALOCYTES BLD QL SMEAR: SLIGHT — SIGNIFICANT CHANGE UP
PHOSPHATE SERPL-MCNC: 3.4 MG/DL — SIGNIFICANT CHANGE UP (ref 2.4–4.7)
PLAT MORPH BLD: NORMAL — SIGNIFICANT CHANGE UP
PLATELET # BLD AUTO: 144 K/UL — LOW (ref 150–400)
PLATELET CLUMP BLD QL SMEAR: SLIGHT
PLATELET COUNT - ESTIMATE: NORMAL — SIGNIFICANT CHANGE UP
POIKILOCYTOSIS BLD QL AUTO: SLIGHT — SIGNIFICANT CHANGE UP
POTASSIUM SERPL-MCNC: 4.9 MMOL/L — SIGNIFICANT CHANGE UP (ref 3.5–5.3)
POTASSIUM SERPL-SCNC: 4.9 MMOL/L — SIGNIFICANT CHANGE UP (ref 3.5–5.3)
PROT SERPL-MCNC: 7.2 G/DL — SIGNIFICANT CHANGE UP (ref 6.6–8.7)
RBC # BLD: 3.68 M/UL — LOW (ref 3.8–5.2)
RBC # FLD: 13.9 % — SIGNIFICANT CHANGE UP (ref 10.3–14.5)
RBC BLD AUTO: NORMAL — SIGNIFICANT CHANGE UP
SARS-COV-2 IGG+IGM SERPL QL IA: >250 U/ML — HIGH
SARS-COV-2 IGG+IGM SERPL QL IA: POSITIVE
SODIUM SERPL-SCNC: 140 MMOL/L — SIGNIFICANT CHANGE UP (ref 135–145)
WBC # BLD: 10.4 K/UL — SIGNIFICANT CHANGE UP (ref 3.8–10.5)
WBC # FLD AUTO: 10.4 K/UL — SIGNIFICANT CHANGE UP (ref 3.8–10.5)

## 2021-08-28 PROCEDURE — 99233 SBSQ HOSP IP/OBS HIGH 50: CPT

## 2021-08-28 RX ORDER — ACETAMINOPHEN 500 MG
1000 TABLET ORAL ONCE
Refills: 0 | Status: DISCONTINUED | OUTPATIENT
Start: 2021-08-28 | End: 2021-08-28

## 2021-08-28 RX ORDER — METOPROLOL TARTRATE 50 MG
25 TABLET ORAL DAILY
Refills: 0 | Status: DISCONTINUED | OUTPATIENT
Start: 2021-08-28 | End: 2021-09-01

## 2021-08-28 RX ORDER — DIAZEPAM 5 MG
1 TABLET ORAL ONCE
Refills: 0 | Status: DISCONTINUED | OUTPATIENT
Start: 2021-08-28 | End: 2021-09-01

## 2021-08-28 RX ORDER — ACETAMINOPHEN 500 MG
750 TABLET ORAL ONCE
Refills: 0 | Status: COMPLETED | OUTPATIENT
Start: 2021-08-28 | End: 2021-08-28

## 2021-08-28 RX ORDER — ASPIRIN/CALCIUM CARB/MAGNESIUM 324 MG
81 TABLET ORAL DAILY
Refills: 0 | Status: DISCONTINUED | OUTPATIENT
Start: 2021-08-28 | End: 2021-09-01

## 2021-08-28 RX ORDER — SODIUM CHLORIDE 9 MG/ML
1000 INJECTION INTRAMUSCULAR; INTRAVENOUS; SUBCUTANEOUS
Refills: 0 | Status: DISCONTINUED | OUTPATIENT
Start: 2021-08-28 | End: 2021-08-31

## 2021-08-28 RX ORDER — MORPHINE SULFATE 50 MG/1
0.5 CAPSULE, EXTENDED RELEASE ORAL ONCE
Refills: 0 | Status: DISCONTINUED | OUTPATIENT
Start: 2021-08-28 | End: 2021-09-01

## 2021-08-28 RX ORDER — MORPHINE SULFATE 50 MG/1
1 CAPSULE, EXTENDED RELEASE ORAL ONCE
Refills: 0 | Status: DISCONTINUED | OUTPATIENT
Start: 2021-08-28 | End: 2021-09-01

## 2021-08-28 RX ORDER — ACETAMINOPHEN 500 MG
975 TABLET ORAL EVERY 8 HOURS
Refills: 0 | Status: DISCONTINUED | OUTPATIENT
Start: 2021-08-28 | End: 2021-09-01

## 2021-08-28 RX ADMIN — OXYCODONE HYDROCHLORIDE 5 MILLIGRAM(S): 5 TABLET ORAL at 09:58

## 2021-08-28 RX ADMIN — ENOXAPARIN SODIUM 40 MILLIGRAM(S): 100 INJECTION SUBCUTANEOUS at 11:54

## 2021-08-28 RX ADMIN — Medication 100 MILLIGRAM(S): at 11:53

## 2021-08-28 RX ADMIN — Medication 975 MILLIGRAM(S): at 16:10

## 2021-08-28 RX ADMIN — Medication 100 MILLIGRAM(S): at 01:36

## 2021-08-28 RX ADMIN — Medication 300 MILLIGRAM(S): at 11:53

## 2021-08-28 RX ADMIN — Medication 975 MILLIGRAM(S): at 22:17

## 2021-08-28 RX ADMIN — Medication 750 MILLIGRAM(S): at 11:52

## 2021-08-28 RX ADMIN — Medication 81 MILLIGRAM(S): at 17:39

## 2021-08-28 RX ADMIN — SODIUM CHLORIDE 75 MILLILITER(S): 9 INJECTION INTRAMUSCULAR; INTRAVENOUS; SUBCUTANEOUS at 11:56

## 2021-08-28 RX ADMIN — OXYCODONE HYDROCHLORIDE 5 MILLIGRAM(S): 5 TABLET ORAL at 08:41

## 2021-08-28 NOTE — PROGRESS NOTE ADULT - SUBJECTIVE AND OBJECTIVE BOX
JANEY MAYRA    [id number]    History:  The patient is status post right hip IMN, POD # 1. Patient is doing well. The patient's pain is controlled using the prescribed pain medications. Denies nausea, vomiting, chest pain, shortness of breath, abdominal pain or fever. No new complaints. No acute motor or sensory changes are reported.    Vital Signs Last 24 Hrs  T(C): 36.4 (28 Aug 2021 10:35), Max: 37.5 (27 Aug 2021 14:38)  T(F): 97.6 (28 Aug 2021 10:35), Max: 99.5 (27 Aug 2021 14:38)  HR: 81 (28 Aug 2021 08:17) (70 - 91)  BP: 140/73 (28 Aug 2021 08:17) (114/90 - 159/88)  BP(mean): 87 (27 Aug 2021 20:45) (67 - 102)  RR: 16 (28 Aug 2021 08:17) (11 - 19)  SpO2: 98% (28 Aug 2021 08:17) (94% - 100%)  I&O's Summary    27 Aug 2021 07:01  -  28 Aug 2021 07:00  --------------------------------------------------------  IN: 150 mL / OUT: 1615 mL / NET: -1465 mL                              11.7   10.40 )-----------( 144      ( 28 Aug 2021 06:42 )             37.5     08-28    140  |  104  |  16.2  ----------------------------<  144<H>  4.9   |  24.0  |  0.50    Ca    8.7      28 Aug 2021 06:42  Phos  3.4     08-28  Mg     2.1     08-28    TPro  7.2  /  Alb  3.5  /  TBili  0.5  /  DBili  x   /  AST  27  /  ALT  10  /  AlkPhos  66  08-28      MEDICATIONS  (STANDING):  acetaminophen   Tablet .. 975 milliGRAM(s) Oral every 8 hours  acetaminophen  IVPB .. 750 milliGRAM(s) IV Intermittent once  ceFAZolin   IVPB 2000 milliGRAM(s) IV Intermittent <User Schedule>  enoxaparin Injectable 40 milliGRAM(s) SubCutaneous daily  sodium chloride 0.9%. 1000 milliLiter(s) (75 mL/Hr) IV Continuous <Continuous>    MEDICATIONS  (PRN):  morphine  - Injectable 0.5 milliGRAM(s) IV Push once PRN Severe Pain (7 - 10)  oxyCODONE    IR 5 milliGRAM(s) Oral every 4 hours PRN Severe Pain (7 - 10)      Physical exam: Lying in bed in NAD, awake and alert.  Right lower extremity- The prox dressing is clean, dry and intact. The distal dressing is saturated with bloody dc through gauze. No wound erythema, discharge. Incision sites are intact. No active dc noted. Calf soft. No calf tenderness. Sensation to light touch is grossly intact distally. Motor function distally is 5/5. No foot drop. +DF/PF. 2+ dorsalis pedis pulse. Capillary refill is less than 2 seconds. No cyanosis.    Primary Orthopedic Assessment:  • S/P right hip IMN, POD#1    Plan:   - dsgs changed  • DVT prophylaxis per medicine  • Continue physical therapy  - WBAT  • Pain control as clinically indicated  • Incentive spirometry encouraged  • Medical/neuro management    JANEY MAYRA    [id number]    History:  The patient is status post right hip IMN, POD # 1. Patient is doing well. The patient's pain is controlled using the prescribed pain medications. Denies nausea, vomiting, chest pain, shortness of breath, abdominal pain or fever. No new complaints. No acute motor or sensory changes are reported.    Vital Signs Last 24 Hrs  T(C): 36.4 (28 Aug 2021 10:35), Max: 37.5 (27 Aug 2021 14:38)  T(F): 97.6 (28 Aug 2021 10:35), Max: 99.5 (27 Aug 2021 14:38)  HR: 81 (28 Aug 2021 08:17) (70 - 91)  BP: 140/73 (28 Aug 2021 08:17) (114/90 - 159/88)  BP(mean): 87 (27 Aug 2021 20:45) (67 - 102)  RR: 16 (28 Aug 2021 08:17) (11 - 19)  SpO2: 98% (28 Aug 2021 08:17) (94% - 100%)  I&O's Summary    27 Aug 2021 07:01  -  28 Aug 2021 07:00  --------------------------------------------------------  IN: 150 mL / OUT: 1615 mL / NET: -1465 mL                              11.7   10.40 )-----------( 144      ( 28 Aug 2021 06:42 )             37.5     08-28    140  |  104  |  16.2  ----------------------------<  144<H>  4.9   |  24.0  |  0.50    Ca    8.7      28 Aug 2021 06:42  Phos  3.4     08-28  Mg     2.1     08-28    TPro  7.2  /  Alb  3.5  /  TBili  0.5  /  DBili  x   /  AST  27  /  ALT  10  /  AlkPhos  66  08-28      MEDICATIONS  (STANDING):  acetaminophen   Tablet .. 975 milliGRAM(s) Oral every 8 hours  acetaminophen  IVPB .. 750 milliGRAM(s) IV Intermittent once  ceFAZolin   IVPB 2000 milliGRAM(s) IV Intermittent <User Schedule>  enoxaparin Injectable 40 milliGRAM(s) SubCutaneous daily  sodium chloride 0.9%. 1000 milliLiter(s) (75 mL/Hr) IV Continuous <Continuous>    MEDICATIONS  (PRN):  morphine  - Injectable 0.5 milliGRAM(s) IV Push once PRN Severe Pain (7 - 10)  oxyCODONE    IR 5 milliGRAM(s) Oral every 4 hours PRN Severe Pain (7 - 10)      Physical exam: Lying in bed in NAD, awake and alert.  Right lower extremity- The prox dressing is clean, dry and intact. The distal dressing is saturated with bloody dc through gauze. No wound erythema, discharge. Incision sites are intact. No active dc noted. Calf soft. No calf tenderness. Sensation to light touch is grossly intact distally. Motor function distally is 5/5. No foot drop. +DF/PF. 2+ dorsalis pedis pulse. Capillary refill is less than 2 seconds. No cyanosis.    Primary Orthopedic Assessment:  • S/P right hip IMN, POD#1    Plan:   - dsgs changed  • DVT prophylaxis- Lovenox  • Continue physical therapy  - WBAT  • Pain control as clinically indicated  • Incentive spirometry encouraged  • Medical/neuro management

## 2021-08-28 NOTE — PROGRESS NOTE ADULT - ASSESSMENT
88F with PMHX AFIB s/p mechanical fall admitted for R Hip Fracture and R Transverse Sinus Thrombosis       1.  Right  Femur Fracture s/p  Mechanical Fall  S/p nailing POD2   Dressing changed by ortho team   c/w pain management: Tylenol, Oxycodone and Morphine PRN   Add Miralax to prevent constipation   PT evaluation     2. . R Transverse Sinus Thrombosis  c/w Enoxaparin 40 mg cs daily     3. Chronic AFIB  -Apparently no longer on full dose DOAC/NOAC 2/2 GIB  Resume ASA 81mg PO   Resume Metoprolol ER 25 mg po daily       4. Macrocytic anemia - stable     5. DVT prophylaxis; on enoxaparin

## 2021-08-28 NOTE — PROGRESS NOTE ADULT - SUBJECTIVE AND OBJECTIVE BOX
Patient is a 88y old  Female who presents with a chief complaint of Mechanical Fall, Acute Hip Fracture, Sinus Thrombosis (28 Aug 2021 11:45)      INTERVAL HPI/OVERNIGHT EVENTS: seen and examined. c/o pain at the site of surgery. Not able to move the leg   Did not have a bowel movement in the past 2 days     MEDICATIONS  (STANDING):  acetaminophen   Tablet .. 975 milliGRAM(s) Oral every 8 hours  enoxaparin Injectable 40 milliGRAM(s) SubCutaneous daily  sodium chloride 0.9%. 1000 milliLiter(s) (75 mL/Hr) IV Continuous <Continuous>    MEDICATIONS  (PRN):  morphine  - Injectable 0.5 milliGRAM(s) IV Push once PRN Severe Pain (7 - 10)  oxyCODONE    IR 5 milliGRAM(s) Oral every 4 hours PRN Severe Pain (7 - 10)      Allergies    No Known Allergies    Intolerances        REVIEW OF SYSTEMS:  CONSTITUTIONAL: No fever, weight loss, or fatigue  RESPIRATORY: No cough, wheezing, chills or hemoptysis; No shortness of breath  CARDIOVASCULAR: No chest pain, palpitations, dizziness, or leg swelling  GASTROINTESTINAL: No abdominal or epigastric pain. No nausea, vomiting, or hematemesis; No diarrhea or constipation. No melena or hematochezia.  NEUROLOGICAL: No headaches, memory loss, loss of strength, numbness, or tremors  MUSCULOSKELETAL: No joint pain or swelling; No muscle, back, or extremity pain      Vital Signs Last 24 Hrs  T(C): 36.4 (28 Aug 2021 10:35), Max: 37.5 (27 Aug 2021 14:38)  T(F): 97.6 (28 Aug 2021 10:35), Max: 99.5 (27 Aug 2021 14:38)  HR: 81 (28 Aug 2021 08:17) (70 - 91)  BP: 140/73 (28 Aug 2021 08:17) (114/90 - 159/88)  BP(mean): 87 (27 Aug 2021 20:45) (67 - 102)  RR: 16 (28 Aug 2021 08:17) (11 - 19)  SpO2: 98% (28 Aug 2021 08:17) (94% - 100%)    PHYSICAL EXAM:  GENERAL: thin, fragile looking lady, laying in bed   HEAD:  temporal wasting   EYES: EOMI, PERRLA, conjunctiva and sclera clear  NECK: Supple, No JVD  NERVOUS SYSTEM:  Alert & Oriented X3, No gross focal deficits  CHEST/LUNG: Clear to percussion bilaterally; No rales, rhonchi, wheezing, or rubs  HEART: Regular rate and rhythm; No murmurs, rubs, or gallops  ABDOMEN: Soft, Nontender, Nondistended; Bowel sounds present  EXTREMITIES: Right LE swollen, dressing in place, tender to touch       LABS:                        11.7   10.40 )-----------( 144      ( 28 Aug 2021 06:42 )             37.5     08-28    140  |  104  |  16.2  ----------------------------<  144<H>  4.9   |  24.0  |  0.50    Ca    8.7      28 Aug 2021 06:42  Phos  3.4     08-28  Mg     2.1     08-28    TPro  7.2  /  Alb  3.5  /  TBili  0.5  /  DBili  x   /  AST  27  /  ALT  10  /  AlkPhos  66  08-28    PT/INR - ( 27 Aug 2021 07:44 )   PT: 11.1 sec;   INR: 0.95 ratio         PTT - ( 27 Aug 2021 07:44 )  PTT:25.4 sec    CAPILLARY BLOOD GLUCOSE          RADIOLOGY & ADDITIONAL TESTS:    Imaging Personally Reviewed:  [ ] YES  [ ] NO    Consultant(s) Notes Reviewed:  [ ] YES  [ ] NO    Care Discussed with Consultants/Other Providers [ ] YES  [ ] NO    Plan of Care discussed with Housestaff [ ]YES [ ] NO

## 2021-08-29 LAB
ANION GAP SERPL CALC-SCNC: 9 MMOL/L — SIGNIFICANT CHANGE UP (ref 5–17)
BASOPHILS # BLD AUTO: 0.02 K/UL — SIGNIFICANT CHANGE UP (ref 0–0.2)
BASOPHILS NFR BLD AUTO: 0.3 % — SIGNIFICANT CHANGE UP (ref 0–2)
BUN SERPL-MCNC: 15.5 MG/DL — SIGNIFICANT CHANGE UP (ref 8–20)
CALCIUM SERPL-MCNC: 8.3 MG/DL — LOW (ref 8.6–10.2)
CHLORIDE SERPL-SCNC: 102 MMOL/L — SIGNIFICANT CHANGE UP (ref 98–107)
CO2 SERPL-SCNC: 26 MMOL/L — SIGNIFICANT CHANGE UP (ref 22–29)
CREAT SERPL-MCNC: 0.46 MG/DL — LOW (ref 0.5–1.3)
EOSINOPHIL # BLD AUTO: 0.09 K/UL — SIGNIFICANT CHANGE UP (ref 0–0.5)
EOSINOPHIL NFR BLD AUTO: 1.2 % — SIGNIFICANT CHANGE UP (ref 0–6)
GLUCOSE SERPL-MCNC: 89 MG/DL — SIGNIFICANT CHANGE UP (ref 70–99)
HCT VFR BLD CALC: 34.1 % — LOW (ref 34.5–45)
HGB BLD-MCNC: 10.9 G/DL — LOW (ref 11.5–15.5)
IMM GRANULOCYTES NFR BLD AUTO: 0.4 % — SIGNIFICANT CHANGE UP (ref 0–1.5)
LYMPHOCYTES # BLD AUTO: 0.81 K/UL — LOW (ref 1–3.3)
LYMPHOCYTES # BLD AUTO: 10.5 % — LOW (ref 13–44)
MCHC RBC-ENTMCNC: 32 GM/DL — SIGNIFICANT CHANGE UP (ref 32–36)
MCHC RBC-ENTMCNC: 32.4 PG — SIGNIFICANT CHANGE UP (ref 27–34)
MCV RBC AUTO: 101.5 FL — HIGH (ref 80–100)
MONOCYTES # BLD AUTO: 0.57 K/UL — SIGNIFICANT CHANGE UP (ref 0–0.9)
MONOCYTES NFR BLD AUTO: 7.4 % — SIGNIFICANT CHANGE UP (ref 2–14)
NEUTROPHILS # BLD AUTO: 6.21 K/UL — SIGNIFICANT CHANGE UP (ref 1.8–7.4)
NEUTROPHILS NFR BLD AUTO: 80.2 % — HIGH (ref 43–77)
PLATELET # BLD AUTO: 164 K/UL — SIGNIFICANT CHANGE UP (ref 150–400)
POTASSIUM SERPL-MCNC: 3.9 MMOL/L — SIGNIFICANT CHANGE UP (ref 3.5–5.3)
POTASSIUM SERPL-SCNC: 3.9 MMOL/L — SIGNIFICANT CHANGE UP (ref 3.5–5.3)
RBC # BLD: 3.36 M/UL — LOW (ref 3.8–5.2)
RBC # FLD: 13.6 % — SIGNIFICANT CHANGE UP (ref 10.3–14.5)
SODIUM SERPL-SCNC: 137 MMOL/L — SIGNIFICANT CHANGE UP (ref 135–145)
WBC # BLD: 7.73 K/UL — SIGNIFICANT CHANGE UP (ref 3.8–10.5)
WBC # FLD AUTO: 7.73 K/UL — SIGNIFICANT CHANGE UP (ref 3.8–10.5)

## 2021-08-29 PROCEDURE — 99233 SBSQ HOSP IP/OBS HIGH 50: CPT

## 2021-08-29 RX ORDER — POLYETHYLENE GLYCOL 3350 17 G/17G
17 POWDER, FOR SOLUTION ORAL DAILY
Refills: 0 | Status: DISCONTINUED | OUTPATIENT
Start: 2021-08-29 | End: 2021-09-01

## 2021-08-29 RX ADMIN — OXYCODONE HYDROCHLORIDE 5 MILLIGRAM(S): 5 TABLET ORAL at 10:07

## 2021-08-29 RX ADMIN — OXYCODONE HYDROCHLORIDE 5 MILLIGRAM(S): 5 TABLET ORAL at 11:08

## 2021-08-29 RX ADMIN — Medication 81 MILLIGRAM(S): at 13:11

## 2021-08-29 RX ADMIN — ENOXAPARIN SODIUM 40 MILLIGRAM(S): 100 INJECTION SUBCUTANEOUS at 13:10

## 2021-08-29 RX ADMIN — Medication 975 MILLIGRAM(S): at 14:08

## 2021-08-29 RX ADMIN — Medication 975 MILLIGRAM(S): at 22:00

## 2021-08-29 RX ADMIN — Medication 975 MILLIGRAM(S): at 13:11

## 2021-08-29 RX ADMIN — POLYETHYLENE GLYCOL 3350 17 GRAM(S): 17 POWDER, FOR SOLUTION ORAL at 13:11

## 2021-08-29 RX ADMIN — Medication 975 MILLIGRAM(S): at 05:48

## 2021-08-29 RX ADMIN — Medication 25 MILLIGRAM(S): at 05:48

## 2021-08-29 RX ADMIN — Medication 975 MILLIGRAM(S): at 21:02

## 2021-08-29 RX ADMIN — Medication 975 MILLIGRAM(S): at 05:50

## 2021-08-29 NOTE — PHYSICAL THERAPY INITIAL EVALUATION ADULT - GENERAL OBSERVATIONS, REHAB EVAL
pt received semi wade in bed + reynaga + right VCB + IV lock + tele/ + BP cuff, A&OX3-4, NAD & willing to participate with PT.

## 2021-08-29 NOTE — OCCUPATIONAL THERAPY INITIAL EVALUATION ADULT - LIVES WITH, PROFILE
He helps his (their?) son a lot and isn't home.  It is not clear how much assist her spouse can provide./spouse

## 2021-08-29 NOTE — OCCUPATIONAL THERAPY INITIAL EVALUATION ADULT - PERSONAL SAFETY AND JUDGMENT, REHAB EVAL
pt fearful and impulsively goes to sit when standing with RW, this OT and PT Fabienne at bedside.  Pt/at risk behaviors demonstrated

## 2021-08-29 NOTE — OCCUPATIONAL THERAPY INITIAL EVALUATION ADULT - LEVEL OF INDEPENDENCE: BED TO CHAIR, REHAB EVAL
3 attempts to transfer to chair with assist x 2, but pt unable to tolerate due to pain, difficulty weightshifting.  Pt impulsively sits back on bed while standing./unable to perform

## 2021-08-29 NOTE — PHYSICAL THERAPY INITIAL EVALUATION ADULT - DIAGNOSIS, PT EVAL
pt demonstrates decreased functional mobility due to decreased strength, balance and pain to right hip.

## 2021-08-29 NOTE — PHYSICAL THERAPY INITIAL EVALUATION ADULT - PERTINENT HX OF CURRENT PROBLEM, REHAB EVAL
88F with PMHX AFIB s/p mechanical fall admitted for R Hip Fracture and R Transverse Sinus Thrombosis. pt is now s/p right hip IMN, POD # 2.

## 2021-08-29 NOTE — PHYSICAL THERAPY INITIAL EVALUATION ADULT - PHYSICAL ASSIST/NONPHYSICAL ASSIST: SIT/SUPINE, REHAB EVAL
due to increase in pain. BP taken post PT session and once return to bed 103/56 mmHg./2 person assist

## 2021-08-29 NOTE — PHYSICAL THERAPY INITIAL EVALUATION ADULT - MANUAL MUSCLE TESTING RESULTS, REHAB EVAL
ankle DF/PF 4+/5, grossly assessed left hip flex >=3/5 and left knee ext/flex 4/5. Unable to assess right hip flex and knee ext/flex due to increase in pain.

## 2021-08-29 NOTE — PHYSICAL THERAPY INITIAL EVALUATION ADULT - LEVEL OF INDEPENDENCE: SIT/STAND, REHAB EVAL
transfer attempted x 3. pt unable to complete full upright position, maintaining forward trunk/hip flexion and decreased weight bearing on right LE. pt reports 6/10 right hip pain. TATUM Wilson provided pt with pain medication after second attempt. pt also states minimal lightheadedness after second attempt /47 mmHg, seated rest break provided before attempting a third time./moderate assist (50% patients effort)

## 2021-08-29 NOTE — PHYSICAL THERAPY INITIAL EVALUATION ADULT - SITTING BALANCE: DYNAMIC
pt maintains a slight left lateral side lean in sitting due to pain to right hip with increased hip flexion./good balance

## 2021-08-29 NOTE — OCCUPATIONAL THERAPY INITIAL EVALUATION ADULT - FINE MOTOR COORDINATION, LEFT HAND, MANIPULATION OF OBJECTS, OT EVAL
mild shakiness bilateral hands, difficulty managing breakfast items while semifowler in bed./mild impairment

## 2021-08-29 NOTE — PROGRESS NOTE ADULT - SUBJECTIVE AND OBJECTIVE BOX
JANEY MAYRA    [id number]    History:  The patient is status post right hip IMN, POD # 2. Patient is doing well. The patient's pain is controlled using the prescribed pain medications. No new complaints. No acute motor or sensory changes are reported.    Vital Signs Last 24 Hrs  T(C): 36.7 (29 Aug 2021 04:00), Max: 36.7 (28 Aug 2021 20:00)  T(F): 98 (29 Aug 2021 04:00), Max: 98 (28 Aug 2021 20:00)  HR: 82 (29 Aug 2021 04:00) (77 - 91)  BP: 107/62 (29 Aug 2021 04:00) (96/66 - 124/54)  BP(mean): 72 (29 Aug 2021 04:00) (63 - 72)  RR: 16 (29 Aug 2021 04:00) (16 - 18)  SpO2: 97% (29 Aug 2021 04:00) (97% - 99%)  I&O's Summary    28 Aug 2021 07:01  -  29 Aug 2021 07:00  --------------------------------------------------------  IN: 600 mL / OUT: 1025 mL / NET: -425 mL                              10.9   7.73  )-----------( 164      ( 29 Aug 2021 09:04 )             34.1     08-29    137  |  102  |  15.5  ----------------------------<  89  3.9   |  26.0  |  0.46<L>    Ca    8.3<L>      29 Aug 2021 09:04  Phos  3.4     08-28  Mg     2.1     08-28    TPro  7.2  /  Alb  3.5  /  TBili  0.5  /  DBili  x   /  AST  27  /  ALT  10  /  AlkPhos  66  08-28      MEDICATIONS  (STANDING):  acetaminophen   Tablet .. 975 milliGRAM(s) Oral every 8 hours  aspirin  chewable 81 milliGRAM(s) Oral daily  diazepam  Injectable 1 milliGRAM(s) IV Push once  enoxaparin Injectable 40 milliGRAM(s) SubCutaneous daily  metoprolol succinate ER 25 milliGRAM(s) Oral daily  sodium chloride 0.9%. 1000 milliLiter(s) (75 mL/Hr) IV Continuous <Continuous>    MEDICATIONS  (PRN):  morphine  - Injectable 1 milliGRAM(s) IV Push once PRN Severe Pain (7 - 10)  morphine  - Injectable 0.5 milliGRAM(s) IV Push once PRN Severe Pain (7 - 10)  oxyCODONE    IR 5 milliGRAM(s) Oral every 4 hours PRN Severe Pain (7 - 10)      Physical exam: Lying in bed in NAD, awake and alert  Right lower extremity- The dressings are clean, dry and intact. No wound erythema, discharge, drainage is noted. Calf soft. No calf tenderness. Sensation to light touch is grossly intact distally. Motor function distally is 5/5. No foot drop. +EHL/FHL. +DF/PF. 2+ dorsalis pedis pulse. Capillary refill is less than 2 seconds. No cyanosis.    Primary Orthopedic Assessment:  • S/P right hip IMN, POD#2    Plan:   • DVT prophylaxis- Lovenox, including use of compression devices and ankle pumps  • Continue physical therapy  • WBAT  • Pain control as clinically indicated  • Incentive spirometry encouraged  • Discharge planning  - Medical/neuro team management

## 2021-08-29 NOTE — PROGRESS NOTE ADULT - SUBJECTIVE AND OBJECTIVE BOX
Patient is a 88y old  Female who presents with a chief complaint of Mechanical Fall, Acute Hip Fracture, Sinus Thrombosis (29 Aug 2021 09:53)      INTERVAL HPI/OVERNIGHT EVENTS: seen and examined. Reports feeling much better. Had no bowel movements for 3 days. Awaiting PT evaluation     MEDICATIONS  (STANDING):  acetaminophen   Tablet .. 975 milliGRAM(s) Oral every 8 hours  aspirin  chewable 81 milliGRAM(s) Oral daily  diazepam  Injectable 1 milliGRAM(s) IV Push once  enoxaparin Injectable 40 milliGRAM(s) SubCutaneous daily  metoprolol succinate ER 25 milliGRAM(s) Oral daily  polyethylene glycol 3350 17 Gram(s) Oral daily  sodium chloride 0.9%. 1000 milliLiter(s) (75 mL/Hr) IV Continuous <Continuous>    MEDICATIONS  (PRN):  morphine  - Injectable 1 milliGRAM(s) IV Push once PRN Severe Pain (7 - 10)  morphine  - Injectable 0.5 milliGRAM(s) IV Push once PRN Severe Pain (7 - 10)  oxyCODONE    IR 5 milliGRAM(s) Oral every 4 hours PRN Severe Pain (7 - 10)      Allergies    No Known Allergies    Intolerances        REVIEW OF SYSTEMS:  CONSTITUTIONAL: No fever, weight loss, or fatigue  RESPIRATORY: No cough, wheezing, chills or hemoptysis; No shortness of breath  CARDIOVASCULAR: No chest pain, palpitations, dizziness, or leg swelling  GASTROINTESTINAL: No abdominal or epigastric pain. No nausea, vomiting, or hematemesis; No diarrhea or constipation. No melena or hematochezia.  NEUROLOGICAL: No headaches, memory loss, loss of strength, numbness, or tremors  MUSCULOSKELETAL: No joint pain or swelling; No muscle, back, or extremity pain      Vital Signs Last 24 Hrs  T(C): 36.7 (29 Aug 2021 04:00), Max: 36.7 (28 Aug 2021 20:00)  T(F): 98 (29 Aug 2021 04:00), Max: 98 (28 Aug 2021 20:00)  HR: 82 (29 Aug 2021 04:00) (77 - 91)  BP: 107/62 (29 Aug 2021 04:00) (96/66 - 124/54)  BP(mean): 72 (29 Aug 2021 04:00) (63 - 72)  RR: 16 (29 Aug 2021 04:00) (16 - 18)  SpO2: 97% (29 Aug 2021 04:00) (97% - 99%)    PHYSICAL EXAM:  GENERAL: Thin, pleasant lady, sitting on the bed and eating   HEAD:  temporal wasting   EYES: EOMI, PERRLA, conjunctiva and sclera clear  NECK: Supple, No JVD  NERVOUS SYSTEM:  Alert & Oriented X3, No gross focal deficits  CHEST/LUNG: Clear to percussion bilaterally; No rales, rhonchi, wheezing, or rubs  HEART: Regular rate and rhythm; No murmurs, rubs, or gallops  ABDOMEN: Soft, Nontender, Nondistended; Bowel sounds present  EXTREMITIES: dressing in place right LE   SKIN: No rashes or lesions    LABS:                        10.9   7.73  )-----------( 164      ( 29 Aug 2021 09:04 )             34.1     08-29    137  |  102  |  15.5  ----------------------------<  89  3.9   |  26.0  |  0.46<L>    Ca    8.3<L>      29 Aug 2021 09:04  Phos  3.4     08-28  Mg     2.1     08-28    TPro  7.2  /  Alb  3.5  /  TBili  0.5  /  DBili  x   /  AST  27  /  ALT  10  /  AlkPhos  66  08-28        CAPILLARY BLOOD GLUCOSE          RADIOLOGY & ADDITIONAL TESTS:    Imaging Personally Reviewed:  [ ] YES  [ ] NO    Consultant(s) Notes Reviewed:  [ x] YES  [ ] NO    Care Discussed with Consultants/Other Providers [ ] YES  [ ] NO    Plan of Care discussed with Housestaff [ ]YES [ ] NO

## 2021-08-29 NOTE — PROGRESS NOTE ADULT - ASSESSMENT
88F with PMHX AFIB s/p mechanical fall admitted for R Hip Fracture and R Transverse Sinus Thrombosis       1.  Right  Femur Fracture s/p  Mechanical Fall  S/p nailing POD3   c/w pain management: Tylenol, Oxycodone and Morphine PRN   c/w  Miralax to prevent constipation   PT evaluation pending     2. . R Transverse Sinus Thrombosis  c/w Enoxaparin 40 mg cs daily   Awaiting MRI and Venogram     3. Chronic AFIB  -Apparently no longer on full dose DOAC/NOAC 2/2 GIB  Rate controlled on  Metoprolol ER 25 mg po daily   c/w  ASA 81mg PO     4. Macrocytic anemia - stable     5. DVT prophylaxis; on enoxaparin

## 2021-08-29 NOTE — OCCUPATIONAL THERAPY INITIAL EVALUATION ADULT - FINE MOTOR COORDINATION, RIGHT HAND, MANIPULATE OBJECTS, OT EVAL
mild shakiness bilateral hands, difficulty managing breakfast items while semifowler in bed/mild impairment

## 2021-08-29 NOTE — PHYSICAL THERAPY INITIAL EVALUATION ADULT - ACTIVE RANGE OF MOTION EXAMINATION, REHAB EVAL
PROM and AROM of right LE limited due to increase in pain with mobility. pt maintained right knee in extension due to increase in pain to thigh and hip with mobility./bilateral upper extremity Active ROM was WFL (within functional limits)/Left LE Active ROM was WFL (within functional limits)

## 2021-08-29 NOTE — PHYSICAL THERAPY INITIAL EVALUATION ADULT - ORIENTATION, REHAB EVAL
pt reports " nothing is coming to mind" when asked for current month and year. pt states "it is almost fall" and " its the end of a month." pt reoriented to the month of August. pt states "2020" when asked for the year./person/place/situation

## 2021-08-29 NOTE — PHYSICAL THERAPY INITIAL EVALUATION ADULT - PHYSICAL ASSIST/NONPHYSICAL ASSIST: SIT/STAND, REHAB EVAL
verbal cues to encourage breathing through transfer, right knee flex and weight bearing./2 person assist

## 2021-08-29 NOTE — PHYSICAL THERAPY INITIAL EVALUATION ADULT - LEVEL OF INDEPENDENCE: BED TO CHAIR, REHAB EVAL
attempted to perform transfer x 3; unable to complete due to increase in pain and fear of falling. Attempted to perform stand pivot/squat pivot transfer pt states not feeling comfortable and wishing to use RW./unable to perform

## 2021-08-29 NOTE — OCCUPATIONAL THERAPY INITIAL EVALUATION ADULT - GENERAL OBSERVATIONS, REHAB EVAL
Pt received semifowler in bed, +bilateral VCDs, +IV, +reynaga, +cardiac monitor with ,  agreeable to OT eval.

## 2021-08-29 NOTE — PHYSICAL THERAPY INITIAL EVALUATION ADULT - ADDITIONAL COMMENTS
pt reports she lives with her  in a house with a "couple" steps to enter with 2 handrails + 4 steps to bedroom and full bath. pt reports independence with all ADLs, use of SAC for stairs and out in the community and able to drive. pt reports she has a grab bar in the shower, shower chair but does not use it, raised toilet seat and RW. pt states she is home alone during the day as  goes out to assist his their son.

## 2021-08-29 NOTE — OCCUPATIONAL THERAPY INITIAL EVALUATION ADULT - ADDITIONAL COMMENTS
Verification of the following pt report appreciated from [facility].  Pt made a few inconsistent statements regarding her family.  It is not clear if she is a reliable historian.  Pt reports being independent with mobility (without a device) and self care prior to fall.  She states that she drives but her spouse doesn't, due to recent medical issues.  She reportedly does the food shopping and cooking.  She has a RW from previous need, and grab bar in the tub.  Again, verification of this information appreciated as pt's cognition appears mildly impaired at time of eval.

## 2021-08-29 NOTE — PHYSICAL THERAPY INITIAL EVALUATION ADULT - LEVEL OF INDEPENDENCE: SUPINE/SIT, REHAB EVAL
pt denies dizziness and lightheadedness with transfer. pt hesitant to flex knee in sitting position due to increase in pain. pt provided with verbal cues to encourage breathing throughout transfer./moderate assist (50% patients effort)

## 2021-08-30 PROCEDURE — 99233 SBSQ HOSP IP/OBS HIGH 50: CPT

## 2021-08-30 RX ORDER — LANOLIN ALCOHOL/MO/W.PET/CERES
3 CREAM (GRAM) TOPICAL ONCE
Refills: 0 | Status: COMPLETED | OUTPATIENT
Start: 2021-08-30 | End: 2021-08-30

## 2021-08-30 RX ORDER — LACTULOSE 10 G/15ML
15 SOLUTION ORAL DAILY
Refills: 0 | Status: DISCONTINUED | OUTPATIENT
Start: 2021-08-30 | End: 2021-09-01

## 2021-08-30 RX ADMIN — Medication 3 MILLIGRAM(S): at 23:59

## 2021-08-30 RX ADMIN — POLYETHYLENE GLYCOL 3350 17 GRAM(S): 17 POWDER, FOR SOLUTION ORAL at 08:00

## 2021-08-30 RX ADMIN — Medication 25 MILLIGRAM(S): at 05:07

## 2021-08-30 RX ADMIN — Medication 975 MILLIGRAM(S): at 18:14

## 2021-08-30 RX ADMIN — Medication 975 MILLIGRAM(S): at 06:00

## 2021-08-30 RX ADMIN — Medication 81 MILLIGRAM(S): at 12:25

## 2021-08-30 RX ADMIN — Medication 975 MILLIGRAM(S): at 05:08

## 2021-08-30 RX ADMIN — Medication 975 MILLIGRAM(S): at 18:16

## 2021-08-30 RX ADMIN — ENOXAPARIN SODIUM 40 MILLIGRAM(S): 100 INJECTION SUBCUTANEOUS at 12:25

## 2021-08-30 NOTE — PROGRESS NOTE ADULT - SUBJECTIVE AND OBJECTIVE BOX
ORTHOPEDIC POST-OP PROGRESS NOTE:    Name: JANEY NUNEZ    MR #: [id number]    Procedure: s/i Right hip IM nail, POD#3      Pt comfortable without complaints, pain controlled. Denies CP, SOB, N/V, numbness/tingling               Vital Signs Last 24 Hrs  T(C): 36.4 (08-30-21 @ 08:00), Max: 36.4 (08-30-21 @ 08:00)  T(F): 97.5 (08-30-21 @ 08:00), Max: 97.5 (08-30-21 @ 08:00)  HR: 70 (08-30-21 @ 08:00) (70 - 82)  BP: 121/51 (08-30-21 @ 08:00) (121/51 - 127/62)  BP(mean): 66 (08-30-21 @ 08:00) (66 - 66)  RR: 18 (08-30-21 @ 08:00) (18 - 18)  SpO2: 98% (08-30-21 @ 08:00) (98% - 98%)      General Exam:    General: Pt Alert and oriented, NAD, controlled pain.    Dressings C/D/I. No bleeding. No erythema.    Skin: No erythema. No wound dehiscence    Pulses: 2+ dorsalis pedis pulse. Cap refill < 2 sec.    Sensation: Grossly intact to light touch without deficit.    Motor: No motor weaknesses found.        A/P: 88y Female  POD# 3  right hip IM nail      - Stable  - Pain Control  - DVT ppx as prescribed, lovenox 40mg daily for 6 weeks  - PT eval  - Weight bearing status: WBAT RLE

## 2021-08-30 NOTE — PROGRESS NOTE ADULT - ASSESSMENT
88F with PMHX AFIB s/p mechanical fall admitted for R Hip Fracture and R Transverse Sinus Thrombosis       1.  Right  Femur Fracture s/p  Mechanical Fall  S/p nailing POD4   c/w pain management: Tylenol, Oxycodone and Morphine PRN   c/w  Miralax to prevent constipation   PT evaluation not complete. Awaiting a full PT evaluation      2. . R Transverse Sinus Thrombosis  c/w Enoxaparin 40 mg cs daily   Awaiting MRI and Venogram       3. Chronic AFIB  -Apparently no longer on full dose DOAC/NOAC 2/2 GIB  Rate controlled on  Metoprolol ER 25 mg po daily   c/w  ASA 81mg PO     4. Macrocytic anemia - stable     5. DVT prophylaxis; on enoxaparin

## 2021-08-30 NOTE — PROGRESS NOTE ADULT - SUBJECTIVE AND OBJECTIVE BOX
Patient is a 88y old  Female who presents with a chief complaint of Mechanical Fall, Acute Hip Fracture, Sinus Thrombosis (29 Aug 2021 10:06)      INTERVAL HPI/OVERNIGHT EVENTS: seen and examined. Pain better controlled. NO new complains     MEDICATIONS  (STANDING):  acetaminophen   Tablet .. 975 milliGRAM(s) Oral every 8 hours  aspirin  chewable 81 milliGRAM(s) Oral daily  diazepam  Injectable 1 milliGRAM(s) IV Push once  enoxaparin Injectable 40 milliGRAM(s) SubCutaneous daily  metoprolol succinate ER 25 milliGRAM(s) Oral daily  polyethylene glycol 3350 17 Gram(s) Oral daily  sodium chloride 0.9%. 1000 milliLiter(s) (75 mL/Hr) IV Continuous <Continuous>    MEDICATIONS  (PRN):  morphine  - Injectable 1 milliGRAM(s) IV Push once PRN Severe Pain (7 - 10)  morphine  - Injectable 0.5 milliGRAM(s) IV Push once PRN Severe Pain (7 - 10)  oxyCODONE    IR 5 milliGRAM(s) Oral every 4 hours PRN Severe Pain (7 - 10)      Allergies    No Known Allergies    Intolerances        REVIEW OF SYSTEMS:  CONSTITUTIONAL: No fever, weight loss, or fatigue  RESPIRATORY: No cough, wheezing, chills or hemoptysis; No shortness of breath  CARDIOVASCULAR: No chest pain, palpitations, dizziness, or leg swelling  GASTROINTESTINAL: No abdominal or epigastric pain. No nausea, vomiting, or hematemesis; No diarrhea or constipation. No melena or hematochezia.  NEUROLOGICAL: No headaches, memory loss, loss of strength, numbness, or tremors  MUSCULOSKELETAL: No joint pain or swelling; No muscle, back, or extremity pain      Vital Signs Last 24 Hrs  T(C): 36.4 (30 Aug 2021 08:00), Max: 36.7 (29 Aug 2021 20:00)  T(F): 97.5 (30 Aug 2021 08:00), Max: 98.1 (30 Aug 2021 04:00)  HR: 70 (30 Aug 2021 08:00) (70 - 101)  BP: 121/51 (30 Aug 2021 08:00) (90/54 - 127/62)  BP(mean): 66 (30 Aug 2021 08:00) (66 - 73)  RR: 18 (30 Aug 2021 08:00) (16 - 19)  SpO2: 98% (30 Aug 2021 08:00) (98% - 98%)    PHYSICAL EXAM:  GENERAL: Sitting on the bed and eating. NAD, well-groomed, well-developed  HEAD:  temporal wasting   EYES: EOMI, PERRLA, conjunctiva and sclera clear  NECK: Supple, No JVD,  NERVOUS SYSTEM:  Alert & Oriented X3, No gross focal deficits  CHEST/LUNG: Clear to percussion bilaterally; No rales, rhonchi, wheezing, or rubs  HEART: Regular rate and rhythm; No murmurs, rubs, or gallops  ABDOMEN: Soft, Nontender, Nondistended; Bowel sounds present  EXTREMITIES: right hip with dressing in place.  No clubbing, cyanosis, or edema  SKIN: No rashes or lesions    LABS:                        10.9   7.73  )-----------( 164      ( 29 Aug 2021 09:04 )             34.1     08-29    137  |  102  |  15.5  ----------------------------<  89  3.9   |  26.0  |  0.46<L>    Ca    8.3<L>      29 Aug 2021 09:04          CAPILLARY BLOOD GLUCOSE          RADIOLOGY & ADDITIONAL TESTS:    Imaging Personally Reviewed:  [ ] YES  [ ] NO    Consultant(s) Notes Reviewed:  [x ] YES  [ ] NO    Care Discussed with Consultants/Other Providers [ ] YES  [ ] NO    Plan of Care discussed with Housestaff [ ]YES [ ] NO

## 2021-08-31 PROCEDURE — 99222 1ST HOSP IP/OBS MODERATE 55: CPT

## 2021-08-31 PROCEDURE — 99232 SBSQ HOSP IP/OBS MODERATE 35: CPT

## 2021-08-31 PROCEDURE — 70551 MRI BRAIN STEM W/O DYE: CPT | Mod: 26

## 2021-08-31 PROCEDURE — 70544 MR ANGIOGRAPHY HEAD W/O DYE: CPT | Mod: 26,59

## 2021-08-31 RX ADMIN — Medication 25 MILLIGRAM(S): at 05:49

## 2021-08-31 RX ADMIN — OXYCODONE HYDROCHLORIDE 5 MILLIGRAM(S): 5 TABLET ORAL at 05:56

## 2021-08-31 RX ADMIN — Medication 975 MILLIGRAM(S): at 21:07

## 2021-08-31 RX ADMIN — POLYETHYLENE GLYCOL 3350 17 GRAM(S): 17 POWDER, FOR SOLUTION ORAL at 11:39

## 2021-08-31 RX ADMIN — Medication 81 MILLIGRAM(S): at 11:39

## 2021-08-31 RX ADMIN — Medication 975 MILLIGRAM(S): at 13:30

## 2021-08-31 RX ADMIN — Medication 975 MILLIGRAM(S): at 20:37

## 2021-08-31 RX ADMIN — Medication 975 MILLIGRAM(S): at 14:00

## 2021-08-31 RX ADMIN — ENOXAPARIN SODIUM 40 MILLIGRAM(S): 100 INJECTION SUBCUTANEOUS at 11:39

## 2021-08-31 RX ADMIN — LACTULOSE 15 GRAM(S): 10 SOLUTION ORAL at 11:39

## 2021-08-31 RX ADMIN — Medication 975 MILLIGRAM(S): at 05:49

## 2021-08-31 NOTE — PROGRESS NOTE ADULT - SUBJECTIVE AND OBJECTIVE BOX
HPI: 88F with PMHX HTN, AFIB, SHAHIDA, OA BIBEMS s/p mechanical fall when her hip gave out. Patient evaluated by Trauma Sx with scans done which were suspicious for transverse sinus thrombosis. CTV confirmed sinus thrombosis. RLE shortened and externally rotated. CT imaging +acute R hip fracture. Cleared by Trauma Sx. Ortho Sx plan for OR pending NeuroSx eval. Cleared by NeuroSx with plan for MRI and discussion of AC post-operatively. Pt seen/examined. Labs/imaging reviewed. Surgical reccs appreciated. Patient poor historian and really unable to provide HPI/ROS. Denies any pmhx or meds besides nasal spray which appears untrue. Does not know any meds/pharmacy info. Attempted to call emergency contact patients spouse overnight Arnold [phone] with no answer. Denies CP/SOB, F/C, N/V/D, abd pain, urinary complaints. ROS negative.  (27 Aug 2021 06:51)      INTERVAL OVERNIGHT EVENTS: 8/31/21  88y Female seen sitting  comfortably in bed. Tolerating diet. Denies headache, weakness, numbness, n/v/d, fevers, chills, chest pain, SOB.     Vital Signs Last 24 Hrs  T(C): 36.8 (31 Aug 2021 12:00), Max: 36.8 (31 Aug 2021 08:00)  T(F): 98.3 (31 Aug 2021 12:00), Max: 98.3 (31 Aug 2021 08:00)  HR: 77 (31 Aug 2021 12:00) (77 - 82)  BP: 124/69 (31 Aug 2021 12:00) (121/48 - 129/75)  BP(mean): 87 (31 Aug 2021 04:00) (67 - 87)  RR: 20 (31 Aug 2021 12:00) (18 - 20)  SpO2: 97% (31 Aug 2021 12:00) (97% - 98%)    PHYSICAL EXAM:  GENERAL: NAD, well-groomed, well-developed female  HEAD:  Atraumatic, normocephalic  MENTAL STATUS: AAO x3, Appropriately conversant without aphasia,  following simple commands  CRANIAL NERVES: EOMI without nystagmus. Facial sensation intact V1-3 distribution b/l. Face symmetric w/ normal eye closure and smile, tongue midline. Hearing grossly intact. Speech clear. Head turning and shoulder shrug intact.   MOTOR: strength 5/5 b/l upper and lower extremities        LABS:        08-30 @ 07:01  -  08-31 @ 07:00  --------------------------------------------------------  IN: 0 mL / OUT: 1945 mL / NET: -1945 mL    08-31 @ 07:01  -  08-31 @ 16:10  --------------------------------------------------------  IN: 0 mL / OUT: 670 mL / NET: -670 mL        RADIOLOGY & ADDITIONAL TESTS:    MR Venogram Head No Cont (08.31.21 @ 10:47)   The brain demonstrates moderate periventricular and bifrontal and LEFT parietal subcortical white matter ischemia. Acute thrombus was increased T1-weighted signal and lack of flow void on T2-weighted images is seen within the RIGHT transverse sinus.   No acute cerebral cortical infarct is found.   No intracranial hemorrhage is recognized. No mass effect is found in the brain.    The ventricles, sulci and basal cisterns show mild atrophy.    The vertebral and internal carotid arteries demonstrate expected flow voids indicating their patency.    The orbits are unremarkable.  The paranasal sinuses are clear.  The nasal cavity appears intact.  The nasopharynx is symmetric.  The central skull base and temporal bones are intact.  The calvarium appears unremarkable.    MR venogram  (Non gadolinium technique)    CLINICAL INFORMATION: Dural venous sinus thrombosis Intracranial mass.    TECHNIQUE:   Three-dimensional time of flight MR venogram was performed.  Post processing angiographic reconstruction of images was performed. This data set was reconstructed as maximum intensity pixel images and displayed in multiple rotations.    FINDINGS:   No prior examinations are available for review.    The superior sagittal sinus is patent in both its anterior and posterior limbs. Nonocclusive thrombus is seen within the RIGHT transverse sinus. The LEFT transverse and sigmoid sinuses are also patent.  There is asymmetry of the transverse sinuses that is within physiologic limits.  Jugular bulbs and internal jugular veins remain patent.        IMPRESSION:    MRI BRAIN: moderate periventricular and bifrontal and LEFT parietal subcortical white matter ischemia. Acute thrombus was increased T1-weighted signal and lack offlow void on T2-weighted images is seen within the RIGHT transverse sinus.    MRV brain:  Nonocclusive thrombus is seen within the RIGHT transverse sinus.            CAPRINI SCORE [CLOT]:  Patient has an estimated Caprini score of greater than 5.  However, the patient's unique clinical situation will be addressed in an individual manner to determine appropriate anticoagulation treatment, if any.

## 2021-08-31 NOTE — DIETITIAN INITIAL EVALUATION ADULT. - ORAL INTAKE PTA/DIET HISTORY
Pt reports decreased po intake at meals.  Pt states similar po intake prior to admission and is unsure about any recent wt loss.  Discussed importance of consuming adequate protein intake at meals to optimize nutrition status. Offered Ensure supplements and pt agreeable.

## 2021-08-31 NOTE — DIETITIAN NUTRITION RISK NOTIFICATION - TREATMENT: THE FOLLOWING DIET HAS BEEN RECOMMENDED
Diet, Regular (08-28-21 @ 17:04) [Active]  Diet, Clear Liquid (08-27-21 @ 18:48) [Available for Activation]

## 2021-08-31 NOTE — PROGRESS NOTE ADULT - SUBJECTIVE AND OBJECTIVE BOX
Patient is a 88y old  Female who presents with a chief complaint of Mechanical Fall, Acute Hip Fracture, Sinus Thrombosis (30 Aug 2021 10:46)      INTERVAL HPI/OVERNIGHT EVENTS: seen and examined. Had bowel movement.   Has concerns that not able to move the right LE   Awaiting MRI  and venogram     MEDICATIONS  (STANDING):  acetaminophen   Tablet .. 975 milliGRAM(s) Oral every 8 hours  aspirin  chewable 81 milliGRAM(s) Oral daily  diazepam  Injectable 1 milliGRAM(s) IV Push once  enoxaparin Injectable 40 milliGRAM(s) SubCutaneous daily  lactulose Syrup 15 Gram(s) Oral daily  metoprolol succinate ER 25 milliGRAM(s) Oral daily  polyethylene glycol 3350 17 Gram(s) Oral daily    MEDICATIONS  (PRN):  morphine  - Injectable 1 milliGRAM(s) IV Push once PRN Severe Pain (7 - 10)  morphine  - Injectable 0.5 milliGRAM(s) IV Push once PRN Severe Pain (7 - 10)  oxyCODONE    IR 5 milliGRAM(s) Oral every 4 hours PRN Severe Pain (7 - 10)      Allergies    No Known Allergies    Intolerances        REVIEW OF SYSTEMS:  CONSTITUTIONAL: No fever, weight loss, or fatigue  RESPIRATORY: No cough, wheezing, chills or hemoptysis; No shortness of breath  CARDIOVASCULAR: No chest pain, palpitations, dizziness, or leg swelling  GASTROINTESTINAL: No abdominal or epigastric pain. No nausea, vomiting, or hematemesis; No diarrhea or constipation. No melena or hematochezia.  NEUROLOGICAL: No headaches, memory loss, loss of strength, numbness, or tremors  MUSCULOSKELETAL: No joint pain or swelling; No muscle, back, or extremity pain      Vital Signs Last 24 Hrs  T(C): 36.8 (31 Aug 2021 08:00), Max: 36.8 (30 Aug 2021 16:00)  T(F): 98.3 (31 Aug 2021 08:00), Max: 98.3 (31 Aug 2021 08:00)  HR: 81 (31 Aug 2021 08:00) (75 - 82)  BP: 127/58 (31 Aug 2021 08:00) (100/57 - 129/75)  BP(mean): 87 (31 Aug 2021 04:00) (62 - 87)  RR: 18 (31 Aug 2021 08:00) (18 - 18)  SpO2: 97% (31 Aug 2021 08:00) (97% - 100%)    PHYSICAL EXAM:  GENERAL: thin, pleasant lady, sitting on the bed. NAD, well-groomed, well-developed  HEAD:  Atraumatic, Normocephalic  EYES: EOMI, PERRLA, conjunctiva and sclera clear  NECK: Supple, No JVD, Normal thyroid  NERVOUS SYSTEM:  Alert & Oriented X3, No gross focal deficits  CHEST/LUNG: Clear to percussion bilaterally; No rales, rhonchi, wheezing, or rubs  HEART: Regular rate and rhythm; No murmurs, rubs, or gallops  ABDOMEN: Soft, Nontender, Nondistended; Bowel sounds present  EXTREMITIES: right LE swollen at the hip side. Decrease range of motion       LABS:              CAPILLARY BLOOD GLUCOSE          RADIOLOGY & ADDITIONAL TESTS:    Imaging Personally Reviewed:  [ ] YES  [ ] NO    Consultant(s) Notes Reviewed:  [ ] YES  [ ] NO    Care Discussed with Consultants/Other Providers [ ] YES  [ ] NO    Plan of Care discussed with Housestaff [ ]YES [ ] NO

## 2021-08-31 NOTE — PROGRESS NOTE ADULT - ASSESSMENT
88F with PMHX AFIB s/p mechanical fall admitted for R Hip Fracture and R Transverse Sinus Thrombosis       1.  Right  Femur Fracture s/p  Mechanical Fall  S/p nailing POD5   c/w pain management: Tylenol, Oxycodone and Morphine PRN   c/w  Miralax to prevent constipation   PT evaluation done, recommended acute vs [facility]   P&R consult requested     d/c Jarrett and TOV     2. . R Transverse Sinus Thrombosis  c/w Enoxaparin 40 mg cs daily   MRI and Venogram today       3. Chronic AFIB  -Apparently no longer on full dose DOAC/NOAC 2/2 GIB  Rate controlled on  Metoprolol ER 25 mg po daily   c/w  ASA 81mg PO     4. Macrocytic anemia - stable     5. DVT prophylaxis; on enoxaparin

## 2021-08-31 NOTE — CONSULT NOTE ADULT - SUBJECTIVE AND OBJECTIVE BOX
Patient is a 88y old  Female who presents with a chief complaint of right hip pain   HPI: 87yo F w/ PMH of iron-deficiency anemia, osteoarthritis, and HTN who was BIBEMS with complaints of right leg pain, noted with obvious deformity, shortened and externally rotated.  Patient denies having fallen. No pain elsewhere. Reports that her hip gave out this AM. Reports also that her right leg pain began yesterday. Patient is poor historian, A&Ox3. CT/CTV brain shows right transverse sinus thrombosis.       PAST MEDICAL & SURGICAL HISTORY:  Hypertension    Osteoarthritis    Afib  controlled with Diltiazem    H/O total knee replacement, bilateral    H/O bladder repair surgery  2007      FAMILY HISTORY:  Family hx of colon cancer  sister      SOCIAL HISTORY:  Tobacco Use: Denies   EtOH use: Denies   Substance: Denies     Allergies    No Known Allergies    Intolerances      REVIEW OF SYSTEMS  Negative except as noted in HPI  CONSTITUTIONAL: No fever, weight loss, or fatigue  EYES: No eye pain, visual disturbances, or discharge  ENMT:  No difficulty hearing, tinnitus, vertigo; No sinus or throat pain  NECK: No pain or stiffness  BREASTS: No pain, masses, or nipple discharge  RESPIRATORY: No cough, wheezing, chills or hemoptysis; No shortness of breath  CARDIOVASCULAR: No chest pain, palpitations, dizziness, or leg swelling  GASTROINTESTINAL: No abdominal or epigastric pain. No nausea, vomiting, or hematemesis; No diarrhea or constipation. No melena or hematochezia.  GENITOURINARY: No dysuria, frequency, hematuria, or incontinence  NEUROLOGICAL: No headaches, memory loss, loss of strength, numbness, or tremors  SKIN: No itching, burning, rashes, or lesions   LYMPH NODES: No enlarged glands  ENDOCRINE: No heat or cold intolerance; No hair loss  MUSCULOSKELETAL: +right hip pain. No joint pain or swelling; No muscle or back pain  PSYCHIATRIC: No depression, anxiety, mood swings, or difficulty sleeping  HEME/LYMPH: No easy bruising, or bleeding gums  ALLERY AND IMMUNOLOGIC: No hives or eczema    HOME MEDICATIONS:  Home Medications:  ascorbic acid 500 mg oral tablet: 1 tab(s) orally once a day (11 Dec 2020 11:05)  aspirin 81 mg oral delayed release tablet: 1 tab(s) orally once a day (11 Dec 2020 11:05)  Multiple Vitamins oral tablet: 1 tab(s) orally once a day (11 Dec 2020 11:05)  pantoprazole 40 mg oral delayed release tablet: 1 tab(s) orally once a day (before a meal) (11 Dec 2020 13:15)  senna oral tablet: 2 tab(s) orally once a day (at bedtime), As needed, Constipation (11 Dec 2020 11:05)      MEDICATIONS:  Antibiotics:  ceFAZolin   IVPB 2000 milliGRAM(s) IV Intermittent once    Neuro:    Anticoagulation:    OTHER:    IVF:      Vital Signs Last 24 Hrs  T(C): 36.7 (26 Aug 2021 23:54), Max: 36.9 (26 Aug 2021 17:51)  T(F): 98.1 (26 Aug 2021 23:54), Max: 98.4 (26 Aug 2021 17:51)  HR: 84 (26 Aug 2021 23:54) (84 - 90)  BP: 159/74 (26 Aug 2021 23:54) (146/79 - 159/87)  BP(mean): --  RR: 18 (26 Aug 2021 23:54) (18 - 18)  SpO2: 96% (26 Aug 2021 23:54) (96% - 98%)      PHYSICAL EXAM:  GENERAL: NAD, well-groomed, well-developed  HEAD:  Atraumatic, normocephalic  EYES: Conjunctiva and sclera clear; corneal reflex intact  ENMT: No tonsillar erythema, exudates, or enlargement; moist mucous membranes, good dentition, no lesions  NECK: Supple, no JVD, normal thyroid  COLTON COMA SCORE: E-4 V-4 M-6 = 14  MENTAL STATUS: AAO x3 but not to situation; Awake; Opens eyes spontaneously; Appropriately conversant without aphasia but confused at times; following simple commands  CRANIAL NERVES: Visual acuity normal for age, visual fields full to confrontation, PERRL. EOMI without nystagmus. Facial sensation intact V1-3 distribution b/l. Face symmetric w/ normal eye closure and smile, tongue midline. Hearing grossly intact. Speech clear. Head turning and shoulder shrug intact.   REFLEXES: PERRL.    MOTOR: strength 5/5 b/l upper and left lower extremity, unable to assess RLE due to fracture   SENSATION: grossly intact to light touch all extremities    CHEST/LUNG: Clear to auscultation bilaterally; no rales, rhonchi, wheezing, or rubs  HEART: +S1/+S2; Regular rate and rhythm; no murmurs, rubs, or gallops  ABDOMEN: Soft, nontender, nondistended; bowel sounds present all four quadrants  EXTREMITIES:  2+ peripheral pulses, no clubbing, cyanosis, or edema. RLE shortened and external rotated   LYMPH: No lymphadenopathy noted  SKIN: Warm, dry; no rashes or lesions    LABS:                        13.3   11.68 )-----------( 218      ( 26 Aug 2021 20:04 )             40.4     08-26    140  |  102  |  14.2  ----------------------------<  150<H>  4.0   |  25.0  |  0.64    Ca    9.7      26 Aug 2021 20:04    TPro  7.9  /  Alb  4.1  /  TBili  0.8  /  DBili  x   /  AST  28  /  ALT  11  /  AlkPhos  80  08-26    PT/INR - ( 26 Aug 2021 20:04 )   PT: 11.3 sec;   INR: 0.97 ratio         PTT - ( 26 Aug 2021 20:04 )  PTT:25.0 sec      CULTURES:      RADIOLOGY & ADDITIONAL STUDIES:  < from: CT Angio Head w/ IV Cont (08.27.21 @ 01:25) >  IMPRESSION: Thrombus noted in the right transverse sinus, correlating with the CT finding dated 8/26/2021.    < end of copied text >      CAPRINI SCORE [CLOT]:  Patient has an estimated Caprini score of greater than 5.  However, the patient's unique clinical situation will be addressed in an individual manner to determine appropriate anticoagulation treatment, if any.
Pt Name: JANEY NUNEZ    MRN: [id number]    Patient is a 88y Female presenting to the emergency department with a chief complaint of right hip pain. Pt is poor historian, unable to provide history - pt unsure of how she ended up with hip fracture. Pt denies any numbness or tingling. Pt has no other orthopedic complaints. Pt has h/o bilateral knee replacements "many many years ago". Right femur ORIF done by Dr García in 2016. As per ED, patient only takes baby asa daily, no longer on eliquis as per chart - rate controlled AFib on cardizem. Head CT finding of venous sinus thrombosis currently being worked up by ED. Pt will be admitted to medicine service.     PAST MEDICAL & SURGICAL HISTORY:  PAST MEDICAL & SURGICAL HISTORY:  Hypertension    Osteoarthritis    Afib  controlled with Diltiazem    H/O total knee replacement, bilateral    H/O bladder repair surgery  2007    Allergies: No Known Allergies      Medications:     FAMILY HISTORY:  Family hx of colon cancer  sister    : non-contributory    Social History:                13.3   11.68 )-----------( 218      ( 26 Aug 2021 20:04 )             40.4       08-26    140  |  102  |  14.2  ----------------------------<  150<H>  4.0   |  25.0  |  0.64    Ca    9.7      26 Aug 2021 20:04    TPro  7.9  /  Alb  4.1  /  TBili  0.8  /  DBili  x   /  AST  28  /  ALT  11  /  AlkPhos  80  08-26      Vital Signs Last 24 Hrs  T(C): 36.9 (26 Aug 2021 17:51), Max: 36.9 (26 Aug 2021 17:51)  T(F): 98.4 (26 Aug 2021 17:51), Max: 98.4 (26 Aug 2021 17:51)  HR: 90 (26 Aug 2021 17:51) (90 - 90)  BP: 159/87 (26 Aug 2021 17:51) (159/87 - 159/87)  BP(mean): --  RR: 18 (26 Aug 2021 17:51) (18 - 18)  SpO2: 97% (26 Aug 2021 17:51) (97% - 97%)    Daily Height in cm: 165.1 (26 Aug 2021 17:51)    Daily     PHYSICAL EXAM:    Appearance: Alert, responsive, in no acute distress.  RLE: short and externally rotated, skin intact without any bleeding, +hip pain, no knee pain, +plantardorsiflexion, SILT, DP2+, BCR, no cyanosis    Imaging Studies:    A/P:  Pt is a  88y Female with found to have right hip IT fracture    PLAN:   * Admit to medicine/trauma  * CT scan right hip and femur  * Pain Control  * Bedrest  * Patient will require OR for definitive fixation when optimized  * NPO after midnight
  TRAUMA SERVICE - CONSULT NOTE  --------------------------------------------------------------------------------------------    TRAUMA ACTIVATION LEVEL: Consult    MECHANISM OF INJURY: Unclear     [] Blunt  	[] MVC	[] Fall	[] Pedestrian Struck	[] Motorcycle accident      [] Penetrating  	[] Gun Shot Wound 		[] Stab Wound    GCS: 15 	E: 4	V: 5	M: 6      HPI: 87yo F w/ PMH of iron-deficiency anemia, osteoarthritis, and HTN who was BIBEMS with complaints of right leg pain, noted with obvious deformity, shortened and externally rotated.  Patient denies having fallen. No pain elsewhere. Reports that her hip gave out this AM. Reports also that her right leg pain began yesterday. Patient is poor historian, A&Ox2, but answering questions appropriately. Attempted to reach sone and  with no answer. Has headache. Denies nausea, vomiting, fever, chills, chest pain, or SOB.     Primary Survey:   A - airway intact, speaking  B - unlabored, equal chest rise  C - initial BP  BP: 159/87 (08-26-21 @ 17:51) *** , HR HR: 90 (08-26-21 @ 17:51) *** , palpable pulses in all extremities  D - GCS 15 on arrival      ROS: 10-system review is otherwise negative except HPI above.      PAST MEDICAL & SURGICAL HISTORY:  Hypertension    Osteoarthritis    Afib  controlled with Diltiazem ???    H/O total knee replacement, bilateral    H/O bladder repair surgery  2007      FAMILY HISTORY:  Family hx of colon cancer  sister      [] Family history not pertinent as reviewed with the patient and family    SOCIAL HISTORY: Denies smoking or use of illicit drugs. Occasional alcohol consumption.    ALLERGIES: No Known Allergies      HOME MEDICATIONS:     CURRENT MEDICATIONS  MEDICATIONS (STANDING):   MEDICATIONS (PRN):  --------------------------------------------------------------------------------------------    Vitals:   T(C): 36.9 (08-26-21 @ 17:51), Max: 36.9 (08-26-21 @ 17:51)  HR: 90 (08-26-21 @ 17:51) (90 - 90)  BP: 159/87 (08-26-21 @ 17:51) (159/87 - 159/87)  RR: 18 (08-26-21 @ 17:51) (18 - 18)  SpO2: 97% (08-26-21 @ 17:51) (97% - 97%)  CAPILLARY BLOOD GLUCOSE        CAPILLARY BLOOD GLUCOSE          Height (cm): 165.1 (08-26 @ 17:51)  Weight (kg): 49.9 (08-26 @ 17:51)  BMI (kg/m2): 18.3 (08-26 @ 17:51)  BSA (m2): 1.53 (08-26 @ 17:51)    PHYSICAL EXAM:  General: NAD, laying in bed, follows commands, answers appropriately  HEENT: Normocephalic, atraumatic, EOMI, PEERLA.  Neck: Soft, midline trachea.  Chest: No chest wall tenderness.   Cardiac: S1, S2, RRR  Respiratory: Unlabored, equal chest rise.   Abdomen: Soft, non-distended, non-tender  Groin: Normal appearing  Ext: Brisk cap refil, motor and sensation intact. Osteoarthritic changes  Back: no TTP, no palpable runoff/stepoff/deformity    --------------------------------------------------------------------------------------------    LABS  CBC (08-26 @ 20:04)                              13.3                           11.68<H>  )----------------(  218        96.5<H>% Neutrophils, 0.9<L>% Lymphocytes, ANC: 11.27<H>                              40.4      BMP (08-26 @ 20:04)             140     |  102     |  14.2  		Ca++ --      Ca 9.7                ---------------------------------( 150<H>		Mg --                 4.0     |  25.0    |  0.64  			Ph --        LFTs (08-26 @ 20:04)      TPro 7.9 / Alb 4.1 / TBili 0.8 / DBili -- / AST 28 / ALT 11 / AlkPhos 80    Coags (08-26 @ 20:04)  aPTT 25.0<L> / INR 0.97 / PT 11.3    --------------------------------------------------------------------------------------------    IMAGING  CT Head / C-Spine:  IMPRESSION:  BRAIN:  No intracranial mass effect or hemorrhage.    Hyperdensity within the right transverse sinus. The possibility of venous sinus thrombosis is raised. CT venography is advised for further evaluation if not clinically contraindicated.    CERVICAL SPINE:  No fracture or traumatic subluxation.    Multilevel spondylosis.    Findings were discussed with Physician: AMELIA BALLARD on 8/26/2021 8:42 PM with read back.    OFELIA GONZALEZ MD; Attending Radiologist  This document has been electronically signed. Aug 26 2021 8:45PM      Pelvic / R Femur X-Ray:  Intertrochanteric Frx, hardware noted. Pending final read      CXR:  No pneumo/hemothorax. Pending final read  
88yF was admitted on 08-27    Patient is a 88y old  Female who presents with a chief complaint of Mechanical Fall, Acute Hip Fracture, Sinus Thrombosis (31 Aug 2021 11:02)    HPI:  Ms. Cai is an 88 year old Female with a past medical history of HTN, AFIB, SHAHIDA, OA BIBEMS s/p mechanical fall when her hip gave out. Patient evaluated by Trauma surgery with scans done which were suspicious for transverse sinus thrombosis. CTV confirmed sinus thrombosis. RLE shortened and externally rotated. CT imaging demonstrated acute right hip fracture.  She underwent hip IM nail.  Today she reports she is having difficulty with her mobility.  She also thinks that therapy has been hard for her and she does not have the strength to tolerate prolonged periods of exercise.  She is still motivated to perform rehabilitation, but she would prefer a less intensive course for now.      Imaging reviewed showed:     EXAM:  MR VENOGRAM BRAIN                         EXAM:  MR BRAIN                          PROCEDURE DATE:  08/31/2021      IMPRESSION:    MRI BRAIN: moderate periventricular and bifrontal and LEFT parietal subcortical white matter ischemia. Acute thrombus was increased T1-weighted signal and lack of flow void on T2-weighted images is seen within the RIGHT transverse sinus.    MRV brain:  Nonocclusive thrombus is seen within the RIGHT transverse sinus.    REVIEW OF SYSTEMS  Constitutional - No fever, No weight loss, No fatigue  HEENT - No eye pain, No visual disturbances, No difficulty hearing, No tinnitus, No vertigo, No neck pain  Respiratory - No cough, No wheezing, No shortness of breath  Cardiovascular - No chest pain, No palpitations  Gastrointestinal - No abdominal pain, No nausea, No vomiting, No diarrhea, No constipation  Genitourinary - No dysuria, No frequency, No hematuria, No incontinence  Neurological - No headaches, No memory loss, + loss of strength, No numbness, No tremors  Skin - No itching, No rashes, No lesions   Endocrine - No temperature intolerance  Musculoskeletal - + joint pain, No joint swelling, No muscle pain  Psychiatric - No depression, No anxiety    VITALS  T(C): 36.8 (08-31-21 @ 08:00), Max: 36.8 (08-30-21 @ 16:00)  HR: 81 (08-31-21 @ 08:00) (75 - 82)  BP: 127/58 (08-31-21 @ 08:00) (100/57 - 129/75)  RR: 18 (08-31-21 @ 08:00) (18 - 18)  SpO2: 97% (08-31-21 @ 08:00) (97% - 100%)  Wt(kg): --    PAST MEDICAL & SURGICAL HISTORY  Hypertension    Osteoarthritis    Afib    H/O total knee replacement, bilateral    H/O bladder repair surgery        SOCIAL HISTORY - as per documentation/history  Smoking - None  EtOH - None  Drugs - None    FUNCTIONAL HISTORY  Lives with her .  4 steps to enter her home.  Previously independent with use of single point cane occasionally.      CURRENT FUNCTIONAL STATUS    Bed Mobility: Sit to Supine:     · Level of [place]	maximum assist (25% patients effort)  · Physical Assist/Nonphysical Assist	2 person assist; due to increase in pain. BP taken post PT session and once return to bed 103/56 mmHg.    Bed Mobility: Supine to Sit:     · Level of [place]	moderate assist (50% patients effort); pt denies dizziness and lightheadedness with transfer. pt hesitant to flex knee in sitting position due to increase in pain. pt provided with verbal cues to encourage breathing throughout transfer.  · Physical Assist/Nonphysical Assist	1 person assist  · Assistive Device	bed rails    Bed Mobility Analysis:     · Bed Mobility Limitations	decreased ability to use legs for bridging/pushing  · Impairments Contributing to Impaired Bed Mobility	pain; decreased strength    Transfer: Bed to Chair:     Transfer Skill: Bed to Chair   · Level of [place]	unable to perform; attempted to perform transfer x 3; unable to complete due to increase in pain and fear of falling. Attempted to perform stand pivot/squat pivot transfer pt states not feeling comfortable and wishing to use RW.    Transfer: Sit to Stand:     · Level of [place]	moderate assist (50% patients effort); transfer attempted x 3. pt unable to complete full upright position, maintaining forward trunk/hip flexion and decreased weight bearing on right LE. pt reports 6/10 right hip pain. TATUM Wilson provided pt with pain medication after second attempt. pt also states minimal lightheadedness after second attempt /47 mmHg, seated rest break provided before attempting a third time.  · Physical Assist/Nonphysical Assist	2 person assist; verbal cues to encourage breathing through transfer, right knee flex and weight bearing.      FAMILY HISTORY   No significant family history    Family hx of colon cancer    RECENT LABS - Reviewed    ALLERGIES  No Known Allergies      MEDICATIONS   acetaminophen   Tablet .. 975 milliGRAM(s) Oral every 8 hours  aspirin  chewable 81 milliGRAM(s) Oral daily  diazepam  Injectable 1 milliGRAM(s) IV Push once  enoxaparin Injectable 40 milliGRAM(s) SubCutaneous daily  lactulose Syrup 15 Gram(s) Oral daily  metoprolol succinate ER 25 milliGRAM(s) Oral daily  morphine  - Injectable 1 milliGRAM(s) IV Push once PRN  morphine  - Injectable 0.5 milliGRAM(s) IV Push once PRN  oxyCODONE    IR 5 milliGRAM(s) Oral every 4 hours PRN  polyethylene glycol 3350 17 Gram(s) Oral daily      ----------------------------------------------------------------------------------------  PHYSICAL EXAM  Constitutional - NAD, Comfortable  HEENT - NCAT, EOMI  Neck - Supple, No limited ROM  Chest - Breathing comfortably, No wheezing  Cardiovascular - S1S2   Abdomen - Soft   Extremities - No C/C/E, No calf tenderness   Neurologic Exam -                    Cognitive - Awake, Alert, AAO to self, place, date, year, situation     Communication - Fluent, No dysarthria     Cranial Nerves - CN 2-12 intact     Motor - 4/5 in B/L UE.  4/5 in LLE.  1/5 in right hip flexion, knee extension, 4/5 in ankle dorsi-plantar flexion.         Sensory - Intact to LT     Reflexes - No clonus   Psychiatric - Mood stable, Affect WNL  ----------------------------------------------------------------------------------------  ASSESSMENT/PLAN  88yFemale with functional deficits after right hip fracture.    Right hip fracture-S/P IM nail, WBAT per orthopedics   Pain - Tylenol, oxycodone  DVT PPX - Lovenox  Transverse Sinus Thrombosis: Lovenox, appreciate neurosurgery recommendations  Rehab - Continue PT/OT.  She would likely benefit from continued rehabilitation at the subacute level with continued PT/OT.       Will continue to follow. Functional progress will determine ongoing rehab dispo recommendations, which may change.    Continue bedside therapy as well as OOB throughout the day with mobilization by staff to maintain cardiopulmonary function and prevention of secondary complications related to debility.

## 2021-08-31 NOTE — PROGRESS NOTE ADULT - ASSESSMENT
89 y/o female s/p fall at home, right hip fx s/p right IM nail, now POD #4. Initial workup on CTB demonstrated a right transverse thrombosis.     1. MRI/MRV both consistent with right transverse thrombosis  2. Cont ASA 81 mg  3. On lovenox 40 Daily, recommend to increase lovenox 40 to BID on Wednesday 9/1/21  4. Dispo to rehab pending  5. No acute neurosurgical intervention needed at this time.

## 2021-08-31 NOTE — DIETITIAN INITIAL EVALUATION ADULT. - ETIOLOGY
related to inability to consume sufficient protein energy intake with persistent poor appetite in setting of advanced age and now s/p mechanical fall

## 2021-09-01 ENCOUNTER — TRANSCRIPTION ENCOUNTER (OUTPATIENT)
Age: 86
End: 2021-09-01

## 2021-09-01 VITALS
HEART RATE: 82 BPM | DIASTOLIC BLOOD PRESSURE: 61 MMHG | RESPIRATION RATE: 18 BRPM | TEMPERATURE: 98 F | OXYGEN SATURATION: 98 % | SYSTOLIC BLOOD PRESSURE: 107 MMHG

## 2021-09-01 LAB
ANION GAP SERPL CALC-SCNC: 10 MMOL/L — SIGNIFICANT CHANGE UP (ref 5–17)
BASOPHILS # BLD AUTO: 0.03 K/UL — SIGNIFICANT CHANGE UP (ref 0–0.2)
BASOPHILS NFR BLD AUTO: 0.3 % — SIGNIFICANT CHANGE UP (ref 0–2)
BUN SERPL-MCNC: 14.1 MG/DL — SIGNIFICANT CHANGE UP (ref 8–20)
CALCIUM SERPL-MCNC: 8.6 MG/DL — SIGNIFICANT CHANGE UP (ref 8.6–10.2)
CHLORIDE SERPL-SCNC: 100 MMOL/L — SIGNIFICANT CHANGE UP (ref 98–107)
CO2 SERPL-SCNC: 28 MMOL/L — SIGNIFICANT CHANGE UP (ref 22–29)
CREAT SERPL-MCNC: 0.43 MG/DL — LOW (ref 0.5–1.3)
EOSINOPHIL # BLD AUTO: 0.17 K/UL — SIGNIFICANT CHANGE UP (ref 0–0.5)
EOSINOPHIL NFR BLD AUTO: 1.9 % — SIGNIFICANT CHANGE UP (ref 0–6)
GLUCOSE SERPL-MCNC: 97 MG/DL — SIGNIFICANT CHANGE UP (ref 70–99)
HCT VFR BLD CALC: 33.1 % — LOW (ref 34.5–45)
HGB BLD-MCNC: 10.9 G/DL — LOW (ref 11.5–15.5)
IMM GRANULOCYTES NFR BLD AUTO: 0.5 % — SIGNIFICANT CHANGE UP (ref 0–1.5)
LYMPHOCYTES # BLD AUTO: 0.86 K/UL — LOW (ref 1–3.3)
LYMPHOCYTES # BLD AUTO: 9.4 % — LOW (ref 13–44)
MAGNESIUM SERPL-MCNC: 2 MG/DL — SIGNIFICANT CHANGE UP (ref 1.6–2.6)
MCHC RBC-ENTMCNC: 32.8 PG — SIGNIFICANT CHANGE UP (ref 27–34)
MCHC RBC-ENTMCNC: 32.9 GM/DL — SIGNIFICANT CHANGE UP (ref 32–36)
MCV RBC AUTO: 99.7 FL — SIGNIFICANT CHANGE UP (ref 80–100)
MONOCYTES # BLD AUTO: 0.62 K/UL — SIGNIFICANT CHANGE UP (ref 0–0.9)
MONOCYTES NFR BLD AUTO: 6.8 % — SIGNIFICANT CHANGE UP (ref 2–14)
NEUTROPHILS # BLD AUTO: 7.41 K/UL — HIGH (ref 1.8–7.4)
NEUTROPHILS NFR BLD AUTO: 81.1 % — HIGH (ref 43–77)
PLATELET # BLD AUTO: 220 K/UL — SIGNIFICANT CHANGE UP (ref 150–400)
POTASSIUM SERPL-MCNC: 4.2 MMOL/L — SIGNIFICANT CHANGE UP (ref 3.5–5.3)
POTASSIUM SERPL-SCNC: 4.2 MMOL/L — SIGNIFICANT CHANGE UP (ref 3.5–5.3)
RBC # BLD: 3.32 M/UL — LOW (ref 3.8–5.2)
RBC # FLD: 13.5 % — SIGNIFICANT CHANGE UP (ref 10.3–14.5)
SARS-COV-2 RNA SPEC QL NAA+PROBE: SIGNIFICANT CHANGE UP
SODIUM SERPL-SCNC: 137 MMOL/L — SIGNIFICANT CHANGE UP (ref 135–145)
WBC # BLD: 9.14 K/UL — SIGNIFICANT CHANGE UP (ref 3.8–10.5)
WBC # FLD AUTO: 9.14 K/UL — SIGNIFICANT CHANGE UP (ref 3.8–10.5)

## 2021-09-01 PROCEDURE — 84100 ASSAY OF PHOSPHORUS: CPT

## 2021-09-01 PROCEDURE — 86900 BLOOD TYPING SEROLOGIC ABO: CPT

## 2021-09-01 PROCEDURE — U0003: CPT

## 2021-09-01 PROCEDURE — 73700 CT LOWER EXTREMITY W/O DYE: CPT | Mod: MG

## 2021-09-01 PROCEDURE — 85025 COMPLETE CBC W/AUTO DIFF WBC: CPT

## 2021-09-01 PROCEDURE — 96374 THER/PROPH/DIAG INJ IV PUSH: CPT

## 2021-09-01 PROCEDURE — 97163 PT EVAL HIGH COMPLEX 45 MIN: CPT

## 2021-09-01 PROCEDURE — 85730 THROMBOPLASTIN TIME PARTIAL: CPT

## 2021-09-01 PROCEDURE — G1004: CPT

## 2021-09-01 PROCEDURE — 86850 RBC ANTIBODY SCREEN: CPT

## 2021-09-01 PROCEDURE — 80053 COMPREHEN METABOLIC PANEL: CPT

## 2021-09-01 PROCEDURE — 99285 EMERGENCY DEPT VISIT HI MDM: CPT

## 2021-09-01 PROCEDURE — C1713: CPT

## 2021-09-01 PROCEDURE — 97530 THERAPEUTIC ACTIVITIES: CPT

## 2021-09-01 PROCEDURE — 83735 ASSAY OF MAGNESIUM: CPT

## 2021-09-01 PROCEDURE — 70496 CT ANGIOGRAPHY HEAD: CPT | Mod: MG

## 2021-09-01 PROCEDURE — 73552 X-RAY EXAM OF FEMUR 2/>: CPT

## 2021-09-01 PROCEDURE — 70544 MR ANGIOGRAPHY HEAD W/O DYE: CPT

## 2021-09-01 PROCEDURE — 86769 SARS-COV-2 COVID-19 ANTIBODY: CPT

## 2021-09-01 PROCEDURE — U0005: CPT

## 2021-09-01 PROCEDURE — 72192 CT PELVIS W/O DYE: CPT | Mod: MG

## 2021-09-01 PROCEDURE — 86901 BLOOD TYPING SEROLOGIC RH(D): CPT

## 2021-09-01 PROCEDURE — 97535 SELF CARE MNGMENT TRAINING: CPT

## 2021-09-01 PROCEDURE — 96375 TX/PRO/DX INJ NEW DRUG ADDON: CPT

## 2021-09-01 PROCEDURE — 97167 OT EVAL HIGH COMPLEX 60 MIN: CPT

## 2021-09-01 PROCEDURE — 73502 X-RAY EXAM HIP UNI 2-3 VIEWS: CPT

## 2021-09-01 PROCEDURE — 99233 SBSQ HOSP IP/OBS HIGH 50: CPT

## 2021-09-01 PROCEDURE — 72125 CT NECK SPINE W/O DYE: CPT

## 2021-09-01 PROCEDURE — 99232 SBSQ HOSP IP/OBS MODERATE 35: CPT

## 2021-09-01 PROCEDURE — 71045 X-RAY EXAM CHEST 1 VIEW: CPT

## 2021-09-01 PROCEDURE — 85610 PROTHROMBIN TIME: CPT

## 2021-09-01 PROCEDURE — 73501 X-RAY EXAM HIP UNI 1 VIEW: CPT

## 2021-09-01 PROCEDURE — 76000 FLUOROSCOPY <1 HR PHYS/QHP: CPT

## 2021-09-01 PROCEDURE — 70551 MRI BRAIN STEM W/O DYE: CPT

## 2021-09-01 PROCEDURE — 70450 CT HEAD/BRAIN W/O DYE: CPT

## 2021-09-01 PROCEDURE — 80048 BASIC METABOLIC PNL TOTAL CA: CPT

## 2021-09-01 PROCEDURE — 36415 COLL VENOUS BLD VENIPUNCTURE: CPT

## 2021-09-01 PROCEDURE — C1769: CPT

## 2021-09-01 PROCEDURE — 99239 HOSP IP/OBS DSCHRG MGMT >30: CPT

## 2021-09-01 RX ORDER — ACETAMINOPHEN 500 MG
3 TABLET ORAL
Qty: 0 | Refills: 0 | DISCHARGE
Start: 2021-09-01

## 2021-09-01 RX ORDER — METOPROLOL TARTRATE 50 MG
1 TABLET ORAL
Qty: 0 | Refills: 0 | DISCHARGE

## 2021-09-01 RX ORDER — ENOXAPARIN SODIUM 100 MG/ML
40 INJECTION SUBCUTANEOUS
Qty: 0 | Refills: 0 | DISCHARGE
Start: 2021-09-01

## 2021-09-01 RX ORDER — POLYETHYLENE GLYCOL 3350 17 G/17G
17 POWDER, FOR SOLUTION ORAL
Qty: 0 | Refills: 0 | DISCHARGE
Start: 2021-09-01

## 2021-09-01 RX ORDER — OXYCODONE HYDROCHLORIDE 5 MG/1
1 TABLET ORAL
Qty: 0 | Refills: 0 | DISCHARGE
Start: 2021-09-01

## 2021-09-01 RX ORDER — METOPROLOL TARTRATE 50 MG
1 TABLET ORAL
Qty: 0 | Refills: 0 | DISCHARGE
Start: 2021-09-01

## 2021-09-01 RX ADMIN — Medication 975 MILLIGRAM(S): at 06:20

## 2021-09-01 RX ADMIN — Medication 975 MILLIGRAM(S): at 13:43

## 2021-09-01 RX ADMIN — Medication 975 MILLIGRAM(S): at 14:30

## 2021-09-01 RX ADMIN — ENOXAPARIN SODIUM 40 MILLIGRAM(S): 100 INJECTION SUBCUTANEOUS at 13:43

## 2021-09-01 RX ADMIN — Medication 25 MILLIGRAM(S): at 05:20

## 2021-09-01 RX ADMIN — Medication 81 MILLIGRAM(S): at 13:43

## 2021-09-01 RX ADMIN — Medication 975 MILLIGRAM(S): at 05:20

## 2021-09-01 NOTE — DISCHARGE NOTE NURSING/CASE MANAGEMENT/SOCIAL WORK - PATIENT PORTAL LINK FT
You can access the FollowMyHealth Patient Portal offered by [facility] by registering at the following website: [email or web address]. By joining [facility]’s FollowMyHealth portal, you will also be able to view your health information using other applications (apps) compatible with our system.

## 2021-09-01 NOTE — PROGRESS NOTE ADULT - SUBJECTIVE AND OBJECTIVE BOX
HPI:  88F with PMHX HTN, AFIB, SHAHIDA, OA BIBEMS s/p mechanical fall when her hip gave out. Patient evaluated by Trauma Sx with scans done which were suspicious for transverse sinus thrombosis. CTV confirmed sinus thrombosis. RLE shortened and externally rotated. CT imaging +acute R hip fracture. Cleared by Trauma Sx. Ortho Sx plan for OR pending NeuroSx eval. Cleared by NeuroSx with plan for MRI and discussion of AC post-operatively. Pt seen/examined. Labs/imaging reviewed. Surgical reccs appreciated. Patient poor historian and really unable to provide HPI/ROS. Denies any pmhx or meds besides nasal spray which appears untrue. Does not know any meds/pharmacy info. Attempted to call emergency contact patients spouse overnight Arnold [phone] with no answer. Denies CP/SOB, F/C, N/V/D, abd pain, urinary complaints. ROS negative.  (27 Aug 2021 06:51)      INTERVAL HPI/OVERNIGHT EVENTS:  Patient seen and examined this morning with neurosurgical team.     Vital Signs Last 24 Hrs  T(C): 36.6 (01 Sep 2021 08:00), Max: 36.7 (01 Sep 2021 00:00)  T(F): 97.9 (01 Sep 2021 08:00), Max: 98.1 (01 Sep 2021 00:00)  HR: 80 (01 Sep 2021 08:30) (75 - 95)  BP: 119/71 (01 Sep 2021 08:30) (103/72 - 157/74)  BP(mean): 81 (01 Sep 2021 08:30) (80 - 102)  RR: 18 (01 Sep 2021 08:30) (18 - 20)  SpO2: 94% (01 Sep 2021 08:30) (94% - 98%)    PHYSICAL EXAM:  GENERAL: NAD, well-groomed, well-developed  HEAD:  Atraumatic, normocephalic  DRAINS:   WOUND: Dressing clean dry intact  COLTON COMA SCORE: E- V- M- =       E: 4= opens eyes spontaneously 3= to voice 2= to noxious 1= no opening       V: 5= oriented 4= confused 3= inappropriate words 2= incomprehensible sounds 1= nonverbal 1T= intubated       M: 6= follows commands 5= localizes 4= withdraws 3= flexor posturing 2= extensor posturing 1= no movement  MENTAL STATUS: AAO x3; Awake/Comatose; Opens eyes spontaneously/to voice/to light touch/to noxious stimuli; Appropriately conversant without aphasia/Nonverbal; following simple commands/mimicking/not following commands  CRANIAL NERVES: Visual acuity normal for age, visual fields full to confrontation, PERRL. EOMI without nystagmus. Facial sensation intact V1-3 distribution b/l. Face symmetric w/ normal eye closure and smile, tongue midline. Hearing grossly intact. Speech clear. Head turning and shoulder shrug intact.   REFLEXES: PERRL. Corneals intact b/l. Gag intact. Cough intact. Oculocephalic reflex intact (Doll's eye). Negative Veras's b/l. Negative clonus b/l  MOTOR: strength 5/5 b/l upper and lower extremities  Uppers     Delt (C5/6)     Bicep (C5/6)     Wrist Extend (C6)     Tricep (C7)     HG (C8/T1)  R                     5/5                 5/5                         5/5                           5/5                   5/5  L                      5/5                 5/5                         5/5                           5/5                   5/5  Lowers      HF(L1/L2)     KE (L3)     DF (L4)     EHL (L5)     PF (S1)      R                     5/5              5/5           5/5           5/5            5/5  L                     5/5               5/5          5/5            5/5            5/5  SENSATION: grossly intact to light touch all extremities  COORDINATION: Gait intact; rapid alternating movements intact; heel to shin intact; no upper extremity dysmetria  CHEST/LUNG: Clear to auscultation bilaterally; no rales, rhonchi, wheezing, or rubs  HEART: +S1/+S2; Regular rate and rhythm; no murmurs, rubs, or gallops  ABDOMEN: Soft, nontender, nondistended; bowel sounds present all four quadrants  EXTREMITIES:  2+ peripheral pulses, no clubbing, cyanosis, or edema  SKIN: Warm, dry; no rashes or lesions    LABS:                        10.9   9.14  )-----------( 220      ( 01 Sep 2021 07:11 )             33.1     09-01    137  |  100  |  14.1  ----------------------------<  97  4.2   |  28.0  |  0.43<L>    Ca    8.6      01 Sep 2021 07:11  Mg     2.0     09-01 08-31 @ 07:01  -  09-01 @ 07:00  --------------------------------------------------------  IN: 750 mL / OUT: 1670 mL / NET: -920 mL        RADIOLOGY & ADDITIONAL TESTS:          CAPRINI SCORE [CLOT]:  Patient has an estimated Caprini score of greater than 5.  However, the patient's unique clinical situation will be addressed in an individual manner to determine appropriate anticoagulation treatment, if any. HPI:  88F with PMHX HTN, AFIB, SHAHIDA, OA BIBEMS s/p mechanical fall when her hip gave out. Patient evaluated by Trauma Sx with scans done which were suspicious for transverse sinus thrombosis. CTV confirmed sinus thrombosis. RLE shortened and externally rotated. CT imaging +acute R hip fracture. Cleared by Trauma Sx. Ortho Sx plan for OR pending NeuroSx eval. Cleared by NeuroSx with plan for MRI and discussion of AC post-operatively. Pt seen/examined. Labs/imaging reviewed. Surgical reccs appreciated. Patient poor historian and really unable to provide HPI/ROS. Denies any pmhx or meds besides nasal spray which appears untrue. Does not know any meds/pharmacy info. Attempted to call emergency contact patients spouse overnight Arnold [phone] with no answer. Denies CP/SOB, F/C, N/V/D, abd pain, urinary complaints. ROS negative.  (27 Aug 2021 06:51)      INTERVAL HPI/OVERNIGHT EVENTS:  Patient seen and examined this morning with neurosurgical team.     Vital Signs Last 24 Hrs  T(C): 36.6 (01 Sep 2021 08:00), Max: 36.7 (01 Sep 2021 00:00)  T(F): 97.9 (01 Sep 2021 08:00), Max: 98.1 (01 Sep 2021 00:00)  HR: 80 (01 Sep 2021 08:30) (75 - 95)  BP: 119/71 (01 Sep 2021 08:30) (103/72 - 157/74)  BP(mean): 81 (01 Sep 2021 08:30) (80 - 102)  RR: 18 (01 Sep 2021 08:30) (18 - 20)  SpO2: 94% (01 Sep 2021 08:30) (94% - 98%)    PHYSICAL EXAM:  GENERAL: NAD, well-groomed, well-developed; pleasant, cooperative   HEAD:  Atraumatic, normocephalic  COLTON COMA SCORE: E-4 V-5 M-6 = 15  MENTAL STATUS: AAO x3; Awake; Opens eyes spontaneously. Appropriately conversant without aphasia following simple commands  CRANIAL NERVES: Visual acuity normal for age, PERRL. EOMI without nystagmus. Facial sensation intact V1-3 distribution b/l. Face symmetric w/ normal eye closure and smile, tongue midline. Hearing grossly intact. Speech clear.  MOTOR: strength 5/5 b/l upper extremities, no drift. RLE limited s/p ORIF, DF/PF 5/5. LLE 5/5.  SENSATION: grossly intact to light touch all extremities  CHEST/LUNG: Nonlabored breaths      LABS:                        10.9   9.14  )-----------( 220      ( 01 Sep 2021 07:11 )             33.1     09-01    137  |  100  |  14.1  ----------------------------<  97  4.2   |  28.0  |  0.43<L>    Ca    8.6      01 Sep 2021 07:11  Mg     2.0     09-01 08-31 @ 07:01  -  09-01 @ 07:00  --------------------------------------------------------  IN: 750 mL / OUT: 1670 mL / NET: -920 mL        RADIOLOGY & ADDITIONAL TESTS:  MR Venogram Head No Cont (08.31.21 @ 10:47)   IMPRESSION:  MRI BRAIN: moderate periventricular and bifrontal and LEFT parietal subcortical white matter ischemia. Acute thrombus was increased T1-weighted signal and lack offlow void on T2-weighted images is seen within the RIGHT transverse sinus.  MRV brain:  Nonocclusive thrombus is seen within the RIGHT transverse sinus.

## 2021-09-01 NOTE — PROGRESS NOTE ADULT - ATTENDING COMMENTS
Pt seen and examined this am. No acute events. Comfortable in bed    RLE  dressing c/d/i; FHL/EHL/GS/TA/PT intact; foot warm, well perfused, bcr    POD 3 R CMN, ESTEPHANIA  WBAT  pain control  PT/OT/OOB  DVT ppx
agree

## 2021-09-01 NOTE — PROGRESS NOTE ADULT - SUBJECTIVE AND OBJECTIVE BOX
Patient needs to have a BM.  RN aware and  attempting to find aide.  Pain is controlled.     REVIEW OF SYSTEMS  Constitutional - No fever,  No fatigue  Neurological - +loss of strength   Musculoskeletal - +joint pain    FUNCTIONAL PROGRESS  8/31 PT  Bed Mobility  Bed Mobility Training Sit-to-Supine: n/a, pt was returned sitting in chair with chair alarm on/in place  Bed Mobility Training Supine-to-Sit: moderate assist (50% patient effort);  1 person assist;  bed rails;  required increased assistance to get bilateral LE's out of bed/assume upright sitting at EOB position + verbal cues for proper hand placement.   Bed Mobility Training Limitations: decreased ability to use legs for bridging/pushing;  decreased ROM;  impaired balance;  pain;  decreased strength;  decreased endurance     Bed-Chair Transfer Training  Transfer Training Bed-to-Chair Transfer: n/a   Transfer Training Chair-to-Bed Transfer: n/a   Bed-to-Chair Transfer Training Transfer Safety Analysis: n/a     Sit-Stand Transfer Training  Transfer Training Sit-to-Stand Transfer: moderate assist (50% patient effort);  2 person assist;  required increased assistance  to help rise to a full standing position +verbal cues for proper hand placement. ;  rolling walker;  weight-bearing as tolerated   right LE   Transfer Training Stand-to-Sit Transfer: moderate assist (50% patient effort);  2 person assist;  required increased assistance  to help safely lower to a sitting position + verbal cues for proper hand placement. ;  weight-bearing as tolerated   right LE    rolling walker  Sit-to-Stand Transfer Training Transfer Safety Analysis: decreased balance;  decreased step length;  decreased weight-shifting ability;  decreased ROM;  impaired balance;  decreased strength;  pain;  decreased endurance     Gait Training  Gait Training: moderate assist (50% patient effort);  2 person assist;  required increased assistance + verbal cues to help maintain proper upright walking posture + for proper use of AD. pt required frequent verbal cues for proper gait sequence;  weight-bearing as tolerated   right LE    rolling walker;  6-7 steps from bed to chair including turns   Gait Analysis: 3-point gait   decreased arm swing;  decreased tre;  decreased hip/knee flexion;  increased time in double stance;  decreased step length;  decreased stride length;  decreased weight-shifting ability;  decreased toe clearance;  decreased ROM;  decreased strength;  impaired balance;  pain;  decreased endurance     VITALS  T(C): 36.6 (09-01-21 @ 08:00), Max: 36.8 (08-31-21 @ 12:00)  HR: 80 (09-01-21 @ 08:30) (75 - 95)  BP: 119/71 (09-01-21 @ 08:30) (103/72 - 157/74)  RR: 18 (09-01-21 @ 08:30) (18 - 20)  SpO2: 94% (09-01-21 @ 08:30) (94% - 98%)  Wt(kg): --    MEDICATIONS   acetaminophen   Tablet .. 975 milliGRAM(s) every 8 hours  aspirin  chewable 81 milliGRAM(s) daily  diazepam  Injectable 1 milliGRAM(s) once  enoxaparin Injectable 40 milliGRAM(s) daily  lactulose Syrup 15 Gram(s) daily  metoprolol succinate ER 25 milliGRAM(s) daily  morphine  - Injectable 1 milliGRAM(s) once PRN  morphine  - Injectable 0.5 milliGRAM(s) once PRN  oxyCODONE    IR 5 milliGRAM(s) every 4 hours PRN  polyethylene glycol 3350 17 Gram(s) daily      RECENT LABS/IMAGING                          10.9   9.14  )-----------( 220      ( 01 Sep 2021 07:11 )             33.1     09-01    137  |  100  |  14.1  ----------------------------<  97  4.2   |  28.0  |  0.43<L>    Ca    8.6      01 Sep 2021 07:11  Mg     2.0     09-01          MRI BRAIN 8/31 - moderate periventricular and bifrontal and LEFT parietal subcortical white matter ischemia. Acute thrombus was increased T1-weighted signal and lack of flow void on T2-weighted images is seen within the RIGHT transverse sinus.    MRV brain 8/31 - Nonocclusive thrombus is seen within the RIGHT transverse sinus.    ----------------------------------------------------------------------------------------  PHYSICAL EXAM  Constitutional - NAD, Comfortable  Extremities - Right hip incision - bandaged  Neurologic Exam -                    Cognitive - AAOx3     Motor - Right LE weakness - limited to pain  Psychiatric - Mood anxious  ----------------------------------------------------------------------------------------  ASSESSMENT/PLAN  88yFemale with functional deficits after right hip fracture.    Right IT fracture s/p IMN - WBAT   Sinus thrombosis - Lovenox  CAD - ASA  HTN - Lopressor  Pain - Tylenol, Oxycodone, Morphine  DVT PPX - Lovenox  Rehab - Agree with Dr. Urbano, recommend [facility], patient DOES NOT meet acute inpatient rehabilitation criteria. Patient needs a more prolonged stay to achieve transition to community living and would not be able to tolerate a comprehensive/intense rehab program of 3hours/day.     Will continue to follow and current recommendations may change if functional progress changes.    Recommend ongoing mobilization by staff to maintain cardiopulmonary function and prevention of secondary complications related to debility. Discussed with rehab team.

## 2021-09-01 NOTE — PROGRESS NOTE ADULT - ASSESSMENT
88F w/ incidental R transverse sinus thrombosis   s/p ORIF of R hip on 8/27/21          Plan  - Neuro checks  - Pain control PRN  - [id number]  - Lovenox 40 increased today  - STAT CTH for deterioration in mental status   - No acute neurosurgical intervention at this time  - Dispo planning  - Medical management/ supportive care per primary team  - D/w Dr. Singh  88F w/ incidental R transverse sinus thrombosis   s/p ORIF of R hip on 8/27/21            Plan  - Neuro checks  - Pain control PRN  - ASA81  - Lovenox 40 increased today  - STAT CTH for deterioration in mental status   - No acute neurosurgical intervention at this time  - Dispo planning  - Medical management/supportive care per primary team  - D/w Dr. Singh

## 2021-09-01 NOTE — PROGRESS NOTE ADULT - REASON FOR ADMISSION
Mechanical Fall, Acute Hip Fracture, Sinus Thrombosis

## 2021-09-01 NOTE — PROGRESS NOTE ADULT - PROVIDER SPECIALTY LIST ADULT
Anesthesia
Hospitalist
Hospitalist
Orthopedics
Neurosurgery
Orthopedics
Rehab Medicine
Hospitalist
Hospitalist
Neurosurgery
Orthopedics
Orthopedics

## 2021-09-07 ENCOUNTER — NON-APPOINTMENT (OUTPATIENT)
Age: 86
End: 2021-09-07

## 2021-09-08 ENCOUNTER — NON-APPOINTMENT (OUTPATIENT)
Age: 86
End: 2021-09-08

## 2021-10-05 ENCOUNTER — APPOINTMENT (OUTPATIENT)
Dept: INTERNAL MEDICINE | Facility: CLINIC | Age: 86
End: 2021-10-05
Payer: MEDICARE

## 2021-10-05 VITALS
WEIGHT: 106 LBS | HEART RATE: 81 BPM | OXYGEN SATURATION: 97 % | SYSTOLIC BLOOD PRESSURE: 115 MMHG | BODY MASS INDEX: 19.51 KG/M2 | DIASTOLIC BLOOD PRESSURE: 70 MMHG | RESPIRATION RATE: 16 BRPM | TEMPERATURE: 97.3 F | HEIGHT: 62 IN

## 2021-10-05 DIAGNOSIS — Z09 ENCOUNTER FOR FOLLOW-UP EXAMINATION AFTER COMPLETED TREATMENT FOR CONDITIONS OTHER THAN MALIGNANT NEOPLASM: ICD-10-CM

## 2021-10-05 DIAGNOSIS — G08 INTRACRANIAL AND INTRASPINAL PHLEBITIS AND THROMBOPHLEBITIS: ICD-10-CM

## 2021-10-05 DIAGNOSIS — S72.001A FRACTURE OF UNSPECIFIED PART OF NECK OF RIGHT FEMUR, INITIAL ENCOUNTER FOR CLOSED FRACTURE: ICD-10-CM

## 2021-10-05 PROCEDURE — 36415 COLL VENOUS BLD VENIPUNCTURE: CPT

## 2021-10-05 PROCEDURE — 99495 TRANSJ CARE MGMT MOD F2F 14D: CPT | Mod: 25

## 2021-10-05 RX ORDER — DILTIAZEM HYDROCHLORIDE 180 MG/1
180 CAPSULE, EXTENDED RELEASE ORAL
Qty: 180 | Refills: 1 | Status: DISCONTINUED | COMMUNITY
Start: 2017-05-12 | End: 2021-10-05

## 2021-10-06 LAB
ALBUMIN SERPL ELPH-MCNC: 3.8 G/DL
ALP BLD-CCNC: 205 U/L
ALT SERPL-CCNC: 9 U/L
ANION GAP SERPL CALC-SCNC: 13 MMOL/L
AST SERPL-CCNC: 18 U/L
BASOPHILS # BLD AUTO: 0.04 K/UL
BASOPHILS NFR BLD AUTO: 0.6 %
BILIRUB SERPL-MCNC: 0.3 MG/DL
BUN SERPL-MCNC: 17 MG/DL
CALCIUM SERPL-MCNC: 9.4 MG/DL
CHLORIDE SERPL-SCNC: 103 MMOL/L
CO2 SERPL-SCNC: 24 MMOL/L
CREAT SERPL-MCNC: 0.59 MG/DL
EOSINOPHIL # BLD AUTO: 0.11 K/UL
EOSINOPHIL NFR BLD AUTO: 1.6 %
FOLATE SERPL-MCNC: 9.2 NG/ML
GLUCOSE SERPL-MCNC: 94 MG/DL
HCT VFR BLD CALC: 39 %
HGB BLD-MCNC: 12.2 G/DL
IMM GRANULOCYTES NFR BLD AUTO: 0.3 %
LYMPHOCYTES # BLD AUTO: 1.01 K/UL
LYMPHOCYTES NFR BLD AUTO: 14.4 %
MAN DIFF?: NORMAL
MCHC RBC-ENTMCNC: 31.3 GM/DL
MCHC RBC-ENTMCNC: 33.3 PG
MCV RBC AUTO: 106.6 FL
MONOCYTES # BLD AUTO: 0.43 K/UL
MONOCYTES NFR BLD AUTO: 6.1 %
NEUTROPHILS # BLD AUTO: 5.41 K/UL
NEUTROPHILS NFR BLD AUTO: 77 %
PLATELET # BLD AUTO: 315 K/UL
POTASSIUM SERPL-SCNC: 4.4 MMOL/L
PROT SERPL-MCNC: 7.2 G/DL
RBC # BLD: 3.66 M/UL
RBC # FLD: 14.8 %
SODIUM SERPL-SCNC: 139 MMOL/L
T PALLIDUM AB SER QL IA: NEGATIVE
TSH SERPL-ACNC: 2.54 UIU/ML
VIT B12 SERPL-MCNC: 567 PG/ML
WBC # FLD AUTO: 7.02 K/UL

## 2021-10-06 NOTE — ADDENDUM
[FreeTextEntry1] : Labs good other than showing macrocytosis without anemia with normal B12 and folate.\par CMP normal other than increased alkaline phosphatase likely secondary to post hip fracture.\par Repeat next blood work.

## 2021-10-06 NOTE — PHYSICAL EXAM
[No Acute Distress] : no acute distress [Normal Sclera/Conjunctiva] : normal sclera/conjunctiva [No Respiratory Distress] : no respiratory distress  [No Accessory Muscle Use] : no accessory muscle use [Clear to Auscultation] : lungs were clear to auscultation bilaterally [Normal Rate] : normal rate  [Regular Rhythm] : with a regular rhythm [No Edema] : there was no peripheral edema [Soft] : abdomen soft [Non Tender] : non-tender [No Focal Deficits] : no focal deficits [Normal Affect] : the affect was normal [de-identified] : Frail [de-identified] : Confused with poor memory

## 2021-10-06 NOTE — ASSESSMENT
[FreeTextEntry1] : Patient presents status post hospitalization after fall with fracture of the right hip with surgical repair.She has recovered from this fairly well.\par Patient also found to have a transverse sinus thrombosisand was discharged on Lovenox which the patient is currently not on. Therefore told the patient's son to make appointment to see neurosurgery ASAP. Office phone number given.\par \par Patient also with increased memory issues which the family is well aware of.\par \par Menopause she doesn't today in the office to check metabolic status.\par Influenza vaccine declined.\par  states they've received the COVID vaccine.

## 2021-10-06 NOTE — HISTORY OF PRESENT ILLNESS
[FreeTextEntry2] : The patient presents after being admitted to [facility] where she presented to the ER on August 27, 2021 after a mechanical fall with evaluation finding right hip fracture and also right transverse sinus thrombosis.\par Orthopedic consultation gotten and patient had an IM nailing of the right hip fracture on August 31, 2021.\par Neurosurgical consultation was gotten for right transverse sinus thrombosis with MRI and venogram performed.\par MRV showed nonocclusive thrombosis seen in the right transverse sinus. \par No surgical intervention needed.\par Physical therapy gotten and patient stable for discharge on September 1, 2021 to subacute rehabilitation.\par History of atrial fibrillation therefore continue aspirin with no anticoagulation secondary to history of GI bleed.\par Secondary to the transverse sinus thrombosis patient treated with Lovenox 40 mg daily.\par \par The patient was discharged to subacute rehabilitation where she stayed until September 27 and now she is home living with her son and daughter-in-law.\par \par The patient complains of no hip pain and is walking with a cane.No further falls.\par \par The patient is confused about her issues and in discussion with her son she has had increased issues with her memory.. This occurred prior to her fall.\par \par Medications renewed and she is not on Lovenox and the son is unsure if she was getting it while at subacute rehabilitation.\par no appointment made yet for neurosurgical followup.

## 2022-01-14 ENCOUNTER — EMERGENCY (EMERGENCY)
Facility: HOSPITAL | Age: 87
LOS: 1 days | Discharge: HOME | End: 2022-01-14
Attending: EMERGENCY MEDICINE
Payer: MEDICARE

## 2022-01-14 VITALS
RESPIRATION RATE: 16 BRPM | OXYGEN SATURATION: 97 % | HEART RATE: 65 BPM | DIASTOLIC BLOOD PRESSURE: 57 MMHG | TEMPERATURE: 98 F | SYSTOLIC BLOOD PRESSURE: 104 MMHG | HEIGHT: 65 IN

## 2022-01-14 DIAGNOSIS — Z96.653 PRESENCE OF ARTIFICIAL KNEE JOINT, BILATERAL: Chronic | ICD-10-CM

## 2022-01-14 DIAGNOSIS — Z98.89 OTHER SPECIFIED POSTPROCEDURAL STATES: Chronic | ICD-10-CM

## 2022-01-14 LAB
ALBUMIN SERPL ELPH-MCNC: 3.1 G/DL — LOW (ref 3.3–5.2)
ALP SERPL-CCNC: 131 U/L — HIGH (ref 40–120)
ALT FLD-CCNC: 13 U/L — SIGNIFICANT CHANGE UP
ANION GAP SERPL CALC-SCNC: 14 MMOL/L — SIGNIFICANT CHANGE UP (ref 5–17)
APPEARANCE UR: ABNORMAL
AST SERPL-CCNC: 21 U/L — SIGNIFICANT CHANGE UP
BACTERIA # UR AUTO: ABNORMAL
BASOPHILS # BLD AUTO: 0.04 K/UL — SIGNIFICANT CHANGE UP (ref 0–0.2)
BASOPHILS # BLD AUTO: 0.04 K/UL — SIGNIFICANT CHANGE UP (ref 0–0.2)
BASOPHILS NFR BLD AUTO: 0.5 % — SIGNIFICANT CHANGE UP (ref 0–2)
BASOPHILS NFR BLD AUTO: 0.6 % — SIGNIFICANT CHANGE UP (ref 0–2)
BILIRUB SERPL-MCNC: 0.3 MG/DL — LOW (ref 0.4–2)
BILIRUB UR-MCNC: NEGATIVE — SIGNIFICANT CHANGE UP
BUN SERPL-MCNC: 13.2 MG/DL — SIGNIFICANT CHANGE UP (ref 8–20)
CALCIUM SERPL-MCNC: 8.4 MG/DL — LOW (ref 8.6–10.2)
CHLORIDE SERPL-SCNC: 104 MMOL/L — SIGNIFICANT CHANGE UP (ref 98–107)
CO2 SERPL-SCNC: 22 MMOL/L — SIGNIFICANT CHANGE UP (ref 22–29)
COLOR SPEC: YELLOW — SIGNIFICANT CHANGE UP
CREAT SERPL-MCNC: 0.71 MG/DL — SIGNIFICANT CHANGE UP (ref 0.5–1.3)
DIFF PNL FLD: ABNORMAL
EOSINOPHIL # BLD AUTO: 0.13 K/UL — SIGNIFICANT CHANGE UP (ref 0–0.5)
EOSINOPHIL # BLD AUTO: 0.17 K/UL — SIGNIFICANT CHANGE UP (ref 0–0.5)
EOSINOPHIL NFR BLD AUTO: 2.1 % — SIGNIFICANT CHANGE UP (ref 0–6)
EOSINOPHIL NFR BLD AUTO: 2.1 % — SIGNIFICANT CHANGE UP (ref 0–6)
EPI CELLS # UR: SIGNIFICANT CHANGE UP
FLUAV AG NPH QL: SIGNIFICANT CHANGE UP
FLUBV AG NPH QL: SIGNIFICANT CHANGE UP
GLUCOSE SERPL-MCNC: 126 MG/DL — HIGH (ref 70–99)
GLUCOSE UR QL: NEGATIVE MG/DL — SIGNIFICANT CHANGE UP
HCT VFR BLD CALC: 37 % — SIGNIFICANT CHANGE UP (ref 34.5–45)
HCT VFR BLD CALC: 39.1 % — SIGNIFICANT CHANGE UP (ref 34.5–45)
HGB BLD-MCNC: 11.8 G/DL — SIGNIFICANT CHANGE UP (ref 11.5–15.5)
HGB BLD-MCNC: 12.3 G/DL — SIGNIFICANT CHANGE UP (ref 11.5–15.5)
IMM GRANULOCYTES NFR BLD AUTO: 0.4 % — SIGNIFICANT CHANGE UP (ref 0–1.5)
IMM GRANULOCYTES NFR BLD AUTO: 1.1 % — SIGNIFICANT CHANGE UP (ref 0–1.5)
KETONES UR-MCNC: NEGATIVE — SIGNIFICANT CHANGE UP
LEUKOCYTE ESTERASE UR-ACNC: ABNORMAL
LYMPHOCYTES # BLD AUTO: 0.99 K/UL — LOW (ref 1–3.3)
LYMPHOCYTES # BLD AUTO: 1.02 K/UL — SIGNIFICANT CHANGE UP (ref 1–3.3)
LYMPHOCYTES # BLD AUTO: 12.8 % — LOW (ref 13–44)
LYMPHOCYTES # BLD AUTO: 15.6 % — SIGNIFICANT CHANGE UP (ref 13–44)
MAGNESIUM SERPL-MCNC: 2 MG/DL — SIGNIFICANT CHANGE UP (ref 1.6–2.6)
MCHC RBC-ENTMCNC: 30.5 PG — SIGNIFICANT CHANGE UP (ref 27–34)
MCHC RBC-ENTMCNC: 30.5 PG — SIGNIFICANT CHANGE UP (ref 27–34)
MCHC RBC-ENTMCNC: 31.5 GM/DL — LOW (ref 32–36)
MCHC RBC-ENTMCNC: 31.9 GM/DL — LOW (ref 32–36)
MCV RBC AUTO: 95.6 FL — SIGNIFICANT CHANGE UP (ref 80–100)
MCV RBC AUTO: 97 FL — SIGNIFICANT CHANGE UP (ref 80–100)
MONOCYTES # BLD AUTO: 0.46 K/UL — SIGNIFICANT CHANGE UP (ref 0–0.9)
MONOCYTES # BLD AUTO: 0.6 K/UL — SIGNIFICANT CHANGE UP (ref 0–0.9)
MONOCYTES NFR BLD AUTO: 7.3 % — SIGNIFICANT CHANGE UP (ref 2–14)
MONOCYTES NFR BLD AUTO: 7.5 % — SIGNIFICANT CHANGE UP (ref 2–14)
NEUTROPHILS # BLD AUTO: 4.65 K/UL — SIGNIFICANT CHANGE UP (ref 1.8–7.4)
NEUTROPHILS # BLD AUTO: 6.13 K/UL — SIGNIFICANT CHANGE UP (ref 1.8–7.4)
NEUTROPHILS NFR BLD AUTO: 73.3 % — SIGNIFICANT CHANGE UP (ref 43–77)
NEUTROPHILS NFR BLD AUTO: 76.7 % — SIGNIFICANT CHANGE UP (ref 43–77)
NITRITE UR-MCNC: NEGATIVE — SIGNIFICANT CHANGE UP
NT-PROBNP SERPL-SCNC: 772 PG/ML — HIGH (ref 0–300)
PH UR: 6 — SIGNIFICANT CHANGE UP (ref 5–8)
PLATELET # BLD AUTO: 284 K/UL — SIGNIFICANT CHANGE UP (ref 150–400)
PLATELET # BLD AUTO: 287 K/UL — SIGNIFICANT CHANGE UP (ref 150–400)
POTASSIUM SERPL-MCNC: 3.8 MMOL/L — SIGNIFICANT CHANGE UP (ref 3.5–5.3)
POTASSIUM SERPL-SCNC: 3.8 MMOL/L — SIGNIFICANT CHANGE UP (ref 3.5–5.3)
PROT SERPL-MCNC: 6.8 G/DL — SIGNIFICANT CHANGE UP (ref 6.6–8.7)
PROT UR-MCNC: 100 MG/DL
RBC # BLD: 3.87 M/UL — SIGNIFICANT CHANGE UP (ref 3.8–5.2)
RBC # BLD: 4.03 M/UL — SIGNIFICANT CHANGE UP (ref 3.8–5.2)
RBC # FLD: 13.6 % — SIGNIFICANT CHANGE UP (ref 10.3–14.5)
RBC # FLD: 14 % — SIGNIFICANT CHANGE UP (ref 10.3–14.5)
RBC CASTS # UR COMP ASSIST: ABNORMAL /HPF (ref 0–4)
RSV RNA NPH QL NAA+NON-PROBE: SIGNIFICANT CHANGE UP
SARS-COV-2 RNA SPEC QL NAA+PROBE: SIGNIFICANT CHANGE UP
SARS-COV-2 RNA SPEC QL NAA+PROBE: SIGNIFICANT CHANGE UP
SODIUM SERPL-SCNC: 139 MMOL/L — SIGNIFICANT CHANGE UP (ref 135–145)
SP GR SPEC: 1.01 — SIGNIFICANT CHANGE UP (ref 1.01–1.02)
TROPONIN T SERPL-MCNC: <0.01 NG/ML — SIGNIFICANT CHANGE UP (ref 0–0.06)
TSH SERPL-MCNC: 2.62 UIU/ML — SIGNIFICANT CHANGE UP (ref 0.27–4.2)
UROBILINOGEN FLD QL: NEGATIVE MG/DL — SIGNIFICANT CHANGE UP
WBC # BLD: 6.34 K/UL — SIGNIFICANT CHANGE UP (ref 3.8–10.5)
WBC # BLD: 7.99 K/UL — SIGNIFICANT CHANGE UP (ref 3.8–10.5)
WBC # FLD AUTO: 6.34 K/UL — SIGNIFICANT CHANGE UP (ref 3.8–10.5)
WBC # FLD AUTO: 7.99 K/UL — SIGNIFICANT CHANGE UP (ref 3.8–10.5)
WBC UR QL: >50

## 2022-01-14 PROCEDURE — 70450 CT HEAD/BRAIN W/O DYE: CPT | Mod: 26,MG

## 2022-01-14 PROCEDURE — 72125 CT NECK SPINE W/O DYE: CPT | Mod: 26,MG

## 2022-01-14 PROCEDURE — 93306 TTE W/DOPPLER COMPLETE: CPT | Mod: 26

## 2022-01-14 PROCEDURE — 99220: CPT

## 2022-01-14 PROCEDURE — 73522 X-RAY EXAM HIPS BI 3-4 VIEWS: CPT | Mod: 26

## 2022-01-14 PROCEDURE — G1004: CPT

## 2022-01-14 PROCEDURE — 93010 ELECTROCARDIOGRAM REPORT: CPT | Mod: 76

## 2022-01-14 PROCEDURE — 71045 X-RAY EXAM CHEST 1 VIEW: CPT | Mod: 26

## 2022-01-14 RX ORDER — METOPROLOL TARTRATE 50 MG
25 TABLET ORAL ONCE
Refills: 0 | Status: COMPLETED | OUTPATIENT
Start: 2022-01-14 | End: 2022-01-14

## 2022-01-14 RX ORDER — CEFTRIAXONE 500 MG/1
1000 INJECTION, POWDER, FOR SOLUTION INTRAMUSCULAR; INTRAVENOUS ONCE
Refills: 0 | Status: COMPLETED | OUTPATIENT
Start: 2022-01-14 | End: 2022-01-14

## 2022-01-14 RX ORDER — METOPROLOL TARTRATE 50 MG
25 TABLET ORAL DAILY
Refills: 0 | Status: DISCONTINUED | OUTPATIENT
Start: 2022-01-14 | End: 2022-01-18

## 2022-01-14 RX ADMIN — CEFTRIAXONE 1000 MILLIGRAM(S): 500 INJECTION, POWDER, FOR SOLUTION INTRAMUSCULAR; INTRAVENOUS at 12:25

## 2022-01-14 RX ADMIN — Medication 25 MILLIGRAM(S): at 10:16

## 2022-01-14 RX ADMIN — CEFTRIAXONE 100 MILLIGRAM(S): 500 INJECTION, POWDER, FOR SOLUTION INTRAMUSCULAR; INTRAVENOUS at 11:46

## 2022-01-14 NOTE — ED PROVIDER NOTE - CLINICAL SUMMARY MEDICAL DECISION MAKING FREE TEXT BOX
89yo w/pmh HTN presents for chest pain, in afib w/ RVR at rate of 200s per EMS, given 324 ASA and 12.5 mg IV diltiazem with successful rate control. Now in afib at 80-90s bpm on monitor and EKG. Labs, CXR pending.

## 2022-01-14 NOTE — CONSULT NOTE ADULT - ASSESSMENT
Patient is an 89 y/o woman with HTN, PAF not on systemic AC 2/2 fall risk and prior blood loss anemia, moderate aortic stenosis, who was last admitted 9/2021 post mechanical fall resulting in hip fracture s/p IMN and additionally incidentally found to have sinus thrombosis discharged to rehab on Lovenox who presents from home with sudden chest pressure found to have AF with RVR improved post IV diltiazem.    Symptomatic PAF with RVR/moderate aortic stenosis/chest pain  - chest pain on presentation was likely related t AF with rapid ventricular response in the background of AS   - serial troponin   - poor candidate for chronic systemic AC, currently on BID Lovenox   - continue metoprolol succinate 25mg ER  - may add digoxin 0.125mg daily at this time   - TTE to assess for interval progression of aortic stenosis prior to further escalation of benita blocking agents   - agree with infection work-up  - will need follow-up re: hx sinus thrombosis  - additional recommendations pending clinical course and results of diagnostic testing

## 2022-01-14 NOTE — ED PROVIDER NOTE - PROGRESS NOTE DETAILS
Resident Georgia Howe: spoke to cardiology, plan for observation, TTE, serial trops/ekgs. Resident Georgia Howe: Reassessed pt, no complaints at this time. Discussed lab and imaging results. Cardiology to see. Answered all questions.

## 2022-01-14 NOTE — ED PROVIDER NOTE - PHYSICAL EXAMINATION
General: well appearing, NAD  Head: NC/AT  Cardiac: irregular rhythm, regular rate, no m/r/g  Respiratory: CTABL, equal chest wall expansions, no conversational dyspnea  Abdomen: soft, ND, NT  Neuro: AAOx3,coordinated movement of all 4 extremities  Psych: calm, cooperative, normal affect  Skin: warm and dry

## 2022-01-14 NOTE — ED ADULT NURSE NOTE - NSIMPLEMENTINTERV_GEN_ALL_ED
Implemented All Universal Safety Interventions:  [place] to call system. Call bell, personal items and telephone within reach. Instruct patient to call for assistance. Room bathroom lighting operational. Non-slip footwear when patient is off stretcher. Physically safe environment: no spills, clutter or unnecessary equipment. Stretcher in lowest position, wheels locked, appropriate side rails in place. Implemented All Fall with Harm Risk Interventions:  [place] to call system. Call bell, personal items and telephone within reach. Instruct patient to call for assistance. Room bathroom lighting operational. Non-slip footwear when patient is off stretcher. Physically safe environment: no spills, clutter or unnecessary equipment. Stretcher in lowest position, wheels locked, appropriate side rails in place. Provide visual cue, wrist band, yellow gown, etc. Monitor gait and stability. Monitor for mental status changes and reorient to person, place, and time. Review medications for side effects contributing to fall risk. Reinforce activity limits and safety measures with patient and family. Provide visual clues: red socks.

## 2022-01-14 NOTE — ED ADULT NURSE NOTE - OBJECTIVE STATEMENT
Pt stated she had chest pain a few hours ago and has no pain now. The pain was only midsternal and lasted a short amount of time. She denied sob, skin is warm and dry

## 2022-01-14 NOTE — ED ADULT NURSE NOTE - CAS ELECT INFOMATION PROVIDED
DC instructions provided and reviewed by GABINO Escalona. Patient in understanding of all dc instructions. No further questions for the RN regarding DC. VSS./DC instructions

## 2022-01-14 NOTE — CONSULT NOTE ADULT - SUBJECTIVE AND OBJECTIVE BOX
[facility], THE HEART CENTER                [address] [address]                                     PHONE: [phone]                                       FAX: [phone]  [email or web address]      Reason for Consult: AF    HPI:  Patient is an 88 y/o woman with HTN, PAF not on systemic AC 2/2 fall risk and prior blood loss anemia, moderate aortic stenosis, who was last admitted 2021 post mechanical fall resulting in hip fracture s/p IMN and additionally incidentally found to have sinus thrombosis discharged to rehab on Lovenox who presents from home with sudden chest pressure found to have AF with RVR improved post IV diltiazem.   She is a limited historian and presently denies chest pain.      PAST MEDICAL & SURGICAL HISTORY:  Hypertension    Osteoarthritis    Afib  controlled with Diltiazem    H/O total knee replacement, bilateral    H/O bladder repair surgery      PREVIOUS DIAGNOSTIC TESTING:      EKG: Atrial fibrillation     ECHO FINDINGS:  < from: TTE Echo Complete w/ Contrast w/ Doppler (12.10.20 @ 10:08) >   1. Severely enlarged left atrium.   2. Left ventricular ejection fraction, by visual estimation,is 60 to 65%.   3. Mildly enlarged right atrium.   4. Normal right ventricular size and function.   5. Mild mitral valve regurgitation.   6. Moderate aortic valve stenosis. Valve is not well visualized. On 2D appears severe. No significant gradients obtained. Repeat study with pedoff probe to obtain gradients or recommend DOTTIE if clinically indicated.   7. Mild tricuspid regurgitation.   8. There is no evidence of pericardial effusion.    < end of copied text >      MEDICATIONS  (STANDING):  cefTRIAXone   IVPB 1000 milliGRAM(s) IV Intermittent once  Home Medications:  acetaminophen 325 mg oral tablet: 3 tab(s) orally every 8 hours (01 Sep 2021 11:31)  ascorbic acid 500 mg oral tablet: 1 tab(s) orally once a day (11 Dec 2020 11:05)  aspirin 81 mg oral delayed release tablet: 1 tab(s) orally once a day (11 Dec 2020 11:05)  enoxaparin: 40 milligram(s) injectable 2 times a day (01 Sep 2021 11:31)  metoprolol succinate 25 mg oral tablet, extended release: 1 tab(s) orally once a day (01 Sep 2021 11:31)  Multiple Vitamins oral tablet: 1 tab(s) orally once a day (11 Dec 2020 11:05)  oxyCODONE 5 mg oral tablet: 1 tab(s) orally every 4 hours, As needed, Severe Pain (7 - 10) (01 Sep 2021 11:31)  polyethylene glycol 3350 oral powder for reconstitution: 17 gram(s) orally once a day (01 Sep 2021 11:31)  senna oral tablet: 2 tab(s) orally once a day (at bedtime), As needed, Constipation (11 Dec 2020 11:05)  traZODone 50 mg oral tablet: 1 tab(s) orally once a day (at bedtime) (27 Aug 2021 14:29)      MEDICATIONS  (PRN):      FAMILY HISTORY: unable to provide   Family hx of colon cancer  sister    SOCIAL HISTORY:  CIGARETTES: denies   ALCOHOL: denies     ROS: Negative other than as mentioned in HPI.    Vital Signs Last 24 Hrs  T(C): 37 (2022 11:04), Max: 37 (2022 10:59)  T(F): 98.6 (2022 11:04), Max: 98.6 (2022 10:59)  HR: 80 (2022 11:04) (65 - 92)  BP: 114/65 (2022 11:04) (98/57 - 120/64)  BP(mean): --  RR: 18 (2022 11:04) (16 - 20)  SpO2: 99% (2022 11:04) (97% - 100%)    PHYSICAL EXAM:  General: thin frail elderly woman   HEENT: Head; normocephalic, atraumatic. sclera anicteric, MMM, no JVD, neck is supple   CARDIOVASCULAR; 3/6 ANNAMARIE, no rub, gallop or lift. irregularly irregular   LUNGS; No rales, rhonchi or wheeze. Normal breath sounds bilaterally.  ABDOMEN ; Soft, nontender without mass or organomegaly. bowel sounds normoactive.  EXTREMITIES; No clubbing, cyanosis or edema. Distal pulses wnl. ROM normal.  SKIN; warm and dry with normal turgor.  NEURO; Alert/oriented x 2/normal motor exam.     PSYCH; normal affect.        I&O's Detail      LABS:                        12.3   7.99  )-----------( 284      ( 2022 07:13 )             39.1     -    139  |  104  |  13.2  ----------------------------<  126<H>  3.8   |  22.0  |  0.71    Ca    8.4<L>      2022 07:13  Mg     2.0         TPro  6.8  /  Alb  3.1<L>  /  TBili  0.3<L>  /  DBili  x   /  AST  21  /  ALT  13  /  AlkPhos  131<H>      CARDIAC MARKERS ( 2022 07:13 )  x     / <0.01 ng/mL / x     / x     / x            Urinalysis Basic - ( 2022 10:26 )    Color: Yellow / Appearance: Slightly Turbid / S.010 / pH: x  Gluc: x / Ketone: Negative  / Bili: Negative / Urobili: Negative mg/dL   Blood: x / Protein: 100 mg/dL / Nitrite: Negative   Leuk Esterase: Moderate / RBC: 11-25 /HPF / WBC >50   Sq Epi: x / Non Sq Epi: Occasional / Bacteria: Moderate      I&O's Summary      RADIOLOGY & ADDITIONAL STUDIES:

## 2022-01-14 NOTE — ED ADULT NURSE NOTE - CHIEF COMPLAINT QUOTE
Pt. BIBA for sudden onset chest pain after walking to bathroom. As per EMS pt. was in A-fib 180s-200. Pt. given 12.5 of Cardizem IV by EMS. Pt. also took 324 of ASA. Pt. states chest pain is [id number] now. No hx of A-fib, no Blood thinners.

## 2022-01-14 NOTE — ED ADULT TRIAGE NOTE - CHIEF COMPLAINT QUOTE
Pt. BIBA for sudden onset chest pain after walking to bathroom. As per EMS pt. was in A-fib 180s-200. Pt. given 12.5 of Cardizem IV by EMS. Pt. also took 324 of ASA. Pt. states chest pain is sro6czs now. No hx of A-fib, no Blood thinners.

## 2022-01-14 NOTE — ED PROVIDER NOTE - ATTENDING CONTRIBUTION TO CARE
89yoF; with PMH signif for HTN, Afib(not on AC, on Metoprolol); now p/w chest pain--sscp, non-radiating, pressure, upon awakening this morning, and states she doesn't remember but her  states patient collapsed into bed, +loc. denies headache. denies n/v. denies numbness/tingling. denies focal weakness. denies sob. denies cough. denies extremity pain.  General:     NAD  Head:     NC/AT, EOMI, oral mucosa moist  Neck:     trachea midline  Lungs:     CTA b/l, no w/r/r  CVS:     S1S2, RRR, no m/g/r  Abd:     +BS, s/nt/nd, no organomegaly  Ext:    2+ radial and pedal pulses, no c/c/e  Neuro: AAOx2, no sensory/motor deficits  A/P:  89yoF p/w chest pain and syncope  -labs, ekg, cardiac monitor, pulse ox, CT head/cervical, cardiology consult

## 2022-01-14 NOTE — ED CDU PROVIDER INITIAL DAY NOTE - ATTENDING CONTRIBUTION TO CARE
pt with chest pain placed in observation  for serial troponins, ekg and echo; pe awake alert in nad heent ncat neck supple cor s1s2 lungs clear abd soft nontender neuro nonfocal dx chest pain ;  cards recommendation

## 2022-01-14 NOTE — ED PROVIDER NOTE - OBJECTIVE STATEMENT
88yo w/pmh HTN presents for chest pain. Reports she woke up with severe substernal CP 1h PTA, nonradiating, improving. Found to be in afib w/ RVR at rate of 200s per EMS, given 324 ASA and 12.5 mg IV diltiazem en route with rate control to 90s bpm and resolution of CP. Never had symptoms like this in the past. Pt denies any hx of afib, but per chart review, has seen [place] [facility] here in past, h/o PAF on AC, AC held after visit 1 month ago due to bleeding and fall risk. Denies recent infections, f/ch, cough, SOB, n/v, abdominal pain. Takes metoprolol succinate 25mg ER at home for HTN. 89yo w/pmh HTN presents for chest pain. Reports she woke up with severe substernal CP 1h PTA, nonradiating, improving. Found to be in afib w/ RVR at rate of 200s per EMS, given 324 ASA and 12.5 mg IV diltiazem en route with rate control to 90s bpm and resolution of CP. Never had symptoms like this in the past. Pt denies any hx of afib, but per chart review, has seen [place]ology here in past, h/o PAF on AC, AC held after visit 1 year ago 12/2020 due to bleeding and fall risk. Denies recent infections, f/ch, cough, SOB, n/v, abdominal pain. Takes metoprolol succinate 25mg ER at home for HTN.

## 2022-01-14 NOTE — ED CDU PROVIDER INITIAL DAY NOTE - OBJECTIVE STATEMENT
88yo w/pmh HTN presents for chest pain. Reports she woke up with severe substernal CP 1h PTA, nonradiating, improving. Found to be in afib w/ RVR at rate of 200s per EMS, given 324 ASA and 12.5 mg IV diltiazem en route with rate control to 90s bpm and resolution of CP. Never had symptoms like this in the past. Pt denies any hx of afib, but per chart review, has seen [place] here in past, h/o PAF on AC, AC held after visit 1 year ago 12/2020 due to bleeding and fall risk. Denies recent infections, f/ch, cough, SOB, n/v, abdominal pain. Takes metoprolol succinate 25mg ER at home for HTN.

## 2022-01-15 VITALS
TEMPERATURE: 98 F | DIASTOLIC BLOOD PRESSURE: 77 MMHG | HEART RATE: 76 BPM | OXYGEN SATURATION: 98 % | SYSTOLIC BLOOD PRESSURE: 126 MMHG | RESPIRATION RATE: 18 BRPM

## 2022-01-15 LAB
CULTURE RESULTS: SIGNIFICANT CHANGE UP
SPECIMEN SOURCE: SIGNIFICANT CHANGE UP

## 2022-01-15 PROCEDURE — 87086 URINE CULTURE/COLONY COUNT: CPT

## 2022-01-15 PROCEDURE — G1004: CPT

## 2022-01-15 PROCEDURE — G0378: CPT

## 2022-01-15 PROCEDURE — 80053 COMPREHEN METABOLIC PANEL: CPT

## 2022-01-15 PROCEDURE — U0003: CPT

## 2022-01-15 PROCEDURE — 99217: CPT | Mod: FS

## 2022-01-15 PROCEDURE — 73522 X-RAY EXAM HIPS BI 3-4 VIEWS: CPT

## 2022-01-15 PROCEDURE — 87637 SARSCOV2&INF A&B&RSV AMP PRB: CPT

## 2022-01-15 PROCEDURE — 81001 URINALYSIS AUTO W/SCOPE: CPT

## 2022-01-15 PROCEDURE — 84484 ASSAY OF TROPONIN QUANT: CPT

## 2022-01-15 PROCEDURE — 93306 TTE W/DOPPLER COMPLETE: CPT

## 2022-01-15 PROCEDURE — 84443 ASSAY THYROID STIM HORMONE: CPT

## 2022-01-15 PROCEDURE — U0005: CPT

## 2022-01-15 PROCEDURE — 36415 COLL VENOUS BLD VENIPUNCTURE: CPT

## 2022-01-15 PROCEDURE — 71045 X-RAY EXAM CHEST 1 VIEW: CPT

## 2022-01-15 PROCEDURE — 70450 CT HEAD/BRAIN W/O DYE: CPT | Mod: MG

## 2022-01-15 PROCEDURE — 85025 COMPLETE CBC W/AUTO DIFF WBC: CPT

## 2022-01-15 PROCEDURE — 83735 ASSAY OF MAGNESIUM: CPT

## 2022-01-15 PROCEDURE — 72125 CT NECK SPINE W/O DYE: CPT | Mod: MG

## 2022-01-15 PROCEDURE — 83880 ASSAY OF NATRIURETIC PEPTIDE: CPT

## 2022-01-15 PROCEDURE — 96365 THER/PROPH/DIAG IV INF INIT: CPT | Mod: XU

## 2022-01-15 PROCEDURE — 93005 ELECTROCARDIOGRAM TRACING: CPT

## 2022-01-15 PROCEDURE — 99285 EMERGENCY DEPT VISIT HI MDM: CPT | Mod: 25

## 2022-01-15 RX ORDER — CEFTRIAXONE 500 MG/1
1000 INJECTION, POWDER, FOR SOLUTION INTRAMUSCULAR; INTRAVENOUS EVERY 24 HOURS
Refills: 0 | Status: DISCONTINUED | OUTPATIENT
Start: 2022-01-15 | End: 2022-01-18

## 2022-01-15 RX ORDER — CEFPODOXIME PROXETIL 100 MG
1 TABLET ORAL
Qty: 20 | Refills: 0
Start: 2022-01-15 | End: 2022-01-24

## 2022-01-15 RX ORDER — DIGOXIN 250 MCG
125 TABLET ORAL DAILY
Refills: 0 | Status: DISCONTINUED | OUTPATIENT
Start: 2022-01-15 | End: 2022-01-15

## 2022-01-15 RX ORDER — METOPROLOL TARTRATE 50 MG
1.5 TABLET ORAL
Qty: 45 | Refills: 0
Start: 2022-01-15 | End: 2022-02-13

## 2022-01-15 RX ADMIN — Medication 25 MILLIGRAM(S): at 06:20

## 2022-01-15 NOTE — ED ADULT NURSE REASSESSMENT NOTE - URINE COLOR
In the next few weeks you may receive a Press Ganey survey regarding your most recent clinic visit with us.  Please take a few moments out of your day to accurately evaluate your visit.  We strive to provide you with the best medical care and hope you are happy with our services 100% of the time.  We consider any rating lower than a 9/10 unacceptable. If you believe you would rate our clinic less than this please let us know how we can improve.  Again, thank you for your time and we look forward to your next visit.           We want to give the best care possible. If you receive a Press Ganey survey from our office, please take the time to fill out the survey and return it in the envelope provided. Your feedback helps us know how we are doing and we really appreciate it. Thank you.    
yellow
yellow

## 2022-01-15 NOTE — ED CDU PROVIDER DISPOSITION NOTE - CLINICAL COURSE
pt c/o frequent falls - lives with family - walks with walker - found to have UTI and afib with RVR - rate controlled with medication - d/c with metoprolol 37.5 mg qd and antibx

## 2022-01-15 NOTE — PROGRESS NOTE ADULT - SUBJECTIVE AND OBJECTIVE BOX
[facility] - [facility], THE HEART CENTER                                   [address] [place] [address]                                                      PHONE: [phone]                                                         FAX: [phone]  [email or web address]  ---------------------------------------------------------------------------------------------------------------------------------    Overnight events/patient complaints:  NAD feeling well today     TELE 's     No Known Allergies    MEDICATIONS  (STANDING):  cefTRIAXone   IVPB 1000 milliGRAM(s) IV Intermittent every 24 hours  digoxin     Tablet 125 MICROGram(s) Oral daily  metoprolol succinate ER 25 milliGRAM(s) Oral daily    MEDICATIONS  (PRN):      Vital Signs Last 24 Hrs  T(C): 36.6 (15 Michael 2022 07:33), Max: 37 (2022 10:59)  T(F): 97.9 (15 Michael 2022 07:33), Max: 98.6 (2022 10:59)  HR: 80 (15 Michael 2022 07:33) (72 - 92)  BP: 144/62 (15 Michael 2022 07:33) (100/58 - 151/77)  BP(mean): --  RR: 18 (15 Michael 2022 07:33) (18 - 20)  SpO2: 97% (15 Michael 2022 07:33) (95% - 100%)  ICU Vital Signs Last 24 Hrs  JANEY NUNEZ  I&O's Detail    I&O's Summary    Drug Dosing Weight  JANEY NUNEZ      PHYSICAL EXAM:  General: Appears well developed, alert and cooperative.  HEENT: Head; normocephalic, atraumatic.  Eyes: Pupils reactive, cornea wnl.  Neck: Supple, no nodes adenopathy, no NVD or carotid bruit or thyromegaly.  CARDIOVASCULAR: Normal S1 and S2, 3/6 mid peaking murmur, rub, gallop or lift.   LUNGS: No rales, rhonchi or wheeze. Normal breath sounds bilaterally.  ABDOMEN: Soft, nontender without mass or organomegaly. bowel sounds normoactive.  EXTREMITIES: No clubbing, cyanosis or edema. Distal pulses wnl.   SKIN: warm and dry with normal turgor.  NEURO: Alert/oriented x 1/normal motor exam. No pathologic reflexes.    PSYCH: normal affect.        LABS:                        11.8   6.34  )-----------( 287      ( 2022 16:12 )             37.0         139  |  104  |  13.2  ----------------------------<  126<H>  3.8   |  22.0  |  0.71    Ca    8.4<L>      2022 07:13  Mg     2.0         TPro  6.8  /  Alb  3.1<L>  /  TBili  0.3<L>  /  DBili  x   /  AST  21  /  ALT  13  /  AlkPhos  131<H>      JANEY NUNEZ  CARDIAC MARKERS ( 2022 16:12 )  x     / <0.01 ng/mL / x     / x     / x      CARDIAC MARKERS ( 2022 11:49 )  x     / <0.01 ng/mL / x     / x     / x      CARDIAC MARKERS ( 2022 07:13 )  x     / <0.01 ng/mL / x     / x     / x            Urinalysis Basic - ( 2022 10:26 )    Color: Yellow / Appearance: Slightly Turbid / S.010 / pH: x  Gluc: x / Ketone: Negative  / Bili: Negative / Urobili: Negative mg/dL   Blood: x / Protein: 100 mg/dL / Nitrite: Negative   Leuk Esterase: Moderate / RBC: 11-25 /HPF / WBC >50   Sq Epi: x / Non Sq Epi: Occasional / Bacteria: Moderate        RADIOLOGY & ADDITIONAL STUDIES:    INTERPRETATION OF TELEMETRY (personally reviewed):      < from: TTE Echo Complete w/o Contrast w/ Doppler (22 @ 13:44) >    Summary:   1. Technically fair study.   2. Normal left ventricular internal cavity size.   3. Normal global left ventricular systolic function.   4. Left ventricular ejection fraction, by visual estimation, is 60 to   65%.   5. Severely enlarged left atrium.   6. Severely enlarged right atrium.   7. The mitral in-flow pattern revealsno discernable A-wave, therefore   no comment on diastolic function can be made.   8. Moderate mitral annular calcification.   9. Mild to moderate mitral valve regurgitation.  10. Moderate tricuspid regurgitation.  11. Severely calcified aortic valveon 2D imaging. DVI= 0.33, suggestive   of moderate aortic valve stenosis. LV stroke volume is low at 38 ml. By   continuity equation, AS is severe. Recommend other imaging modalities   such as DOTTIE or aortic valve calcium score in addition to physicalexam   for further evaluation.  12. Mild pulmonic valve regurgitation.  13. Estimated pulmonary artery systolic pressure is 41.2 mmHg assuming a   right atrial pressure of 3 mmHg, which is consistent with mild pulmonary   hypertension.  14. Color Doppler demonstrates the presence of a shunt at the Atrial   level.  15. Trivial pericardial effusion.    MD Luisa Electronically signed on 2022 at 5:26:38 PM    < end of copied text >    ASSESSMENT AND PLAN:  Patient is an 89 y/o woman with HTN, PAF not on systemic AC 2/2 fall risk and prior blood loss anemia, moderate aortic stenosis, who was last admitted 2021 post mechanical fall resulting in hip fracture s/p IMN and additionally incidentally found to have sinus thrombosis discharged to rehab on Lovenox who presents from home with sudden chest pressure found to have AF with RVR improved post IV diltiazem; negative for AMI and volume status stable       TTE reviewed see above normal EF moderate to severe AS        Poor candidate for chronic systemic AC from AF standpoint     Agree with Dig   Increase Toprol XL 25 mg to 37.5 daily     Out patient follow up     D/W with ED

## 2022-01-15 NOTE — ED CDU PROVIDER DISPOSITION NOTE - NSFOLLOWUPINSTRUCTIONS_ED_ALL_ED_FT
take metoprolol 37.5 mg once a day (1 and a half tablets)  take cefpodoxime (antibiotics) 1 pill 2x per day x 10 days for urinary tract infection  follow up with primary care doctor and cardiologist this week.

## 2022-01-15 NOTE — ED CDU PROVIDER SUBSEQUENT DAY NOTE - HISTORY
pt over night was son noel  . placed her on 1:1 due to risk of fall . called back the pt Je made him aware . mom not take any med . start her on metoprolol .  spoke with cardiology last night . recommended telemetry over night and will re eval in AM . no incident over night. as per son he has hx of cavernous venous thrombosis but she is refusing any out pt work up .

## 2022-01-15 NOTE — ED CDU PROVIDER DISPOSITION NOTE - PATIENT PORTAL LINK FT
You can access the FollowMyHealth Patient Portal offered by [facility] by registering at the following website: [email or web address]. By joining [facility]’s FollowMyHealth portal, you will also be able to view your health information using other applications (apps) compatible with our system.

## 2022-01-15 NOTE — ED ADULT NURSE REASSESSMENT NOTE - NS ED NURSE REASSESS COMMENT FT1
patient confuse trying to pull off the monitor trying to get off the bed  crying Nakia Rehman aware as per pa place patient under 1:1 observation foe patient sefty
patient sleeping at this time easy to awake keep under 1:1 observation
pt is sleeping at this time, hr 92bpm, she denied chest pain, abx are in progress with no s/s of adverse reaction. report given to brent WINN for obs
pt taken to echo at this time. awake and at baseline.
Assumed care of the patient at 1215. Verbal report received from Cristiane WINN ED, pt transferred to observation unit CDU 6R. Patient A&Ox3, forgetful. Patient remains asymptomatic. Denies CP/SOB or dizziness. NSR on CM. PIV patent. Patient pending rpt trop/ekg and TTE testing. Patient in understanding of plan of care. Patient with no further questions for the RN. Resting in comfort. Call bell within reach and encouraged to use when assistance needed. Will continue to monitor.
Assumed care of the patient at 0730. Verbal report received from Lisa WINN ED. Patient A&Ox3, forgetful. No s/s of acute distress. Denies CP/SOB or dizziness. 1:1 at bedside for safety. VSS. PIV patent. Sinus arrythmia on CM. Patient pending cardiology consult. Patient in understanding of plan of care. Patient with no further questions for the RN. Resting in comfort. Call bell within reach and encouraged to use when assistance needed. Will continue to monitor.

## 2022-01-15 NOTE — ED CDU PROVIDER SUBSEQUENT DAY NOTE - ATTENDING CONTRIBUTION TO CARE
I agree with the PA's note and was available for any issues/concerns. I was directly involved in patient care. My brief overall assessment is as follows:    no chest pain overnight; awaiting cardiology

## 2022-01-15 NOTE — ED ADULT NURSE REASSESSMENT NOTE - COMFORT CARE
remains NPO/plan of care explained/repositioned/wait time explained
assisted to bathroom/meal provided/plan of care explained/repositioned
plan of care explained/repositioned/wait time explained

## 2022-01-18 ENCOUNTER — NON-APPOINTMENT (OUTPATIENT)
Age: 87
End: 2022-01-18

## 2022-01-31 ENCOUNTER — APPOINTMENT (OUTPATIENT)
Dept: INTERNAL MEDICINE | Facility: CLINIC | Age: 87
End: 2022-01-31
Payer: MEDICARE

## 2022-01-31 VITALS
WEIGHT: 112 LBS | DIASTOLIC BLOOD PRESSURE: 80 MMHG | HEART RATE: 83 BPM | RESPIRATION RATE: 16 BRPM | BODY MASS INDEX: 20.61 KG/M2 | SYSTOLIC BLOOD PRESSURE: 125 MMHG | TEMPERATURE: 97.3 F | HEIGHT: 62 IN

## 2022-01-31 DIAGNOSIS — Z00.00 ENCOUNTER FOR GENERAL ADULT MEDICAL EXAMINATION W/OUT ABNORMAL FINDINGS: ICD-10-CM

## 2022-01-31 DIAGNOSIS — D64.9 ANEMIA, UNSPECIFIED: ICD-10-CM

## 2022-01-31 DIAGNOSIS — E53.8 DEFICIENCY OF OTHER SPECIFIED B GROUP VITAMINS: ICD-10-CM

## 2022-01-31 DIAGNOSIS — Z13.31 ENCOUNTER FOR SCREENING FOR DEPRESSION: ICD-10-CM

## 2022-01-31 DIAGNOSIS — I35.0 NONRHEUMATIC AORTIC (VALVE) STENOSIS: ICD-10-CM

## 2022-01-31 DIAGNOSIS — Z13.39 ENCOUNTER FOR SCREENING EXAM FOR OTHER MENTAL HEALTH AND BEHAVIORAL DISORDERS: ICD-10-CM

## 2022-01-31 DIAGNOSIS — Z87.440 PERSONAL HISTORY OF URINARY (TRACT) INFECTIONS: ICD-10-CM

## 2022-01-31 PROCEDURE — 36415 COLL VENOUS BLD VENIPUNCTURE: CPT

## 2022-01-31 PROCEDURE — G0442 ANNUAL ALCOHOL SCREEN 15 MIN: CPT | Mod: 59

## 2022-01-31 PROCEDURE — G0444 DEPRESSION SCREEN ANNUAL: CPT

## 2022-01-31 PROCEDURE — G0439: CPT

## 2022-01-31 RX ORDER — PANTOPRAZOLE 40 MG/1
40 TABLET, DELAYED RELEASE ORAL DAILY
Qty: 90 | Refills: 1 | Status: ACTIVE | COMMUNITY
Start: 2020-12-16 | End: 1900-01-01

## 2022-01-31 RX ORDER — ASPIRIN ENTERIC COATED TABLETS 81 MG 81 MG/1
81 TABLET, DELAYED RELEASE ORAL
Qty: 90 | Refills: 1 | Status: ACTIVE | COMMUNITY
Start: 2022-01-31

## 2022-01-31 RX ORDER — FOLIC ACID 1 MG/1
1 TABLET ORAL DAILY
Qty: 90 | Refills: 1 | Status: DISCONTINUED | COMMUNITY
Start: 2017-08-12 | End: 2022-01-31

## 2022-01-31 NOTE — HISTORY OF PRESENT ILLNESS
[FreeTextEntry1] : 89-year-old female with history of hypertension for which she takes Metoprolol, hyperlipidemia for which she has declined medications and osteoarthritis with DJD and DDD presents for her yearly physical.\par \par Also SVT which is controlled with Metoprolol.\par More recently the patient was in the ER January 14 2022 secondary to chest pain and found to be in rapid atrial fibrillation treated with IV Cardizem and increase metoprolol dose.It also noted she was going to be discharged on digoxin but according to the patient's son no prescription was given.\par \par Patient also with pulmonary hypertension without clinical signs.\par \par History also includes a history of pulmonary embolism in 2009 without etiology and hypercoagulable workup negative there has been no recurrence and resultant mild pulmonary hypertension.\par She then developed a DVT March 2019 and was put on Eliquis and the feeling was secondary to history of PE and now DVT that she should be on lifelong anticoagulation.\par Anticoagulation was discontinued December 2020 secondary to severe iron deficiency anemia felt likely secondary to GI bleeding.She had been on aspirin since.\par She has seen GI with recommended colonoscopy and endoscopy which the patient and family declined. She was to take iron daily but only multivitamin\par \par Also patient has a remote history of TB for which she was treated.\par \par Otherwise without complaints including no chest pain, palpitations, shortness of breath or edema.\par Her main complaints are decreased ambulatory ability and decrease in memory.CAT scan of the brain is negative for\par \par She fell July 2016 with resultant right femur fracture which needed operative repair. The patient still suffers with this with pain would decrease ability for leg movement and  limp.\par \par She also fell in 2018, sustaining a fracture of her left wrist with no surgical intervention, but following with orthopedic with splinting.She again fell October 21 sustaining a fracture of her right hip with surgical repair. Also had a transverse sinus thrombosis but no anticoagulation was given secondary to frequent falls and severe anemia.\par \par She and her  are now living with their son Je

## 2022-01-31 NOTE — HEALTH RISK ASSESSMENT
[Good] : ~his/her~ current health as good [Very Good] : ~his/her~  mood as very good [Never] : Never [Any fall with injury in past year] : Patient reported fall with injury in the past year [0] : 2) Feeling down, depressed, or hopeless: Not at all (0) [None] : None [With Significant Other] : lives with significant other [# of Members in Household ___] :  household currently consist of [unfilled] member(s) [High School] : high school [] :  [# Of Children ___] : has [unfilled] children [Sexually Active] : sexually active [Feels Safe at Home] : Feels safe at home [Fully functional (bathing, dressing, toileting, transferring, walking, feeding)] : Fully functional (bathing, dressing, toileting, transferring, walking, feeding) [Smoke Detector] : smoke detector [Carbon Monoxide Detector] : carbon monoxide detector [Seat Belt] :  uses seat belt [Sunscreen] : uses sunscreen [Discussed at today's visit] : Advance Directives Discussed at today's visit [Designated Healthcare Proxy] : Designated healthcare proxy [Yes] : Yes [2 - 4 times a month (2 pts)] : 2-4 times a month (2 points) [1 or 2 (0 pts)] : 1 or 2 (0 points) [Never (0 pts)] : Never (0 points) [Reviewed no changes] : Reviewed, no changes [Name: ___] : Health Care Proxy's Name: [unfilled]  [Audit-CScore] : 2 [de-identified] : Fell. 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

## 2022-01-31 NOTE — PHYSICAL EXAM
[General Appearance - Alert] : alert [General Appearance - In No Acute Distress] : in no acute distress [Sclera] : the sclera and conjunctiva were normal [PERRL With Normal Accommodation] : pupils were equal in size, round, and reactive to light [Extraocular Movements] : extraocular movements were intact [Outer Ear] : the ears and nose were normal in appearance [Oropharynx] : the oropharynx was normal [Neck Appearance] : the appearance of the neck was normal [Neck Cervical Mass (___cm)] : no neck mass was observed [Jugular Venous Distention Increased] : there was no jugular-venous distention [Thyroid Diffuse Enlargement] : the thyroid was not enlarged [Thyroid Nodule] : there were no palpable thyroid nodules [Auscultation Breath Sounds / Voice Sounds] : lungs were clear to auscultation bilaterally [Heart Sounds] : normal S1 and S2 [Heart Sounds Gallop] : no gallops [Murmurs] : no murmurs [Heart Sounds Pericardial Friction Rub] : no pericardial rub [Arterial Pulses Carotid] : carotid pulses were normal with no bruits [Abdominal Aorta] : the abdominal aorta was normal [Arterial Pulses Femoral] : femoral pulses were normal without bruits [Full Pulse] : the pedal pulses are present [Edema] : there was no peripheral edema [Breast Appearance] : normal in appearance [Breast Palpation Mass] : no palpable masses [Breast Abnormal Lactation (Galactorrhea)] : no nipple discharge [Bowel Sounds] : normal bowel sounds [Abdomen Soft] : soft [Abdomen Tenderness] : non-tender [Abdomen Mass (___ Cm)] : no abdominal mass palpated [Cervical Lymph Nodes Enlarged Posterior Bilaterally] : posterior cervical [Cervical Lymph Nodes Enlarged Anterior Bilaterally] : anterior cervical [Supraclavicular Lymph Nodes Enlarged Bilaterally] : supraclavicular [Axillary Lymph Nodes Enlarged Bilaterally] : axillary [Femoral Lymph Nodes Enlarged Bilaterally] : femoral [Inguinal Lymph Nodes Enlarged Bilaterally] : inguinal [No Spinal Tenderness] : no spinal tenderness [Abnormal Walk] : normal gait [Nail Clubbing] : no clubbing  or cyanosis of the fingernails [Musculoskeletal - Swelling] : no joint swelling seen [Motor Tone] : muscle strength and tone were normal [Skin Color & Pigmentation] : normal skin color and pigmentation [Skin Turgor] : normal skin turgor [] : no rash [Cranial Nerves] : cranial nerves 2-12 were intact [No Focal Deficits] : no focal deficits [Oriented To Time, Place, And Person] : oriented to person, place, and time [Impaired Insight] : insight and judgment were intact [Affect] : the affect was normal [FreeTextEntry1] : Many excoriations

## 2022-01-31 NOTE — ASSESSMENT
[FreeTextEntry1] : 89-year-old female with paroxysmal atrial fibrillation status post admission January 14, 2022 with rapid ventricular rate now on increased metoprolol withpatient still in atrial verbalization but with controlled rate. 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 The patient declines scopes despite family history of colon cancer\par \par Patient with no clinical signs of pulmonary hypertension\par \par Patient is to continue present medications.\par \par Colonoscopy in 2007 and patient declined followups despite family history of colon cancer.\par  Previously offered [place] guard stool test which she also declines.\par \par Mammography 2009 and declines followup\par Bone density-has never had and declines\par \par She has received COVID vaccine x 3\par Has declined and continues to decline other all vaccines.\par \par Cardiology followup recommended\par \par Followup in 6 months\par

## 2022-02-02 LAB
ALBUMIN SERPL ELPH-MCNC: 4.1 G/DL
ALP BLD-CCNC: 120 U/L
ALT SERPL-CCNC: 14 U/L
ANION GAP SERPL CALC-SCNC: 11 MMOL/L
APPEARANCE: CLEAR
AST SERPL-CCNC: 22 U/L
BACTERIA UR CULT: NORMAL
BACTERIA: NEGATIVE
BASOPHILS # BLD AUTO: 0.03 K/UL
BASOPHILS NFR BLD AUTO: 0.5 %
BILIRUB SERPL-MCNC: 0.2 MG/DL
BILIRUBIN URINE: NEGATIVE
BLOOD URINE: NEGATIVE
BUN SERPL-MCNC: 19 MG/DL
CALCIUM SERPL-MCNC: 9.9 MG/DL
CHLORIDE SERPL-SCNC: 105 MMOL/L
CHOLEST SERPL-MCNC: 225 MG/DL
CO2 SERPL-SCNC: 26 MMOL/L
COLOR: NORMAL
CREAT SERPL-MCNC: 0.71 MG/DL
EOSINOPHIL # BLD AUTO: 0.1 K/UL
EOSINOPHIL NFR BLD AUTO: 1.6 %
FERRITIN SERPL-MCNC: 19 NG/ML
FOLATE SERPL-MCNC: >20 NG/ML
GLUCOSE QUALITATIVE U: NEGATIVE
GLUCOSE SERPL-MCNC: 86 MG/DL
HCT VFR BLD CALC: 42.4 %
HDLC SERPL-MCNC: 100 MG/DL
HGB BLD-MCNC: 13.1 G/DL
HYALINE CASTS: 0 /LPF
IMM GRANULOCYTES NFR BLD AUTO: 0.3 %
IRON SATN MFR SERPL: 9 %
IRON SERPL-MCNC: 37 UG/DL
KETONES URINE: NEGATIVE
LDLC SERPL CALC-MCNC: 112 MG/DL
LEUKOCYTE ESTERASE URINE: NEGATIVE
LYMPHOCYTES # BLD AUTO: 1.26 K/UL
LYMPHOCYTES NFR BLD AUTO: 19.5 %
MAGNESIUM SERPL-MCNC: 2 MG/DL
MAN DIFF?: NORMAL
MCHC RBC-ENTMCNC: 30.5 PG
MCHC RBC-ENTMCNC: 30.9 GM/DL
MCV RBC AUTO: 98.6 FL
MICROSCOPIC-UA: NORMAL
MONOCYTES # BLD AUTO: 0.47 K/UL
MONOCYTES NFR BLD AUTO: 7.3 %
NEUTROPHILS # BLD AUTO: 4.57 K/UL
NEUTROPHILS NFR BLD AUTO: 70.8 %
NITRITE URINE: NEGATIVE
NONHDLC SERPL-MCNC: 125 MG/DL
PH URINE: 7
PLATELET # BLD AUTO: 343 K/UL
POTASSIUM SERPL-SCNC: 5.3 MMOL/L
PROT SERPL-MCNC: 8 G/DL
PROTEIN URINE: NEGATIVE
RBC # BLD: 4.3 M/UL
RBC # FLD: 15.1 %
RED BLOOD CELLS URINE: 2 /HPF
SODIUM SERPL-SCNC: 141 MMOL/L
SPECIFIC GRAVITY URINE: 1.02
SQUAMOUS EPITHELIAL CELLS: 1 /HPF
TIBC SERPL-MCNC: 412 UG/DL
TRIGL SERPL-MCNC: 65 MG/DL
UIBC SERPL-MCNC: 375 UG/DL
UROBILINOGEN URINE: NORMAL
VIT B12 SERPL-MCNC: 696 PG/ML
WBC # FLD AUTO: 6.45 K/UL
WHITE BLOOD CELLS URINE: 8 /HPF

## 2022-06-30 ENCOUNTER — APPOINTMENT (OUTPATIENT)
Dept: INTERNAL MEDICINE | Facility: CLINIC | Age: 87
End: 2022-06-30

## 2022-06-30 VITALS
HEART RATE: 89 BPM | WEIGHT: 120 LBS | TEMPERATURE: 97 F | BODY MASS INDEX: 22.08 KG/M2 | RESPIRATION RATE: 14 BRPM | OXYGEN SATURATION: 97 % | SYSTOLIC BLOOD PRESSURE: 130 MMHG | HEIGHT: 62 IN | DIASTOLIC BLOOD PRESSURE: 80 MMHG

## 2022-06-30 DIAGNOSIS — L03.119 CELLULITIS OF UNSPECIFIED PART OF LIMB: ICD-10-CM

## 2022-06-30 DIAGNOSIS — R60.0 LOCALIZED EDEMA: ICD-10-CM

## 2022-06-30 PROCEDURE — 99214 OFFICE O/P EST MOD 30 MIN: CPT | Mod: 25

## 2022-06-30 PROCEDURE — 36415 COLL VENOUS BLD VENIPUNCTURE: CPT

## 2022-06-30 RX ORDER — METOPROLOL SUCCINATE 25 MG/1
25 TABLET, EXTENDED RELEASE ORAL
Qty: 135 | Refills: 1 | Status: ACTIVE | COMMUNITY
Start: 2021-10-05 | End: 1900-01-01

## 2022-07-01 ENCOUNTER — NON-APPOINTMENT (OUTPATIENT)
Age: 87
End: 2022-07-01

## 2022-07-01 ENCOUNTER — APPOINTMENT (OUTPATIENT)
Dept: ULTRASOUND IMAGING | Facility: CLINIC | Age: 87
End: 2022-07-01

## 2022-07-01 ENCOUNTER — OUTPATIENT (OUTPATIENT)
Dept: OUTPATIENT SERVICES | Facility: HOSPITAL | Age: 87
LOS: 1 days | End: 2022-07-01
Payer: MEDICARE

## 2022-07-01 DIAGNOSIS — Z98.89 OTHER SPECIFIED POSTPROCEDURAL STATES: Chronic | ICD-10-CM

## 2022-07-01 DIAGNOSIS — R60.0 LOCALIZED EDEMA: ICD-10-CM

## 2022-07-01 DIAGNOSIS — Z96.653 PRESENCE OF ARTIFICIAL KNEE JOINT, BILATERAL: Chronic | ICD-10-CM

## 2022-07-01 LAB
ALBUMIN SERPL ELPH-MCNC: 4.1 G/DL
ALP BLD-CCNC: 115 U/L
ALT SERPL-CCNC: 9 U/L
ANION GAP SERPL CALC-SCNC: 15 MMOL/L
AST SERPL-CCNC: 20 U/L
BASOPHILS # BLD AUTO: 0.04 K/UL
BASOPHILS NFR BLD AUTO: 0.6 %
BILIRUB SERPL-MCNC: 0.4 MG/DL
BUN SERPL-MCNC: 18 MG/DL
CALCIUM SERPL-MCNC: 9.6 MG/DL
CHLORIDE SERPL-SCNC: 104 MMOL/L
CO2 SERPL-SCNC: 22 MMOL/L
CREAT SERPL-MCNC: 0.59 MG/DL
DEPRECATED D DIMER PPP IA-ACNC: 264 NG/ML DDU
EGFR: 86 ML/MIN/1.73M2
EOSINOPHIL # BLD AUTO: 0.14 K/UL
EOSINOPHIL NFR BLD AUTO: 2 %
GLUCOSE SERPL-MCNC: 87 MG/DL
HCT VFR BLD CALC: 40.3 %
HGB BLD-MCNC: 12.6 G/DL
IMM GRANULOCYTES NFR BLD AUTO: 0.3 %
LYMPHOCYTES # BLD AUTO: 1.02 K/UL
LYMPHOCYTES NFR BLD AUTO: 14.3 %
MAN DIFF?: NORMAL
MCHC RBC-ENTMCNC: 29.6 PG
MCHC RBC-ENTMCNC: 31.3 GM/DL
MCV RBC AUTO: 94.6 FL
MONOCYTES # BLD AUTO: 0.66 K/UL
MONOCYTES NFR BLD AUTO: 9.3 %
NEUTROPHILS # BLD AUTO: 5.23 K/UL
NEUTROPHILS NFR BLD AUTO: 73.5 %
NT-PROBNP SERPL-MCNC: 1961 PG/ML
PLATELET # BLD AUTO: 282 K/UL
POTASSIUM SERPL-SCNC: 3.7 MMOL/L
PROT SERPL-MCNC: 7.7 G/DL
RBC # BLD: 4.26 M/UL
RBC # FLD: 14.1 %
SODIUM SERPL-SCNC: 140 MMOL/L
WBC # FLD AUTO: 7.11 K/UL

## 2022-07-01 PROCEDURE — 93970 EXTREMITY STUDY: CPT

## 2022-07-01 PROCEDURE — 93970 EXTREMITY STUDY: CPT | Mod: 26

## 2022-07-01 NOTE — ADDENDUM
[FreeTextEntry1] : Labs show\par -D dimer of 264=stat doppler LE=NEG\par -BNP=1961=cardio eval today with [facility]

## 2022-07-01 NOTE — HISTORY OF PRESENT ILLNESS
[Family Member] : family member [FreeTextEntry8] : Patient presents with son. Patient is poor historian, her son reports she has not been taking her metoprolol for at least 1 week and woke up with swelling bilateral lower legs. Patient denies calf pain/dyspnea/shortness of breath/chest pain orthopnea.

## 2022-07-01 NOTE — ASSESSMENT
[FreeTextEntry1] : Venipuncture done in our office, Labs sent out. Lasix 20 mg one daily #3/Keflex 500 mg t.i.d. #21 given. Metoprolol renewed. Patient will likely need cardiology evaluation with echo pending blood work and consider venous Doppler bilateral. All discussed with patient and son, further recommendations pending blood work tomorrow. Patient will RTO as sched for reg check\par \par Dr. Ortega was present in office building while I examined patient\par

## 2022-07-01 NOTE — PHYSICAL EXAM
[No Acute Distress] : no acute distress [No Respiratory Distress] : no respiratory distress  [Clear to Auscultation] : lungs were clear to auscultation bilaterally [Normal Rate] : normal rate  [Regular Rhythm] : with a regular rhythm [Normal Affect] : the affect was normal [Normal Mood] : the mood was normal [de-identified] : 1-2+ pitting edema Left>right, + dull erythema mild warmth left lower leg, neg homans

## 2022-07-20 ENCOUNTER — APPOINTMENT (OUTPATIENT)
Dept: INTERNAL MEDICINE | Facility: CLINIC | Age: 87
End: 2022-07-20

## 2022-07-20 VITALS
HEIGHT: 62 IN | OXYGEN SATURATION: 98 % | RESPIRATION RATE: 13 BRPM | TEMPERATURE: 97 F | BODY MASS INDEX: 22.08 KG/M2 | SYSTOLIC BLOOD PRESSURE: 125 MMHG | WEIGHT: 120 LBS | DIASTOLIC BLOOD PRESSURE: 85 MMHG | HEART RATE: 71 BPM

## 2022-07-20 DIAGNOSIS — I10 ESSENTIAL (PRIMARY) HYPERTENSION: ICD-10-CM

## 2022-07-20 DIAGNOSIS — R41.3 OTHER AMNESIA: ICD-10-CM

## 2022-07-20 DIAGNOSIS — E78.5 HYPERLIPIDEMIA, UNSPECIFIED: ICD-10-CM

## 2022-07-20 DIAGNOSIS — I48.0 PAROXYSMAL ATRIAL FIBRILLATION: ICD-10-CM

## 2022-07-20 DIAGNOSIS — Z79.899 OTHER LONG TERM (CURRENT) DRUG THERAPY: ICD-10-CM

## 2022-07-20 PROCEDURE — 99214 OFFICE O/P EST MOD 30 MIN: CPT | Mod: 25

## 2022-07-20 PROCEDURE — 36415 COLL VENOUS BLD VENIPUNCTURE: CPT

## 2022-07-20 RX ORDER — FERROUS SULFATE TAB EC 325 MG (65 MG FE EQUIVALENT) 325 (65 FE) MG
325 (65 FE) TABLET DELAYED RESPONSE ORAL
Refills: 0 | Status: DISCONTINUED | COMMUNITY
Start: 2020-12-16 | End: 2022-07-20

## 2022-07-20 RX ORDER — POLYETHYLENE GLYCOL 3350 17 G/17G
17 POWDER, FOR SOLUTION ORAL DAILY
Qty: 1 | Refills: 1 | Status: DISCONTINUED | COMMUNITY
Start: 2021-01-15 | End: 2022-07-20

## 2022-07-20 RX ORDER — CEPHALEXIN 500 MG/1
500 CAPSULE ORAL
Qty: 21 | Refills: 0 | Status: DISCONTINUED | COMMUNITY
Start: 2022-06-30 | End: 2022-07-20

## 2022-07-20 RX ORDER — FUROSEMIDE 20 MG/1
20 TABLET ORAL
Qty: 3 | Refills: 0 | Status: DISCONTINUED | COMMUNITY
Start: 2022-06-30 | End: 2022-07-20

## 2022-07-20 RX ORDER — TRAZODONE HYDROCHLORIDE 50 MG/1
50 TABLET ORAL
Qty: 30 | Refills: 1 | Status: DISCONTINUED | COMMUNITY
Start: 2020-12-08 | End: 2022-07-20

## 2022-07-20 NOTE — PHYSICAL EXAM
[General Appearance - In No Acute Distress] : in no acute distress [] : no respiratory distress [Respiration, Rhythm And Depth] : normal respiratory rhythm and effort [Auscultation Breath Sounds / Voice Sounds] : lungs were clear to auscultation bilaterally [Heart Rate And Rhythm] : heart rate was normal and rhythm regular [Affect] : the affect was normal [Mood] : the mood was normal [de-identified] : MME=15/30, obvious issue

## 2022-07-20 NOTE — ASSESSMENT
[FreeTextEntry1] : Patient had brain imaging 9/2021, B12/thyroid and syphilis levels to be assessed, discussed mind activity. Discussed neurology evaluation and medication which patient currently declines.Venipuncture done in our office, Labs sent out/ further recommendations will be made based on lab results. Patient advised to continue present medications with diet/exercise and specialist followup. Patient will return to the office for CP in jan\par \par Dr. Ortega was present in office building while I examined patient\par \par \par \par \par declines all vaccines but got covid vaccines X2=declines booster\par Declines mammogram/bone density/colonoscopy or cologuard/GYN exam\par specialists include\par 1. Dermatology prn-Dr. Pemberton\par 2.cardio-Dr. Lujan\par 3. Ophthalmology Q year-cannot recall doctor's name at this time\par 4.GI-Dr. Bonilla\par 5.Hematology-Dr. Alicia-no follow up needed\par 6.Vascular for VV-rev via hematology=declines\par echo via cardio\par

## 2022-07-20 NOTE — HISTORY OF PRESENT ILLNESS
[FreeTextEntry1] : Patient presents for followup\par hyperlipidemia, trying to control dietarily\par -Hypertension on Toprol\par -PAF on aspirin\par -got covid vaccines X2\par - See last note, edema resolved with Lasix\par -Complaining of memory difficulties, see CPE note from 1/2022 as it was discussed but no recs made

## 2022-07-21 LAB
ALBUMIN SERPL ELPH-MCNC: 4 G/DL
ALP BLD-CCNC: 108 U/L
ALT SERPL-CCNC: 10 U/L
ANION GAP SERPL CALC-SCNC: 12 MMOL/L
AST SERPL-CCNC: 20 U/L
BASOPHILS # BLD AUTO: 0.04 K/UL
BASOPHILS NFR BLD AUTO: 0.6 %
BILIRUB SERPL-MCNC: 0.4 MG/DL
BUN SERPL-MCNC: 15 MG/DL
CALCIUM SERPL-MCNC: 9.5 MG/DL
CHLORIDE SERPL-SCNC: 105 MMOL/L
CHOLEST SERPL-MCNC: 194 MG/DL
CO2 SERPL-SCNC: 23 MMOL/L
CREAT SERPL-MCNC: 0.61 MG/DL
EGFR: 85 ML/MIN/1.73M2
EOSINOPHIL # BLD AUTO: 0.16 K/UL
EOSINOPHIL NFR BLD AUTO: 2.5 %
GLUCOSE SERPL-MCNC: 109 MG/DL
HCT VFR BLD CALC: 40.5 %
HDLC SERPL-MCNC: 85 MG/DL
HGB BLD-MCNC: 12.4 G/DL
IMM GRANULOCYTES NFR BLD AUTO: 0.2 %
LDLC SERPL CALC-MCNC: 92 MG/DL
LYMPHOCYTES # BLD AUTO: 1.11 K/UL
LYMPHOCYTES NFR BLD AUTO: 17.5 %
MAGNESIUM SERPL-MCNC: 2 MG/DL
MAN DIFF?: NORMAL
MCHC RBC-ENTMCNC: 29.8 PG
MCHC RBC-ENTMCNC: 30.6 GM/DL
MCV RBC AUTO: 97.4 FL
MONOCYTES # BLD AUTO: 0.44 K/UL
MONOCYTES NFR BLD AUTO: 6.9 %
NEUTROPHILS # BLD AUTO: 4.6 K/UL
NEUTROPHILS NFR BLD AUTO: 72.3 %
NONHDLC SERPL-MCNC: 109 MG/DL
PLATELET # BLD AUTO: 295 K/UL
POTASSIUM SERPL-SCNC: 4.2 MMOL/L
PROT SERPL-MCNC: 7.4 G/DL
RBC # BLD: 4.16 M/UL
RBC # FLD: 14.2 %
SODIUM SERPL-SCNC: 140 MMOL/L
TRIGL SERPL-MCNC: 84 MG/DL
TSH SERPL-ACNC: 2.5 UIU/ML
VIT B12 SERPL-MCNC: 526 PG/ML
WBC # FLD AUTO: 6.36 K/UL

## 2022-07-22 LAB — RPR SER-TITR: NORMAL

## 2022-07-26 ENCOUNTER — NON-APPOINTMENT (OUTPATIENT)
Age: 87
End: 2022-07-26

## 2022-10-13 PROBLEM — Z13.31 SCREENING FOR DEPRESSION: Status: ACTIVE | Noted: 2022-01-31

## 2023-02-28 NOTE — ED ADULT NURSE REASSESSMENT NOTE - DISTAL EXTREMITY CAPILLARY REFILL
22g Peripheral IV started to right forearm. IV site flushes well with good blood return.   
2 seconds or less
2 seconds or less

## 2023-06-21 NOTE — ED PROVIDER NOTE - EKG ADDITIONAL INFORMATION FREE TEXT
afib, 91bpm Arava Counseling:  Patient counseled regarding adverse effects of Arava including but not limited to nausea, vomiting, abnormalities in liver function tests. Patients may develop mouth sores, rash, diarrhea, and abnormalities in blood counts. The patient understands that monitoring is required including LFTs and blood counts.  There is a rare possibility of scarring of the liver and lung problems that can occur when taking methotrexate. Persistent nausea, loss of appetite, pale stools, dark urine, cough, and shortness of breath should be reported immediately. Patient advised to discontinue Arava treatment and consult with a physician prior to attempting conception. The patient will have to undergo a treatment to eliminate Arava from the body prior to conception.

## 2023-10-03 NOTE — ED ADULT NURSE NOTE - NSFALLRSKINDICTYPE_ED_ALL_ED
CONTINUE these medications which have NOT CHANGED    Details   furosemide (LASIX) 40 MG tablet Take 1 tablet by mouth daily  Qty: 90 tablet, Refills: 0    Associated Diagnoses: Edema, unspecified type      aspirin 81 MG chewable tablet Take 1 tablet by mouth daily  Qty: 90 tablet, Refills: 0    Associated Diagnoses: History of TIA (transient ischemic attack)      atorvastatin (LIPITOR) 80 MG tablet Take 1 tablet by mouth daily  Qty: 90 tablet, Refills: 0    Associated Diagnoses: Hyperlipidemia LDL goal <70      bumetanide (BUMEX) 1 MG tablet Take 1 tablet by mouth 2 times daily  Qty: 180 tablet, Refills: 0    Associated Diagnoses: History of TIA (transient ischemic attack)      ergocalciferol (ERGOCALCIFEROL) 1.25 MG (18599 UT) capsule Take 1 capsule by mouth once a week  Qty: 12 capsule, Refills: 0    Associated Diagnoses: Vitamin D deficiency      insulin lispro, 1 Unit Dial, (HUMALOG/ADMELOG) 100 UNIT/ML SOPN Inject 8 Units into the skin 3 times daily (before meals)  Qty: 5 Adjustable Dose Pre-filled Pen Syringe, Refills: 1    Associated Diagnoses: Type 2 diabetes mellitus with hypoglycemia without coma, with long-term current use of insulin (HCC)      levETIRAcetam (KEPPRA) 100 MG/ML solution Take 10 ml q 12 hours  Qty: 180 mL, Refills: 0    Associated Diagnoses: Seizures (HCC)      lidocaine 4 % external patch Apply 1 patch to left side of neck topically once daily for pain management and remove per schedule.   Qty: 90 patch, Refills: 0    Associated Diagnoses: Chronic left shoulder pain      lisinopril (PRINIVIL;ZESTRIL) 20 MG tablet Take 1 tablet by mouth daily  Qty: 90 tablet, Refills: 0    Associated Diagnoses: Essential hypertension with goal blood pressure less than 130/85      metFORMIN (GLUCOPHAGE-XR) 500 MG extended release tablet Take 1 tablet by mouth daily  Qty: 90 tablet, Refills: 0    Associated Diagnoses: Type 2 diabetes mellitus with hypoglycemia without coma, with long-term current use of
Impaired Gait

## 2023-12-17 NOTE — HISTORY OF PRESENT ILLNESS
[de-identified] : constipation, FE deficiency anemia \par The patient is here for evaluation of anemia and iron deficiency anemia. ? family hx of colon cancer\par   Patient states she has had constipation for over 50 years. He has a bowel movement every 3-4 days which is hard. She gets abdominal cramping and had a bowel movement and they have. No rectal bleeding no melena. Patient has had dark stool on iron. No heartburn or acid regurgitation and weight loss. 2019 patient's hemoglobin was 11 .3. In December 2028 her hemoglobin was 7.7 with iron deficiency anemia on iron studies. She has been taking iron and now her hemoglobin on January 7 10.7. Her last colonoscopy in 2007 showed one colon polyp. Patient denies heartburn or acid regurgitation.\par     O n her health hx - PE/DVT listed. She is on no anticoagulants. Also listed is SVT, AS , pulmonary HTN.  Patient follows jacqu[facility] sch[facility] .   present was during the visit
No

## 2024-03-21 ENCOUNTER — NON-APPOINTMENT (OUTPATIENT)
Age: 89
End: 2024-03-21

## 2024-03-21 DIAGNOSIS — Z87.898 PERSONAL HISTORY OF OTHER SPECIFIED CONDITIONS: ICD-10-CM

## 2024-03-21 DIAGNOSIS — F03.C0 UNSPECIFIED DEMENTIA, SEVERE, WITHOUT BEHAVIORAL DISTURBANCE, PSYCHOTIC DISTURBANCE, MOOD DISTURBANCE, AND ANXIETY: ICD-10-CM

## 2024-05-04 NOTE — ED ADULT NURSE NOTE - CAS TRG GENERAL AIRWAY, MLM
Patient received skilled nursing care today for wound care and education on meds and disease mangment   Caregiver involvement: pt wife provides assistance  Medications reconciled. Patient reports no medication changes since last visit.   Home health supplies delivered this visit include: NA   Patient education provided this visit: s/s of infection and to notify if expeirences   Medication management, taking medications as prescribed.no issues  SN instructed pt on possible side effects of medication:educated on side effects of ibuprofen such as nausea or vomiting   Infection control and prevention measures, S/S of infection SN instructed pt on S/S infection reportable to MD such as increased redness, swelling, drainage, odor, pain. Patient verbalized understanding.   Patient/caregiver response: Verbalizes understanding via the teach back method.   Plan for next home care visit:  for wound care   The following discharge planning was discussed with the patient/caregiver: home care agency discharge to be completed when goals met. Patient in agreement.   This patient remains homebound. See Homebound Status within Visit Record. Patent

## 2024-05-15 NOTE — INPATIENT CERTIFICATION FOR MEDICARE PATIENTS - PHYSICIAN CONCUR
denies pain/discomfort (Rating = 0)
I concur with the Admission Order and I certify that services are provided in accordance with Section 42 CFR § 412.3